# Patient Record
Sex: MALE | Race: WHITE | Employment: FULL TIME | URBAN - METROPOLITAN AREA
[De-identification: names, ages, dates, MRNs, and addresses within clinical notes are randomized per-mention and may not be internally consistent; named-entity substitution may affect disease eponyms.]

---

## 2017-01-26 ENCOUNTER — TELEPHONE (OUTPATIENT)
Dept: FAMILY MEDICINE CLINIC | Age: 47
End: 2017-01-26

## 2017-01-26 NOTE — TELEPHONE ENCOUNTER
Called back and spoke with pt. Pt stated that he had fluid pulled off his knee and it was positive for lyme disease. Came here to get his blood work done to found out if really lyme disease and the code that was used was denied by [facility] and needs it to be changed . Pt received a bill for $500. Would like dx code changed from stating experimental draw so test can be resubmitted and covered. Would like a follow up call when done. Told pt would notify  of need for code change .

## 2017-01-26 NOTE — TELEPHONE ENCOUNTER
----- Message from [place] sent at 1/26/2017 10:48 AM EST -----  Regarding: Dr. Tato Rogers telephone  Contact: [phone]  Pt is requesting a call back regarding diagnosis code for lab work at [facility] for blood test. Pt's best contact number is [phone].

## 2017-02-08 RX ORDER — ALPRAZOLAM 1 MG/1
TABLET ORAL
Qty: 30 TAB | Refills: 2 | Status: SHIPPED | OUTPATIENT
Start: 2017-02-08 | End: 2017-02-09 | Stop reason: SDUPTHER

## 2017-02-09 ENCOUNTER — TELEPHONE (OUTPATIENT)
Dept: FAMILY MEDICINE CLINIC | Age: 47
End: 2017-02-09

## 2017-02-10 ENCOUNTER — TELEPHONE (OUTPATIENT)
Dept: FAMILY MEDICINE CLINIC | Age: 47
End: 2017-02-10

## 2017-02-10 RX ORDER — ALPRAZOLAM 1 MG/1
TABLET ORAL
Qty: 30 TAB | Refills: 2 | Status: SHIPPED | OUTPATIENT
Start: 2017-02-10 | End: 2017-07-10 | Stop reason: SDUPTHER

## 2017-04-14 RX ORDER — LOSARTAN POTASSIUM 100 MG/1
TABLET ORAL
Qty: 90 TAB | Refills: 12 | Status: SHIPPED | OUTPATIENT
Start: 2017-04-14 | End: 2018-07-10 | Stop reason: SDUPTHER

## 2017-04-20 ENCOUNTER — TELEPHONE (OUTPATIENT)
Dept: FAMILY MEDICINE CLINIC | Age: 47
End: 2017-04-20

## 2017-04-20 NOTE — TELEPHONE ENCOUNTER
----- Message from Professor Corine Saucedo sent at 4/20/2017  3:55 PM EDT -----  Regarding: Dr. Keri Hebert  Pt stated that if there is any more information needed for the appeal form that he dropped off to the Dr on 04/19/17, to please call him at [phone].

## 2017-04-21 ENCOUNTER — TELEPHONE (OUTPATIENT)
Dept: FAMILY MEDICINE CLINIC | Age: 47
End: 2017-04-21

## 2017-04-21 NOTE — TELEPHONE ENCOUNTER
Called pt back. No answer.  We received forms and will see about trying to find new dx codes for bill to be approved

## 2017-04-21 NOTE — TELEPHONE ENCOUNTER
Called pt back. Told pt we have forms and if we need anything else we will contact him.  Pt verbalized understanding

## 2017-04-21 NOTE — TELEPHONE ENCOUNTER
----- Message from Tory Party sent at 4/21/2017  9:30 AM EDT -----  Regarding: Dr. Anthony Terry states he brought an appeal from [employer] for Dr. Joanna Mcdermott, he missed a call and thought it might be regarding that. Best contact number is [phone].

## 2017-05-02 ENCOUNTER — DOCUMENTATION ONLY (OUTPATIENT)
Dept: FAMILY MEDICINE CLINIC | Age: 47
End: 2017-05-02

## 2017-05-02 NOTE — PROGRESS NOTES
Informed pt that writer spoke with [employer] to make sure we have all that is needed for the appeal of bill. Representative stated that along with the one authorization form that we have , we also need a appeal form to fill out. Pt notified to obtain form brom insurance and bring to us when he recieves it .  Pt verbalized understanding and will bring form once he gets it

## 2017-07-10 RX ORDER — ALPRAZOLAM 1 MG/1
TABLET ORAL
Qty: 30 TAB | Refills: 2 | Status: SHIPPED | OUTPATIENT
Start: 2017-07-10 | End: 2017-10-24 | Stop reason: SDUPTHER

## 2017-07-11 ENCOUNTER — TELEPHONE (OUTPATIENT)
Dept: FAMILY MEDICINE CLINIC | Age: 47
End: 2017-07-11

## 2017-07-29 RX ORDER — INDOMETHACIN 50 MG/1
CAPSULE ORAL
Qty: 30 CAP | Refills: 0 | Status: SHIPPED | OUTPATIENT
Start: 2017-07-29 | End: 2018-05-28 | Stop reason: SDUPTHER

## 2017-08-14 RX ORDER — FLUOXETINE HYDROCHLORIDE 20 MG/1
CAPSULE ORAL
Qty: 30 CAP | Refills: 5 | Status: SHIPPED | OUTPATIENT
Start: 2017-08-14 | End: 2018-04-15 | Stop reason: SDUPTHER

## 2017-08-23 ENCOUNTER — OFFICE VISIT (OUTPATIENT)
Dept: FAMILY MEDICINE CLINIC | Age: 47
End: 2017-08-23

## 2017-08-23 VITALS
DIASTOLIC BLOOD PRESSURE: 91 MMHG | BODY MASS INDEX: 29.42 KG/M2 | OXYGEN SATURATION: 99 % | RESPIRATION RATE: 14 BRPM | SYSTOLIC BLOOD PRESSURE: 136 MMHG | HEART RATE: 87 BPM | HEIGHT: 73 IN | WEIGHT: 222 LBS | TEMPERATURE: 96.2 F

## 2017-08-23 DIAGNOSIS — M25.561 RECURRENT PAIN OF RIGHT KNEE: Primary | ICD-10-CM

## 2017-08-23 RX ORDER — PREDNISONE 20 MG/1
TABLET ORAL
Qty: 15 TAB | Refills: 0 | Status: SHIPPED | OUTPATIENT
Start: 2017-08-23 | End: 2018-04-18 | Stop reason: ALTCHOICE

## 2017-08-23 NOTE — MR AVS SNAPSHOT
Visit Information Date & Time Provider Department Dept. Phone Encounter #  
 8/23/2017 11:15 AM Melody Bee MD [facility] [phone] [id number] Upcoming Health Maintenance Date Due INFLUENZA AGE 9 TO ADULT 8/1/2017 COLONOSCOPY 7/30/2023 DTaP/Tdap/Td series (2 - Td) 5/19/2026 Allergies as of 8/23/2017  Review Complete On: 8/23/2017 By: Melody Bee MD  
  
 Severity Noted Reaction Type Reactions Dilaudid [Hydromorphone]  11/01/2013   Side Effect Hives Hydrochlorothiazide  10/21/2011   Side Effect Other (comments) Hyperuricemia/gout Lisinopril  02/08/2013   Side Effect Diarrhea Metformin  03/28/2016    Itching Current Immunizations  Reviewed on 11/1/2013 Name Date Influenza Vaccine (Quad) PF 11/30/2016, 2/1/2016 Influenza Vaccine PF 11/1/2013 Tdap 5/19/2016 Not reviewed this visit You Were Diagnosed With   
  
 Codes Comments Recurrent pain of right knee    -  Primary ICD-10-CM: M25.561 ICD-9-CM: 719.46 Vitals BP Pulse Temp Resp Height(growth percentile) Weight(growth percentile) (!) 136/91 87 96.2 °F (35.7 °C) (Oral) 14 6' 1\" (1.854 m) 222 lb (100.7 kg) SpO2 BMI Smoking Status 99% 29.29 kg/m2 Never Smoker BMI and BSA Data Body Mass Index Body Surface Area  
 29.29 kg/m 2 2.28 m 2 Preferred Pharmacy Pharmacy Name Phone [id number] [address] [phone] Your Updated Medication List  
  
   
This list is accurate as of: 8/23/17 11:33 AM.  Always use your most recent med list.  
  
  
  
  
 allopurinol 300 mg tablet Commonly known as:  Booth Wesley Take 1 Tab by mouth daily. To prevent gout ALPRAZolam 1 mg tablet Commonly known as:  XANAX  
take 1 tablet by mouth once daily if needed for anxiety  
  
 cyanocobalamin 1,000 mcg tablet Commonly known as:  VITAMIN B-12 Take 1 Tab by mouth daily. FLUoxetine 20 mg capsule Commonly known as:  PROzac  
take 1 capsule by mouth once daily  
  
 indomethacin 50 mg capsule Commonly known as:  INDOCIN  
take 1 capsule by mouth three times a day with food if needed  
  
 losartan 100 mg tablet Commonly known as:  COZAAR  
take 1 tablet by mouth once daily for blood pressure  
  
 metFORMIN 500 mg tablet Commonly known as:  GLUCOPHAGE Take 1 Tab by mouth two (2) times daily (with meals). For diabetes  
  
 predniSONE 20 mg tablet Commonly known as:  DELTASONE  
1 tab po bid for 5 days, then 1 tab po daily Prescriptions Sent to Pharmacy Refills  
 predniSONE (DELTASONE) 20 mg tablet 0 Si tab po bid for 5 days, then 1 tab po daily Class: Normal  
 Pharmacy: [id number] Matty Velazquez, [address] [place] [address] E  #: [phone] To-Do List   
 2017 Imaging:  XR KNEE RT MAX 2 VWS Introducing [facility]! Babar Salas introduces Care Team Connect patient portal. Now you can access parts of your medical record, email your doctor's office, and request medication refills online. 1. In your internet browser, go to [email or web address] 2. Click on the First Time User? Click Here link in the Sign In box. You will see the New Member Sign Up page. 3. Enter your Care [employer] Connect Access Code exactly as it appears below. You will not need to use this code after youve completed the sign-up process. If you do not sign up before the expiration date, you must request a new code. · [employer] Access Code: [email or web address] Expires: 2017 11:33 AM 
 
4. Enter the last four digits of your Social Security Number (xxxx) and Date of Birth (mm/dd/yyyy) as indicated and click Submit. You will be taken to the next sign-up page. 5. Create a Care Team Connect ID. This will be your Care Team Connect login ID and cannot be changed, so think of one that is secure and easy to remember. 6. Create a "[employer]" password. You can change your password at any time. 7. Enter your Password Reset Question and Answer. This can be used at a later time if you forget your password. 8. Enter your e-mail address. You will receive e-mail notification when new information is available in [address] 9. Click Sign Up. You can now view and download portions of your medical record. 10. Click the Download Summary menu link to download a portable copy of your medical information. If you have questions, please visit the Frequently Asked Questions section of the "[employer]" website. Remember, "[employer]" is NOT to be used for urgent needs. For medical emergencies, dial 911. Now available from your iPhone and Android! Please provide this summary of care documentation to your next provider. Your primary care clinician is listed as Tea Del Rosario. If you have any questions after today's visit, please call [phone].

## 2017-08-23 NOTE — PROGRESS NOTES
HISTORY OF PRESENT ILLNESS  Karyn Glynn is a 40 y.o. male. HPI 2 day hx r knee pain and swelling. Tried to go to work today- had to leave. Took an Indomethacin last night- no relief. NO hx trauma. Has had problems with this R knee and R ankle- intermittently for 2 years. ROS    Physical Exam   Constitutional: He appears well-developed and well-nourished. HENT:   Right Ear: External ear normal.   Left Ear: External ear normal.   Mouth/Throat: Oropharynx is clear and moist.   Neck: No thyromegaly present. Cardiovascular: Normal rate, regular rhythm, normal heart sounds and intact distal pulses. Pulmonary/Chest: Effort normal and breath sounds normal. No respiratory distress. He has no wheezes. Abdominal: Soft. Bowel sounds are normal. He exhibits no distension and no mass. There is no tenderness. There is no guarding. Musculoskeletal: Normal range of motion. He exhibits no edema. R knee- no effusion. Tenderness lateral knee joint. Decreased rom of knee   Lymphadenopathy:     He has no cervical adenopathy. Nursing note and vitals reviewed. ASSESSMENT and PLAN  Orders Placed This Encounter    XR KNEE RT MAX 2 VWS    predniSONE (DELTASONE) 20 mg tablet     Diagnoses and all orders for this visit:    1.  Recurrent pain of right knee  -     XR KNEE RT MAX 2 VWS; Future    Other orders  -     predniSONE (DELTASONE) 20 mg tablet; 1 tab po bid for 5 days, then 1 tab po daily      Follow-up Disposition: Not on File

## 2017-08-23 NOTE — PROGRESS NOTES
Chief Complaint   Patient presents with    Knee Pain     x 2 days right knee pain     1. Have you been to the ER, urgent care clinic since your last visit? Hospitalized since your last visit? No    2. Have you seen or consulted any other health care providers outside of the [address] [place] [address] since your last visit? Include any pap smears or colon screening.  No     Health Maintenance Due   Topic Date Due    INFLUENZA AGE 9 TO ADULT  08/01/2017

## 2017-10-24 RX ORDER — ALPRAZOLAM 1 MG/1
TABLET ORAL
Qty: 30 TAB | Refills: 2 | Status: SHIPPED | OUTPATIENT
Start: 2017-10-24 | End: 2018-01-25 | Stop reason: SDUPTHER

## 2017-10-25 ENCOUNTER — TELEPHONE (OUTPATIENT)
Dept: FAMILY MEDICINE CLINIC | Age: 47
End: 2017-10-25

## 2017-10-25 NOTE — TELEPHONE ENCOUNTER
Per Dr. Carvajal Tint[place] prescription for ALPRAZolam (Xanax) 1 mg tablet #30 with two additional refills called to [id number].

## 2017-11-06 ENCOUNTER — TELEPHONE (OUTPATIENT)
Dept: FAMILY MEDICINE CLINIC | Age: 47
End: 2017-11-06

## 2017-11-06 NOTE — TELEPHONE ENCOUNTER
----- Message from Spenser Morales sent at 11/6/2017 11:27 AM EST -----  Regarding: /Telephone  Pt requesting a call back. Best contact for the pt is [phone].

## 2017-11-07 NOTE — TELEPHONE ENCOUNTER
Called pt back. Pt was calling to state that he received a bill from [employer] that he states should have been covered. Pt stated that the wrong dx code was on the bill which caused it not to be paid by insurance. Would like correct code applied. Pt stated labs were done due to finding out he had lyme disease and the code was sent as stating they were drawn for experimental reasons. Date of service was 11/23/16. Would like us to reach out to [facility] and let him know what we can find out. Told pt would follow back up with him.  Pt verbalized understanding

## 2017-11-15 ENCOUNTER — DOCUMENTATION ONLY (OUTPATIENT)
Dept: FAMILY MEDICINE CLINIC | Age: 47
End: 2017-11-15

## 2017-11-15 NOTE — PROGRESS NOTES
Called pt back to notify him that code was corrected and for insurance to resubmitted bill.  Pt left vm to return call

## 2018-01-26 ENCOUNTER — DOCUMENTATION ONLY (OUTPATIENT)
Dept: FAMILY MEDICINE CLINIC | Age: 48
End: 2018-01-26

## 2018-02-05 RX ORDER — METFORMIN HYDROCHLORIDE 500 MG/1
TABLET ORAL
Qty: 60 TAB | Refills: 12 | Status: SHIPPED | OUTPATIENT
Start: 2018-02-05 | End: 2019-07-11 | Stop reason: ALTCHOICE

## 2018-04-16 RX ORDER — FLUOXETINE HYDROCHLORIDE 20 MG/1
CAPSULE ORAL
Qty: 30 CAP | Refills: 5 | Status: SHIPPED | OUTPATIENT
Start: 2018-04-16 | End: 2019-07-11 | Stop reason: ALTCHOICE

## 2018-04-18 ENCOUNTER — OFFICE VISIT (OUTPATIENT)
Dept: FAMILY MEDICINE CLINIC | Age: 48
End: 2018-04-18

## 2018-04-18 VITALS
OXYGEN SATURATION: 98 % | HEIGHT: 73 IN | SYSTOLIC BLOOD PRESSURE: 165 MMHG | TEMPERATURE: 97.3 F | RESPIRATION RATE: 14 BRPM | BODY MASS INDEX: 30.46 KG/M2 | HEART RATE: 72 BPM | DIASTOLIC BLOOD PRESSURE: 106 MMHG | WEIGHT: 229.8 LBS

## 2018-04-18 DIAGNOSIS — R06.09 DYSPNEA ON EXERTION: ICD-10-CM

## 2018-04-18 DIAGNOSIS — Z00.00 ANNUAL PHYSICAL EXAM: Primary | ICD-10-CM

## 2018-04-18 DIAGNOSIS — N20.0 KIDNEY STONE: ICD-10-CM

## 2018-04-18 DIAGNOSIS — I10 ESSENTIAL HYPERTENSION: ICD-10-CM

## 2018-04-18 DIAGNOSIS — Z23 ENCOUNTER FOR IMMUNIZATION: ICD-10-CM

## 2018-04-18 LAB — HBA1C MFR BLD HPLC: 6.4 %

## 2018-04-18 RX ORDER — AMLODIPINE BESYLATE 5 MG/1
5 TABLET ORAL DAILY
Qty: 90 TAB | Refills: 3 | Status: SHIPPED | OUTPATIENT
Start: 2018-04-18 | End: 2019-07-10 | Stop reason: SDUPTHER

## 2018-04-18 RX ORDER — TAMSULOSIN HYDROCHLORIDE 0.4 MG/1
CAPSULE ORAL
Refills: 0 | COMMUNITY
Start: 2018-04-16 | End: 2019-07-11 | Stop reason: ALTCHOICE

## 2018-04-18 NOTE — PROGRESS NOTES
Chief Complaint   Patient presents with    Complete Physical    ED Follow-up     Kidney stone passed 2 days ago   Emergency in [facility]  rt 301        1. Have you been to the ER, urgent care clinic since your last visit? Hospitalized since your last visit? No    2. Have you seen or consulted any other health care providers outside of the [facility] since your last visit? Include any pap smears or colon screening.  No     Health Maintenance Due   Topic Date Due    Influenza Age 5 to Adult  08/01/2017

## 2018-04-18 NOTE — MR AVS SNAPSHOT
[facility] 
 
 
 [address] [facility] [address] [phone] Patient: Ann Sharma MRN: [id number] JOB:[birth year] Visit Information Date & Time Provider Department Dept. Phone Encounter #  
 4/18/2018  9:15 AM Maria Ricketts MD [facility] [phone] [id number] Upcoming Health Maintenance Date Due Influenza Age 5 to Adult 8/1/2017 COLONOSCOPY 7/30/2023 DTaP/Tdap/Td series (2 - Td) 5/19/2026 Allergies as of 4/18/2018  Review Complete On: 4/18/2018 By: Maria Ricketts MD  
  
 Severity Noted Reaction Type Reactions Dilaudid [Hydromorphone]  11/01/2013   Side Effect Hives Hydrochlorothiazide  10/21/2011   Side Effect Other (comments) Hyperuricemia/gout Lisinopril  02/08/2013   Side Effect Diarrhea Metformin  03/28/2016    Itching Current Immunizations  Reviewed on 11/1/2013 Name Date Influenza Vaccine (Quad) PF 4/18/2018, 11/30/2016, 2/1/2016 Influenza Vaccine PF 11/1/2013 Tdap 5/19/2016 Not reviewed this visit You Were Diagnosed With   
  
 Codes Comments Annual physical exam    -  Primary ICD-10-CM: Z00.00 ICD-9-CM: V70.0 Encounter for immunization     ICD-10-CM: N61 ICD-9-CM: V03.89 Essential hypertension     ICD-10-CM: I10 
ICD-9-CM: 401.9 Dyspnea on exertion     ICD-10-CM: R06.09 
ICD-9-CM: 786.09 Kidney stone     ICD-10-CM: N20.0 ICD-9-CM: 592.0 Vitals BP Pulse Temp Resp Height(growth percentile) Weight(growth percentile) (!) 165/106 72 97.3 °F (36.3 °C) (Oral) 14 6' 1\" (1.854 m) 229 lb 12.8 oz (104.2 kg) SpO2 BMI Smoking Status 98% 30.32 kg/m2 Never Smoker Vitals History BMI and BSA Data Body Mass Index Body Surface Area  
 30.32 kg/m 2 2.32 m 2 Preferred Pharmacy [facility] Name Phone [id number] Diana Gallegos [address] [place] [address] [phone] Your Updated Medication List  
  
   
This list is accurate as of 4/18/18 10:16 AM.  Always use your most recent med list.  
  
  
  
  
 allopurinol 300 mg tablet Commonly known as:  Jarad Snowman Take 1 Tab by mouth daily. To prevent gout ALPRAZolam 1 mg tablet Commonly known as:  XANAX  
take 1 tablet by mouth once daily if needed for anxiety  
  
 amLODIPine 5 mg tablet Commonly known as:  Ghazala Grebe Take 1 Tab by mouth daily. For blood pressure  
  
 cyanocobalamin 1,000 mcg tablet Commonly known as:  VITAMIN B-12 Take 1 Tab by mouth daily. FLUoxetine 20 mg capsule Commonly known as:  PROzac  
take 1 capsule by mouth once daily  
  
 indomethacin 50 mg capsule Commonly known as:  INDOCIN  
take 1 capsule by mouth three times a day with food if needed  
  
 losartan 100 mg tablet Commonly known as:  COZAAR  
take 1 tablet by mouth once daily for blood pressure  
  
 metFORMIN 500 mg tablet Commonly known as:  GLUCOPHAGE  
take 1 tablet by mouth twice a day with meals for diabetes  
  
 tamsulosin 0.4 mg capsule Commonly known as:  FLOMAX  
take 1 capsule by mouth once daily Prescriptions Sent to Pharmacy Refills  
 amLODIPine (NORVASC) 5 mg tablet 3 Sig: Take 1 Tab by mouth daily. For blood pressure Class: Normal  
 Pharmacy: [id number] Erik Meigs, [address] [place] [address] #: [phone] Route: Oral  
  
We Performed the Following AMB POC COMPLETE CBC, AUTOMATED [71670 CPT(R)] AMB POC EKG ROUTINE W/ 12 LEADS, INTER & REP [86746 CPT(R)] AMB POC HEMOGLOBIN A1C [96706 CPT(R)] INFLUENZA VIRUS VAC QUAD,SPLIT,PRESV FREE SYRINGE IM [id number] CPT(R)] LIPID PANEL [56113 CPT(R)] METABOLIC PANEL, COMPREHENSIVE [47575 CPT(R)] MICROALBUMIN, UR, RAND W/ MICROALB/CREAT RATIO [id number] CPT(R)] MA IMMUNIZ ADMIN,1 SINGLE/COMB VAC/TOXOID [id number] CPT(R)] STONE ANALYSIS, URINARY [26018 CPT(R)] To-Do List   
 04/18/2018 Imaging:  XR CHEST PA LAT Introducing [facility]! Leticia Aw introduces [employer] patient portal. Now you can access parts of your medical record, email your doctor's office, and request medication refills online. 1. In your internet browser, go to [email or web address] 2. Click on the First Time User? Click Here link in the Sign In box. You will see the New Member Sign Up page. 3. Enter your [employer] Access Code exactly as it appears below. You will not need to use this code after youve completed the sign-up process. If you do not sign up before the expiration date, you must request a new code. · [employer] Access Code: [email or web address] Expires: 7/17/2018 10:16 AM 
 
4. Enter the last four digits of your Social Security Number (xxxx) and Date of Birth (mm/dd/yyyy) as indicated and click Submit. You will be taken to the next sign-up page. 5. Create a [employer] ID. This will be your [employer] login ID and cannot be changed, so think of one that is secure and easy to remember. 6. Create a [employer] password. You can change your password at any time. 7. Enter your Password Reset Question and Answer. This can be used at a later time if you forget your password. 8. Enter your e-mail address. You will receive e-mail notification when new information is available in [address] 9. Click Sign Up. You can now view and download portions of your medical record. 10. Click the Download Summary menu link to download a portable copy of your medical information. If you have questions, please visit the Frequently Asked Questions section of the [employer] website. Remember, [employer] is NOT to be used for urgent needs. For medical emergencies, dial 911. Now available from your iPhone and Android! Please provide this summary of care documentation to your next provider. Your primary care clinician is listed as Cherry Helms.  If you have any questions after today's visit, please call [phone].

## 2018-04-18 NOTE — PROGRESS NOTES
HISTORY OF PRESENT ILLNESS  Rachel Chinchilla is a 56 y.o. male. HPIin for physical. Passed a kidney stone earlier this week. Was seen at [facility], had a CT scan showing a 5 mm stone. Father had an MI,  from cancer. Several uncles have  from heart attacks. Review of Systems   Constitutional: Negative for malaise/fatigue and weight loss. HENT: Negative for hearing loss and tinnitus. Eyes: Negative for blurred vision and double vision. Respiratory: Negative for cough and wheezing. Nonsmoker   Cardiovascular: Negative for chest pain and orthopnea. Gastrointestinal: Negative for abdominal pain and blood in stool. Genitourinary: Positive for frequency and urgency. Recent kidney stone. Skin: Negative for itching and rash. Neurological: Negative for focal weakness and loss of consciousness. Psychiatric/Behavioral: Negative for depression. The patient has insomnia. Physical Exam   Constitutional: He appears well-developed and well-nourished. HENT:   Right Ear: External ear normal.   Left Ear: External ear normal.   Mouth/Throat: Oropharynx is clear and moist.   Neck: No thyromegaly present. Cardiovascular: Normal rate, regular rhythm, normal heart sounds and intact distal pulses. Pulmonary/Chest: Effort normal and breath sounds normal. No respiratory distress. He has no wheezes. Abdominal: Soft. Bowel sounds are normal. He exhibits no distension and no mass. There is no tenderness. There is no guarding. Musculoskeletal: Normal range of motion. He exhibits no edema. Lymphadenopathy:     He has no cervical adenopathy. Nursing note and vitals reviewed. ASSESSMENT and PLAN  Orders Placed This Encounter    XR CHEST PA LAT     Standing Status:   Future     Standing Expiration Date:   2018     Order Specific Question:   Reason for Exam     Answer:   dyspnea     Order Specific Question:   Is Patient Allergic to Contrast Dye?      Answer:   No    Influenza virus vaccine (QUADRIVALENT PRES FREE SYRINGE) IM ([id number])    METABOLIC PANEL, COMPREHENSIVE    LIPID PANEL    MICROALBUMIN, UR, RAND W/ MICROALB/CREAT RATIO    STONE ANALYSIS, URINARY    AMB POC COMPLETE CBC, AUTOMATED    AMB POC HEMOGLOBIN A1C    AMB POC EKG ROUTINE W/ 12 LEADS, INTER & REP     Order Specific Question:   Reason for Exam:     Answer:   dyspnea    AK IMMUNIZ ADMIN,1 SINGLE/COMB VAC/TOXOID    amLODIPine (NORVASC) 5 mg tablet     Sig: Take 1 Tab by mouth daily. For blood pressure     Dispense:  90 Tab     Refill:  3     Orders Placed This Encounter    XR CHEST PA LAT    Influenza virus vaccine (QUADRIVALENT PRES FREE SYRINGE) IM (27041)    METABOLIC PANEL, COMPREHENSIVE    LIPID PANEL    MICROALBUMIN, UR, RAND W/ MICROALB/CREAT RATIO    STONE ANALYSIS, URINARY    AMB POC COMPLETE CBC, AUTOMATED    AMB POC HEMOGLOBIN A1C    AMB POC EKG ROUTINE W/ 12 LEADS, INTER & REP    AK IMMUNIZ ADMIN,1 SINGLE/COMB VAC/TOXOID    amLODIPine (NORVASC) 5 mg tablet     Diagnoses and all orders for this visit:    1. Annual physical exam  -     AMB POC COMPLETE CBC, AUTOMATED  -     METABOLIC PANEL, COMPREHENSIVE  -     LIPID PANEL  -     AMB POC HEMOGLOBIN A1C  -     MICROALBUMIN, UR, RAND W/ MICROALB/CREAT RATIO    2. Encounter for immunization  -     Influenza virus vaccine (QUADRIVALENT PRES FREE SYRINGE) IM ([id number])  -     AK IMMUNIZ ADMIN,1 SINGLE/COMB VAC/TOXOID    3. Essential hypertension    4. Dyspnea on exertion  -     XR CHEST PA LAT; Future  -     AMB POC EKG ROUTINE W/ 12 LEADS, INTER & REP    5. Kidney stone  -     STONE ANALYSIS, URINARY    Other orders  -     amLODIPine (NORVASC) 5 mg tablet; Take 1 Tab by mouth daily.  For blood pressure

## 2018-04-19 LAB
ALBUMIN SERPL-MCNC: 4.3 G/DL (ref 3.5–5.5)
ALBUMIN/CREAT UR: 24.9 MG/G CREAT (ref 0–30)
ALBUMIN/GLOB SERPL: 1.9 {RATIO} (ref 1.2–2.2)
ALP SERPL-CCNC: 50 IU/L (ref 39–117)
ALT SERPL-CCNC: 9 IU/L (ref 0–44)
AST SERPL-CCNC: 16 IU/L (ref 0–40)
BILIRUB SERPL-MCNC: 0.4 MG/DL (ref 0–1.2)
BUN SERPL-MCNC: 21 MG/DL (ref 6–24)
BUN/CREAT SERPL: 15 (ref 9–20)
CALCIUM SERPL-MCNC: 9.2 MG/DL (ref 8.7–10.2)
CHLORIDE SERPL-SCNC: 105 MMOL/L (ref 96–106)
CHOLEST SERPL-MCNC: 241 MG/DL (ref 100–199)
CO2 SERPL-SCNC: 24 MMOL/L (ref 18–29)
CREAT SERPL-MCNC: 1.42 MG/DL (ref 0.76–1.27)
CREAT UR-MCNC: 88.2 MG/DL
GFR SERPLBLD CREATININE-BSD FMLA CKD-EPI: 59 ML/MIN/1.73
GFR SERPLBLD CREATININE-BSD FMLA CKD-EPI: 68 ML/MIN/1.73
GLOBULIN SER CALC-MCNC: 2.3 G/DL (ref 1.5–4.5)
GLUCOSE SERPL-MCNC: 150 MG/DL (ref 65–99)
HDLC SERPL-MCNC: 62 MG/DL
INTERPRETATION, 910389: NORMAL
INTERPRETATION: NORMAL
LDLC SERPL CALC-MCNC: 122 MG/DL (ref 0–99)
MICROALBUMIN UR-MCNC: 22 UG/ML
PDF IMAGE, 910387: NORMAL
POTASSIUM SERPL-SCNC: 4 MMOL/L (ref 3.5–5.2)
PROT SERPL-MCNC: 6.6 G/DL (ref 6–8.5)
SODIUM SERPL-SCNC: 143 MMOL/L (ref 134–144)
TRIGL SERPL-MCNC: 283 MG/DL (ref 0–149)
VLDLC SERPL CALC-MCNC: 57 MG/DL (ref 5–40)

## 2018-04-24 ENCOUNTER — TELEPHONE (OUTPATIENT)
Dept: FAMILY MEDICINE CLINIC | Age: 48
End: 2018-04-24

## 2018-04-24 RX ORDER — ASPIRIN 81 MG/1
81 TABLET ORAL DAILY
Qty: 30 TAB | Refills: 12
Start: 2018-04-24 | End: 2019-07-11 | Stop reason: ALTCHOICE

## 2018-04-24 RX ORDER — ATORVASTATIN CALCIUM 20 MG/1
20 TABLET, FILM COATED ORAL DAILY
Qty: 30 TAB | Refills: 12 | Status: SHIPPED | OUTPATIENT
Start: 2018-04-24 | End: 2019-05-20 | Stop reason: SDUPTHER

## 2018-04-27 LAB
COLOR STONE: NORMAL
COMMENT, 519994: NORMAL
COMPOSITION, 120440: NORMAL
DISCLAIMER, STO32L: NORMAL
NIDUS STONE QL: NORMAL
PLEASE NOTE:, 130421: NORMAL
SIZE STONE: NORMAL MM
URATE MFR STONE: 100 %
WT STONE: 47.3 MG

## 2018-04-30 NOTE — PROGRESS NOTES
pc with pt. He is reluctant to take further meds such as allopurinol or potassium bicarbonate for uric acid stones. To drinks lots of water.

## 2018-05-28 DIAGNOSIS — F41.9 ANXIETY: ICD-10-CM

## 2018-05-29 RX ORDER — INDOMETHACIN 50 MG/1
CAPSULE ORAL
Qty: 30 CAP | Refills: 0 | Status: SHIPPED | OUTPATIENT
Start: 2018-05-29 | End: 2019-01-09 | Stop reason: SDUPTHER

## 2018-05-29 RX ORDER — ALPRAZOLAM 1 MG/1
TABLET ORAL
Qty: 30 TAB | Refills: 2 | Status: SHIPPED | OUTPATIENT
Start: 2018-05-29 | End: 2018-08-27 | Stop reason: SDUPTHER

## 2018-05-30 ENCOUNTER — TELEPHONE (OUTPATIENT)
Dept: FAMILY MEDICINE CLINIC | Age: 48
End: 2018-05-30

## 2018-07-10 RX ORDER — LOSARTAN POTASSIUM 100 MG/1
TABLET ORAL
Qty: 90 TAB | Refills: 12 | Status: SHIPPED | OUTPATIENT
Start: 2018-07-10 | End: 2019-07-10 | Stop reason: SDUPTHER

## 2018-08-27 DIAGNOSIS — F41.9 ANXIETY: ICD-10-CM

## 2018-08-27 RX ORDER — ALPRAZOLAM 1 MG/1
TABLET ORAL
Qty: 30 TAB | Refills: 2 | Status: SHIPPED | OUTPATIENT
Start: 2018-08-27 | End: 2018-09-25 | Stop reason: SDUPTHER

## 2018-08-28 ENCOUNTER — DOCUMENTATION ONLY (OUTPATIENT)
Dept: FAMILY MEDICINE CLINIC | Age: 48
End: 2018-08-28

## 2018-08-30 ENCOUNTER — TELEPHONE (OUTPATIENT)
Dept: FAMILY MEDICINE CLINIC | Age: 48
End: 2018-08-30

## 2018-09-25 DIAGNOSIS — F41.9 ANXIETY: ICD-10-CM

## 2018-09-25 RX ORDER — ALPRAZOLAM 1 MG/1
1 TABLET ORAL
Qty: 60 TAB | Refills: 2 | Status: SHIPPED | OUTPATIENT
Start: 2018-09-25 | End: 2019-01-06 | Stop reason: SDUPTHER

## 2019-01-06 DIAGNOSIS — F41.9 ANXIETY: ICD-10-CM

## 2019-01-07 RX ORDER — ALPRAZOLAM 1 MG/1
TABLET ORAL
Qty: 60 TAB | Refills: 2 | Status: SHIPPED | OUTPATIENT
Start: 2019-01-07 | End: 2019-04-15 | Stop reason: SDUPTHER

## 2019-01-09 ENCOUNTER — DOCUMENTATION ONLY (OUTPATIENT)
Dept: FAMILY MEDICINE CLINIC | Age: 49
End: 2019-01-09

## 2019-01-09 RX ORDER — INDOMETHACIN 50 MG/1
CAPSULE ORAL
Qty: 30 CAP | Refills: 0 | Status: SHIPPED | OUTPATIENT
Start: 2019-01-09 | End: 2019-11-04 | Stop reason: SDUPTHER

## 2019-01-09 RX ORDER — PREDNISONE 20 MG/1
20 TABLET ORAL
Qty: 15 TAB | Refills: 0 | Status: SHIPPED | OUTPATIENT
Start: 2019-01-09 | End: 2020-01-03 | Stop reason: ALTCHOICE

## 2019-04-15 DIAGNOSIS — F41.9 ANXIETY: ICD-10-CM

## 2019-04-15 RX ORDER — ALPRAZOLAM 1 MG/1
TABLET ORAL
Qty: 60 TAB | Refills: 2 | Status: SHIPPED | OUTPATIENT
Start: 2019-04-15 | End: 2019-07-11 | Stop reason: SDUPTHER

## 2019-04-17 ENCOUNTER — DOCUMENTATION ONLY (OUTPATIENT)
Dept: FAMILY MEDICINE CLINIC | Age: 49
End: 2019-04-17

## 2019-05-20 RX ORDER — ATORVASTATIN CALCIUM 20 MG/1
TABLET, FILM COATED ORAL
Qty: 30 TAB | Refills: 12 | Status: SHIPPED | OUTPATIENT
Start: 2019-05-20 | End: 2020-08-12 | Stop reason: ALTCHOICE

## 2019-07-11 ENCOUNTER — OFFICE VISIT (OUTPATIENT)
Dept: FAMILY MEDICINE CLINIC | Age: 49
End: 2019-07-11

## 2019-07-11 VITALS
SYSTOLIC BLOOD PRESSURE: 137 MMHG | BODY MASS INDEX: 32.39 KG/M2 | DIASTOLIC BLOOD PRESSURE: 86 MMHG | HEART RATE: 88 BPM | RESPIRATION RATE: 14 BRPM | HEIGHT: 73 IN | OXYGEN SATURATION: 97 % | WEIGHT: 244.4 LBS | TEMPERATURE: 96.1 F

## 2019-07-11 DIAGNOSIS — Z00.00 ANNUAL PHYSICAL EXAM: Primary | ICD-10-CM

## 2019-07-11 DIAGNOSIS — F41.9 ANXIETY: ICD-10-CM

## 2019-07-11 DIAGNOSIS — E78.00 HYPERCHOLESTEROLEMIA: ICD-10-CM

## 2019-07-11 DIAGNOSIS — D64.9 ANEMIA, UNSPECIFIED TYPE: ICD-10-CM

## 2019-07-11 DIAGNOSIS — E11.9 CONTROLLED TYPE 2 DIABETES MELLITUS WITHOUT COMPLICATION, WITHOUT LONG-TERM CURRENT USE OF INSULIN (HCC): ICD-10-CM

## 2019-07-11 LAB
GRAN# POC: NORMAL K/UL
GRAN% POC: NORMAL %
HBA1C MFR BLD HPLC: 7 %
HCT VFR BLD CALC: NORMAL %
HGB BLD-MCNC: NORMAL G/DL
LY# POC: NORMAL K/UL
LY% POC: NORMAL %
MCH RBC QN: NORMAL PG
MCHC RBC-ENTMCNC: NORMAL G/DL
MCV RBC: NORMAL FL
MID #, POC: NORMAL 10^3/UL
MID% POC: NORMAL %
PLATELET # BLD: NORMAL K/UL
RBC # BLD: NORMAL M/UL
WBC # BLD: NORMAL K/UL

## 2019-07-11 RX ORDER — AMLODIPINE BESYLATE 5 MG/1
TABLET ORAL
Qty: 90 TAB | Refills: 3 | Status: SHIPPED | OUTPATIENT
Start: 2019-07-11 | End: 2021-06-14 | Stop reason: ALTCHOICE

## 2019-07-11 RX ORDER — METFORMIN HYDROCHLORIDE 500 MG/1
500 TABLET ORAL 2 TIMES DAILY WITH MEALS
Qty: 60 TAB | Refills: 12 | Status: SHIPPED | OUTPATIENT
Start: 2019-07-11 | End: 2020-08-17 | Stop reason: SDUPTHER

## 2019-07-11 RX ORDER — ALPRAZOLAM 1 MG/1
TABLET ORAL
Qty: 60 TAB | Refills: 2 | Status: SHIPPED | OUTPATIENT
Start: 2019-07-11 | End: 2019-10-18 | Stop reason: SDUPTHER

## 2019-07-11 RX ORDER — METFORMIN HYDROCHLORIDE 500 MG/1
TABLET ORAL
Qty: 60 TAB | Refills: 12 | Status: CANCELLED | OUTPATIENT
Start: 2019-07-11

## 2019-07-11 RX ORDER — LOSARTAN POTASSIUM 100 MG/1
TABLET ORAL
Qty: 90 TAB | Refills: 12 | Status: SHIPPED | OUTPATIENT
Start: 2019-07-11 | End: 2020-08-18 | Stop reason: ALTCHOICE

## 2019-07-11 NOTE — PROGRESS NOTES
Chief Complaint   Patient presents with    Complete Physical     1. Have you been to the ER, urgent care clinic since your last visit? Hospitalized since your last visit? no    2. Have you seen or consulted any other health care providers outside of the [address] [place] [address] since your last visit? Include any pap smears or colon screening.  No   [facility]    Health Maintenance Due   Topic Date Due    Pneumococcal 0-64 years (1 of 1 - PPSV23) [birth year]13/1977    FOOT EXAM Q1  09/13/1981    EYE EXAM RETINAL OR DILATED  09/13/1981    HEMOGLOBIN A1C Q6M  10/18/2018    MICROALBUMIN Q1  04/18/2019    LIPID PANEL Q1  04/18/2019

## 2019-07-11 NOTE — PROGRESS NOTES
HISTORY OF PRESENT ILLNESS  Duyen Guillory is a 53 y.o. male. HPI In for physical. Has been to [facility] urology, and apparently hgb has been low. Has been very tired. Blood sugar was a little high. Usually sugar is around 100 at times. Some foot pain, but is on concrete all day. Father had both stomach cancer, and bladder cancer. Review of Systems   Constitutional: Positive for malaise/fatigue. Negative for weight loss. HENT: Negative for hearing loss and tinnitus. Respiratory: Positive for shortness of breath (with activity. ). Nonsmoker   Cardiovascular: Negative for chest pain and orthopnea. Gastrointestinal: Negative for abdominal pain, blood in stool and melena. No dysphagia. Some early satiety when eats, but this has been present for a long time. Genitourinary: Negative for frequency and hematuria. Skin: Positive for itching (some itching on back for a week. ). Negative for rash. Neurological: Negative for loss of consciousness and weakness. Endo/Heme/Allergies: Negative for polydipsia. Does not bruise/bleed easily. Psychiatric/Behavioral: Negative for depression. The patient does not have insomnia. Physical Exam   Constitutional: He appears well-developed and well-nourished. HENT:   Right Ear: External ear normal.   Left Ear: External ear normal.   Mouth/Throat: Oropharynx is clear and moist.   Neck: No thyromegaly present. Cardiovascular: Normal rate, regular rhythm, normal heart sounds and intact distal pulses. Pulmonary/Chest: Effort normal and breath sounds normal. No respiratory distress. He has no wheezes. Abdominal: Soft. Bowel sounds are normal. He exhibits no distension and no mass. There is no tenderness. There is no guarding. Musculoskeletal: Normal range of motion. He exhibits no edema. Lymphadenopathy:     He has no cervical adenopathy. Nursing note and vitals reviewed.       ASSESSMENT and PLAN  Orders Placed This Encounter    METABOLIC PANEL, COMPREHENSIVE    LIPID PANEL    MICROALBUMIN, UR, RAND W/ MICROALB/CREAT RATIO    IRON PROFILE    FERRITIN    AMB POC HEMOGLOBIN A1C    AMB POC COMPLETE CBC,AUTOMATED ENTER     DIABETES FOOT EXAM    ALPRAZolam (XANAX) 1 mg tablet    metFORMIN (GLUCOPHAGE) 500 mg tablet     Diagnoses and all orders for this visit:    1. Annual physical exam  -     METABOLIC PANEL, COMPREHENSIVE  -     LIPID PANEL  -     AMB POC HEMOGLOBIN A1C  -     MICROALBUMIN, UR, RAND W/ MICROALB/CREAT RATIO  -     IRON PROFILE  -     FERRITIN  -     AMB POC COMPLETE CBC,AUTOMATED ENTER    2. Anxiety  -     ALPRAZolam (XANAX) 1 mg tablet; take 1 tablet by mouth twice a day if needed for anxiety    3. Anemia, unspecified type    4. Controlled type 2 diabetes mellitus without complication, without long-term current use of insulin ([facility])  -      DIABETES FOOT EXAM    5. Hypercholesterolemia    Other orders  -     metFORMIN (GLUCOPHAGE) 500 mg tablet; Take 1 Tab by mouth two (2) times daily (with meals). For diabetes      Follow-up and Dispositions    · Return in about 6 months (around 1/11/2020).

## 2019-07-12 LAB
ALBUMIN SERPL-MCNC: 4.7 G/DL (ref 3.5–5.5)
ALBUMIN/CREAT UR: 6.5 MG/G CREAT (ref 0–30)
ALBUMIN/GLOB SERPL: 1.7 {RATIO} (ref 1.2–2.2)
ALP SERPL-CCNC: 70 IU/L (ref 39–117)
ALT SERPL-CCNC: 12 IU/L (ref 0–44)
AST SERPL-CCNC: 14 IU/L (ref 0–40)
BILIRUB SERPL-MCNC: 0.4 MG/DL (ref 0–1.2)
BUN SERPL-MCNC: 20 MG/DL (ref 6–24)
BUN/CREAT SERPL: 15 (ref 9–20)
CALCIUM SERPL-MCNC: 9.3 MG/DL (ref 8.7–10.2)
CHLORIDE SERPL-SCNC: 103 MMOL/L (ref 96–106)
CHOLEST SERPL-MCNC: 186 MG/DL (ref 100–199)
CO2 SERPL-SCNC: 22 MMOL/L (ref 20–29)
CREAT SERPL-MCNC: 1.33 MG/DL (ref 0.76–1.27)
CREAT UR-MCNC: 118.8 MG/DL
FERRITIN SERPL-MCNC: 674 NG/ML (ref 30–400)
GLOBULIN SER CALC-MCNC: 2.7 G/DL (ref 1.5–4.5)
GLUCOSE SERPL-MCNC: 157 MG/DL (ref 65–99)
HDLC SERPL-MCNC: 60 MG/DL
INTERPRETATION, 910389: NORMAL
IRON SATN MFR SERPL: 37 % (ref 15–55)
IRON SERPL-MCNC: 111 UG/DL (ref 38–169)
LDLC SERPL CALC-MCNC: 80 MG/DL (ref 0–99)
MICROALBUMIN UR-MCNC: 7.7 UG/ML
POTASSIUM SERPL-SCNC: 4.1 MMOL/L (ref 3.5–5.2)
PROT SERPL-MCNC: 7.4 G/DL (ref 6–8.5)
SODIUM SERPL-SCNC: 145 MMOL/L (ref 134–144)
TIBC SERPL-MCNC: 297 UG/DL (ref 250–450)
TRIGL SERPL-MCNC: 232 MG/DL (ref 0–149)
UIBC SERPL-MCNC: 186 UG/DL (ref 111–343)
VLDLC SERPL CALC-MCNC: 46 MG/DL (ref 5–40)

## 2019-10-18 DIAGNOSIS — F41.9 ANXIETY: ICD-10-CM

## 2019-10-21 DIAGNOSIS — F41.9 ANXIETY: ICD-10-CM

## 2019-10-21 RX ORDER — ALPRAZOLAM 1 MG/1
TABLET ORAL
Qty: 60 TAB | Refills: 2 | Status: SHIPPED | OUTPATIENT
Start: 2019-10-21 | End: 2020-02-17 | Stop reason: SDUPTHER

## 2019-10-21 RX ORDER — ALPRAZOLAM 1 MG/1
TABLET ORAL
Qty: 60 TAB | Refills: 2 | Status: SHIPPED | OUTPATIENT
Start: 2019-10-21 | End: 2020-01-03 | Stop reason: SDUPTHER

## 2019-11-04 RX ORDER — AMLODIPINE BESYLATE 5 MG/1
TABLET ORAL
Qty: 90 TAB | Refills: 3 | Status: SHIPPED | OUTPATIENT
Start: 2019-11-04 | End: 2020-01-03 | Stop reason: SDUPTHER

## 2019-11-04 RX ORDER — INDOMETHACIN 50 MG/1
CAPSULE ORAL
Qty: 30 CAP | Refills: 0 | Status: SHIPPED | OUTPATIENT
Start: 2019-11-04 | End: 2020-01-03 | Stop reason: ALTCHOICE

## 2019-11-21 RX ORDER — LOSARTAN POTASSIUM 100 MG/1
TABLET ORAL
Qty: 90 TAB | Refills: 12 | Status: SHIPPED | OUTPATIENT
Start: 2019-11-21 | End: 2020-01-03 | Stop reason: SDUPTHER

## 2019-12-10 ENCOUNTER — TELEPHONE (OUTPATIENT)
Dept: FAMILY MEDICINE CLINIC | Age: 49
End: 2019-12-10

## 2019-12-10 NOTE — TELEPHONE ENCOUNTER
Left message for patient that he needs an appointment to make medication changes, appointment available 12/20/19 at 11:30 am

## 2019-12-10 NOTE — TELEPHONE ENCOUNTER
----- Message from Roberto Montesinos sent at 12/10/2019 12:32 PM EST -----  Regarding: Dr. Juliana Ace: [phone]  Caller's first and last name: N/A  Reason for call: Questions about metformin 500mg  Callback required yes/no and why: Yes  Best contact number(s): [phone]  Details to clarify the request: Pt stated that he has some general questions about his medication metformin 500mg. Pt stated that he wants to switch metformin 500mg to something else.

## 2019-12-11 NOTE — TELEPHONE ENCOUNTER
Message left on patient voice mail instructing patient that he has an appointment on 12/20/19 at 11:30 a.m. for medication review.

## 2020-01-03 ENCOUNTER — OFFICE VISIT (OUTPATIENT)
Dept: FAMILY MEDICINE CLINIC | Age: 50
End: 2020-01-03

## 2020-01-03 VITALS
HEIGHT: 73 IN | RESPIRATION RATE: 16 BRPM | TEMPERATURE: 95.2 F | DIASTOLIC BLOOD PRESSURE: 84 MMHG | SYSTOLIC BLOOD PRESSURE: 127 MMHG | BODY MASS INDEX: 32.6 KG/M2 | HEART RATE: 93 BPM | WEIGHT: 246 LBS | OXYGEN SATURATION: 97 %

## 2020-01-03 DIAGNOSIS — Z23 ENCOUNTER FOR IMMUNIZATION: ICD-10-CM

## 2020-01-03 DIAGNOSIS — E11.9 CONTROLLED TYPE 2 DIABETES MELLITUS WITHOUT COMPLICATION, WITHOUT LONG-TERM CURRENT USE OF INSULIN (HCC): Primary | ICD-10-CM

## 2020-01-03 DIAGNOSIS — M79.10 MYALGIA: ICD-10-CM

## 2020-01-03 LAB — HBA1C MFR BLD HPLC: 7.5 %

## 2020-01-03 RX ORDER — NAPROXEN 500 MG/1
500 TABLET ORAL 2 TIMES DAILY WITH MEALS
Qty: 60 TAB | Refills: 4 | Status: SHIPPED | OUTPATIENT
Start: 2020-01-03 | End: 2020-08-18 | Stop reason: ALTCHOICE

## 2020-01-03 RX ORDER — PREDNISONE 10 MG/1
10 TABLET ORAL 2 TIMES DAILY
Qty: 20 TAB | Refills: 1 | Status: SHIPPED | OUTPATIENT
Start: 2020-01-03 | End: 2020-08-12 | Stop reason: ALTCHOICE

## 2020-01-03 RX ORDER — AMITRIPTYLINE HYDROCHLORIDE 25 MG/1
25 TABLET, FILM COATED ORAL
Qty: 30 TAB | Refills: 5 | Status: SHIPPED | OUTPATIENT
Start: 2020-01-03 | End: 2020-08-12 | Stop reason: ALTCHOICE

## 2020-01-03 NOTE — PROGRESS NOTES
HISTORY OF PRESENT ILLNESS  Jonh Langston is a 51 y.o. male. HPI has been having pain in both feet, for one month now. Pain was excruciating at times, worse when first gets out of bed in the am.   Also feels some swelling in R knee. Feet chon like a bag of bones. Has to walk on the sides of his feet. Has been taking indocin- no relief. Pain is worse after standing and working all day. Has been using some inserts- mild relief. Worried about his liver. ROS    Physical Exam  Vitals signs and nursing note reviewed. Constitutional:       Appearance: He is well-developed. HENT:      Right Ear: External ear normal.      Left Ear: External ear normal.   Neck:      Thyroid: No thyromegaly. Cardiovascular:      Rate and Rhythm: Normal rate and regular rhythm. Heart sounds: Normal heart sounds. Pulmonary:      Effort: Pulmonary effort is normal. No respiratory distress. Breath sounds: Normal breath sounds. No wheezing. Abdominal:      General: Bowel sounds are normal. There is no distension. Palpations: Abdomen is soft. There is no mass. Tenderness: There is no tenderness. There is no guarding. Musculoskeletal: Normal range of motion. Lymphadenopathy:      Cervical: No cervical adenopathy. feet- 2+ pulses bilaterally. L foot- mild tenderness over sole of foot    L foot- tenderness more in arch of foot  ASSESSMENT and PLAN  Orders Placed This Encounter    Influenza virus vaccine (QUADRIVALENT PRES FREE SYRINGE) IM (87871)    Pneumococcal polysaccharide vaccine, 23-valent, adult or immunosuppressed pt dose    METABOLIC PANEL, COMPREHENSIVE    CK    AMB POC HEMOGLOBIN A1C    FL IMMUNIZ ADMIN,1 SINGLE/COMB VAC/TOXOID    amitriptyline (ELAVIL) 25 mg tablet     Sig: Take 1 Tab by mouth nightly. For neuropathy     Dispense:  30 Tab     Refill:  5    naproxen (NAPROSYN) 500 mg tablet     Sig: Take 1 Tab by mouth two (2) times daily (with meals).      Dispense:  60 Tab     Refill:  4  predniSONE (DELTASONE) 10 mg tablet     Sig: Take 10 mg by mouth two (2) times a day. Dispense:  20 Tab     Refill:  1     Orders Placed This Encounter    Influenza virus vaccine (QUADRIVALENT PRES FREE SYRINGE) IM (91226)    Pneumococcal polysaccharide vaccine, 23-valent, adult or immunosuppressed pt dose    METABOLIC PANEL, COMPREHENSIVE    CK    AMB POC HEMOGLOBIN A1C    OH IMMUNIZ ADMIN,1 SINGLE/COMB VAC/TOXOID    amitriptyline (ELAVIL) 25 mg tablet    naproxen (NAPROSYN) 500 mg tablet    predniSONE (DELTASONE) 10 mg tablet     Diagnoses and all orders for this visit:    1. Controlled type 2 diabetes mellitus without complication, without long-term current use of insulin ([facility])  -     METABOLIC PANEL, COMPREHENSIVE  -     AMB POC HEMOGLOBIN A1C    2. Myalgia  -     CK    3. Encounter for immunization  -     INFLUENZA VIRUS VAC QUAD,SPLIT,PRESV FREE SYRINGE IM  -     PNEUMOCOCCAL POLYSACCHARIDE VACCINE, 23-VALENT, ADULT OR IMMUNOSUPPRESSED PT DOSE,  -     OH IMMUNIZ ADMIN,1 SINGLE/COMB VAC/TOXOID    Other orders  -     amitriptyline (ELAVIL) 25 mg tablet; Take 1 Tab by mouth nightly. For neuropathy  -     naproxen (NAPROSYN) 500 mg tablet; Take 1 Tab by mouth two (2) times daily (with meals). -     predniSONE (DELTASONE) 10 mg tablet; Take 10 mg by mouth two (2) times a day. to hold statin for 2 weeks to see if this helps. To stopp atorvastatin for 2 weeks first. Refer dr. Guero Casas (ortho) at [facility] if no better.

## 2020-01-03 NOTE — PROGRESS NOTES
Chief Complaint   Patient presents with    Foot Pain     1. Have you been to the ER, urgent care clinic since your last visit? Hospitalized since your last visit? No    2. Have you seen or consulted any other health care providers outside of the [address] [place] [address] since your last visit? Include any pap smears or colon screening. No     Health Maintenance Due   Topic Date Due    Pneumococcal 0-64 years (1 of 1 - PPSV23) 09/13/1977    EYE EXAM RETINAL OR DILATED  09/13/1981    Influenza Age 5 to Adult  08/01/2019    HEMOGLOBIN A1C Q6M  01/11/2020       Keegan Rai is a 51 y.o. male who presents for routine immunizations. He denies any symptoms , reactions or allergies that would exclude them from being immunized today. Risks and adverse reactions were discussed and the VIS was given to them. All questions were addressed. He was observed for 15 min post injection. There were no reactions observed.     Johnny Rich LPN

## 2020-01-04 LAB
ALBUMIN SERPL-MCNC: 4.7 G/DL (ref 3.5–5.5)
ALBUMIN/GLOB SERPL: 1.7 {RATIO} (ref 1.2–2.2)
ALP SERPL-CCNC: 85 IU/L (ref 39–117)
ALT SERPL-CCNC: 14 IU/L (ref 0–44)
AST SERPL-CCNC: 16 IU/L (ref 0–40)
BILIRUB SERPL-MCNC: 0.6 MG/DL (ref 0–1.2)
BUN SERPL-MCNC: 16 MG/DL (ref 6–24)
BUN/CREAT SERPL: 12 (ref 9–20)
CALCIUM SERPL-MCNC: 9.5 MG/DL (ref 8.7–10.2)
CHLORIDE SERPL-SCNC: 100 MMOL/L (ref 96–106)
CK SERPL-CCNC: 67 U/L (ref 24–204)
CO2 SERPL-SCNC: 20 MMOL/L (ref 20–29)
CREAT SERPL-MCNC: 1.35 MG/DL (ref 0.76–1.27)
GLOBULIN SER CALC-MCNC: 2.7 G/DL (ref 1.5–4.5)
GLUCOSE SERPL-MCNC: 170 MG/DL (ref 65–99)
POTASSIUM SERPL-SCNC: 4.7 MMOL/L (ref 3.5–5.2)
PROT SERPL-MCNC: 7.4 G/DL (ref 6–8.5)
SODIUM SERPL-SCNC: 140 MMOL/L (ref 134–144)

## 2020-01-07 ENCOUNTER — TELEPHONE (OUTPATIENT)
Dept: FAMILY MEDICINE CLINIC | Age: 50
End: 2020-01-07

## 2020-01-07 NOTE — TELEPHONE ENCOUNTER
----- Message from Traci Kent sent at 1/7/2020 10:41 AM EST -----  Regarding: Dr. Erna Crowder: [phone]  Caller's first and last name: self  Reason for call: informing Dr. Moose Jimenez required yes/no and why: yes  Best contact number(s): [phone]  Details to clarify the request: Pt is informing Dr. Massiel Hebert that he diagnosed positive for lime disease 3 yrs. ago from tick bite on his R knee. Pt stated it was looked over Friday during appt. he just remembered on last night.

## 2020-01-07 NOTE — TELEPHONE ENCOUNTER
Spoke with patient who wanted to inform Dr. Teressa Montesinos that treat for Lyme disease from 3 years ago may be needed again for right knee pain where tick was found.  Explain to patient Dr. Teressa Montesinos out of office but would follow up with when he returned for medical options

## 2020-02-17 DIAGNOSIS — F41.9 ANXIETY: ICD-10-CM

## 2020-02-17 RX ORDER — ALPRAZOLAM 1 MG/1
1 TABLET ORAL
Qty: 60 TAB | Refills: 2 | Status: SHIPPED | OUTPATIENT
Start: 2020-02-17 | End: 2020-06-01 | Stop reason: SDUPTHER

## 2020-02-18 ENCOUNTER — DOCUMENTATION ONLY (OUTPATIENT)
Dept: FAMILY MEDICINE CLINIC | Age: 50
End: 2020-02-18

## 2020-05-19 ENCOUNTER — HOSPITAL ENCOUNTER (EMERGENCY)
Age: 50
Discharge: LEFT BEFORE DISCHARGE | End: 2020-05-19
Attending: EMERGENCY MEDICINE
Payer: COMMERCIAL

## 2020-05-19 VITALS
TEMPERATURE: 97.9 F | SYSTOLIC BLOOD PRESSURE: 157 MMHG | RESPIRATION RATE: 16 BRPM | OXYGEN SATURATION: 100 % | HEART RATE: 117 BPM | DIASTOLIC BLOOD PRESSURE: 93 MMHG

## 2020-05-19 PROCEDURE — 75810000275 HC EMERGENCY DEPT VISIT NO LEVEL OF CARE

## 2020-05-19 NOTE — ED TRIAGE NOTES
Pt c/o left flank pain and left inguinal pain since yesterday, sent from urology office for probable kidney stone , denies fever, +n/v

## 2020-06-01 DIAGNOSIS — F41.9 ANXIETY: ICD-10-CM

## 2020-06-01 RX ORDER — ALPRAZOLAM 1 MG/1
1 TABLET ORAL
Qty: 60 TAB | Refills: 2 | Status: SHIPPED | OUTPATIENT
Start: 2020-06-01 | End: 2020-08-27 | Stop reason: SDUPTHER

## 2020-06-01 NOTE — TELEPHONE ENCOUNTER
Patient wants to get the medication for ALPRAZolam Unicdianelys Wilkerson) 1 mg tablet .   If any questions please give her him a call @ [phone]

## 2020-08-12 ENCOUNTER — OFFICE VISIT (OUTPATIENT)
Dept: FAMILY MEDICINE CLINIC | Age: 50
End: 2020-08-12
Payer: COMMERCIAL

## 2020-08-12 VITALS
BODY MASS INDEX: 29.45 KG/M2 | HEIGHT: 73 IN | TEMPERATURE: 97.1 F | RESPIRATION RATE: 16 BRPM | OXYGEN SATURATION: 99 % | SYSTOLIC BLOOD PRESSURE: 109 MMHG | DIASTOLIC BLOOD PRESSURE: 74 MMHG | WEIGHT: 222.2 LBS | HEART RATE: 85 BPM

## 2020-08-12 DIAGNOSIS — E78.00 HYPERCHOLESTEROLEMIA: ICD-10-CM

## 2020-08-12 DIAGNOSIS — G62.9 PERIPHERAL POLYNEUROPATHY: Primary | ICD-10-CM

## 2020-08-12 DIAGNOSIS — E11.9 CONTROLLED TYPE 2 DIABETES MELLITUS WITHOUT COMPLICATION, WITHOUT LONG-TERM CURRENT USE OF INSULIN (HCC): ICD-10-CM

## 2020-08-12 PROBLEM — E11.40 TYPE 2 DIABETES MELLITUS WITH DIABETIC NEUROPATHY (HCC): Status: ACTIVE | Noted: 2020-08-12

## 2020-08-12 LAB — HBA1C MFR BLD HPLC: 6.9 %

## 2020-08-12 PROCEDURE — 99000 SPECIMEN HANDLING OFFICE-LAB: CPT | Performed by: FAMILY MEDICINE

## 2020-08-12 PROCEDURE — 83036 HEMOGLOBIN GLYCOSYLATED A1C: CPT | Performed by: FAMILY MEDICINE

## 2020-08-12 PROCEDURE — 99213 OFFICE O/P EST LOW 20 MIN: CPT | Performed by: FAMILY MEDICINE

## 2020-08-12 RX ORDER — GABAPENTIN 300 MG/1
CAPSULE ORAL
Qty: 90 CAP | Refills: 3 | Status: SHIPPED | OUTPATIENT
Start: 2020-08-12 | End: 2021-06-14 | Stop reason: ALTCHOICE

## 2020-08-12 NOTE — PROGRESS NOTES
HISTORY OF PRESENT ILLNESS  Nesha Alarcon is a 51 y.o. male. HPI got covid 3 weeks ago, getting over it now. Had problems with anorexia and myalgias. Feels like is over itnow. Main reason for todays visit is pain in both feet. Pain is especially bad in L foot. Has been having this pain for a few years. Described as a burning and a stinging pain. Taking naproxen- mild relief. Pain makes him drowsy. Has a knot on L foot, and has difficulty walking on this foot due to pain. ROS    Physical Exam  Vitals signs and nursing note reviewed. Constitutional:       Appearance: He is well-developed. HENT:      Right Ear: External ear normal.      Left Ear: External ear normal.   Neck:      Thyroid: No thyromegaly. Cardiovascular:      Rate and Rhythm: Normal rate and regular rhythm. Heart sounds: Normal heart sounds. Pulmonary:      Effort: Pulmonary effort is normal. No respiratory distress. Breath sounds: Normal breath sounds. No wheezing. Abdominal:      General: Bowel sounds are normal. There is no distension. Palpations: Abdomen is soft. There is no mass. Tenderness: There is no abdominal tenderness. There is no guarding. Musculoskeletal: Normal range of motion. Comments: L foot- tenderness distal 3rd MT . Moderate swelling. Lymphadenopathy:      Cervical: No cervical adenopathy. ASSESSMENT and PLAN  Orders Placed This Encounter    CBC WITH AUTOMATED DIFF    METABOLIC PANEL, COMPREHENSIVE    LIPID PANEL    AMB POC HEMOGLOBIN A1C    AR HANDLG&/OR CONVEY OF SPEC FOR TR OFFICE TO LAB    gabapentin (NEURONTIN) 300 mg capsule     Diagnoses and all orders for this visit:    1. Peripheral polyneuropathy  -     gabapentin (NEURONTIN) 300 mg capsule; One cap po qhs- for one week, then one cap po bid- 2nd week. Then go to one po tid. For neuropathy  -     AR HANDLG&/OR CONVEY OF SPEC FOR TR OFFICE TO LAB    2.  Controlled type 2 diabetes mellitus without complication, without long-term current use of insulin (HCC)  -     CBC WITH AUTOMATED DIFF  -     METABOLIC PANEL, COMPREHENSIVE  -     AMB POC HEMOGLOBIN A1C  -     DC HANDLG&/OR CONVEY OF SPEC FOR TR OFFICE TO LAB    3. Hypercholesterolemia  -     LIPID PANEL  -     DC HANDLG&/OR CONVEY OF SPEC FOR TR OFFICE TO LAB          Recommended pt go to hospital for xray, he declines. He wants to come back here in one week for an xray. Worry about charcot foot.

## 2020-08-13 LAB
ALBUMIN SERPL-MCNC: 4.2 G/DL (ref 4–5)
ALBUMIN/GLOB SERPL: 1.4 {RATIO} (ref 1.2–2.2)
ALP SERPL-CCNC: 73 IU/L (ref 39–117)
ALT SERPL-CCNC: 45 IU/L (ref 0–44)
AST SERPL-CCNC: 52 IU/L (ref 0–40)
BASOPHILS # BLD AUTO: 0 X10E3/UL (ref 0–0.2)
BASOPHILS NFR BLD AUTO: 1 %
BILIRUB SERPL-MCNC: 0.7 MG/DL (ref 0–1.2)
BUN SERPL-MCNC: 23 MG/DL (ref 6–24)
BUN/CREAT SERPL: 8 (ref 9–20)
CALCIUM SERPL-MCNC: 9.7 MG/DL (ref 8.7–10.2)
CHLORIDE SERPL-SCNC: 99 MMOL/L (ref 96–106)
CHOLEST SERPL-MCNC: 215 MG/DL (ref 100–199)
CO2 SERPL-SCNC: 21 MMOL/L (ref 20–29)
CREAT SERPL-MCNC: 2.83 MG/DL (ref 0.76–1.27)
EOSINOPHIL # BLD AUTO: 0.1 X10E3/UL (ref 0–0.4)
EOSINOPHIL NFR BLD AUTO: 2 %
ERYTHROCYTE [DISTWIDTH] IN BLOOD BY AUTOMATED COUNT: 13.9 % (ref 11.6–15.4)
GLOBULIN SER CALC-MCNC: 2.9 G/DL (ref 1.5–4.5)
GLUCOSE SERPL-MCNC: 129 MG/DL (ref 65–99)
HCT VFR BLD AUTO: 38.5 % (ref 37.5–51)
HDLC SERPL-MCNC: 43 MG/DL
HGB BLD-MCNC: 13.5 G/DL (ref 13–17.7)
IMM GRANULOCYTES # BLD AUTO: 0 X10E3/UL (ref 0–0.1)
IMM GRANULOCYTES NFR BLD AUTO: 0 %
INTERPRETATION, 910389: NORMAL
INTERPRETATION: NORMAL
LDLC SERPL CALC-MCNC: 96 MG/DL (ref 0–99)
LYMPHOCYTES # BLD AUTO: 1.9 X10E3/UL (ref 0.7–3.1)
LYMPHOCYTES NFR BLD AUTO: 35 %
Lab: NORMAL
MCH RBC QN AUTO: 32.2 PG (ref 26.6–33)
MCHC RBC AUTO-ENTMCNC: 35.1 G/DL (ref 31.5–35.7)
MCV RBC AUTO: 92 FL (ref 79–97)
MONOCYTES # BLD AUTO: 0.6 X10E3/UL (ref 0.1–0.9)
MONOCYTES NFR BLD AUTO: 12 %
NEUTROPHILS # BLD AUTO: 2.8 X10E3/UL (ref 1.4–7)
NEUTROPHILS NFR BLD AUTO: 50 %
PDF IMAGE, 910387: NORMAL
PLATELET # BLD AUTO: 160 X10E3/UL (ref 150–450)
POTASSIUM SERPL-SCNC: 4.8 MMOL/L (ref 3.5–5.2)
PROT SERPL-MCNC: 7.1 G/DL (ref 6–8.5)
RBC # BLD AUTO: 4.19 X10E6/UL (ref 4.14–5.8)
SODIUM SERPL-SCNC: 139 MMOL/L (ref 134–144)
TRIGL SERPL-MCNC: 378 MG/DL (ref 0–149)
VLDLC SERPL CALC-MCNC: 76 MG/DL (ref 5–40)
WBC # BLD AUTO: 5.5 X10E3/UL (ref 3.4–10.8)

## 2020-08-17 ENCOUNTER — OFFICE VISIT (OUTPATIENT)
Dept: FAMILY MEDICINE CLINIC | Age: 50
End: 2020-08-17

## 2020-08-17 DIAGNOSIS — M79.673 PAIN OF FOOT, UNSPECIFIED LATERALITY: Primary | ICD-10-CM

## 2020-08-17 RX ORDER — METFORMIN HYDROCHLORIDE 500 MG/1
500 TABLET ORAL 2 TIMES DAILY WITH MEALS
Qty: 60 TAB | Refills: 12 | Status: SHIPPED | OUTPATIENT
Start: 2020-08-17 | End: 2020-08-18 | Stop reason: ALTCHOICE

## 2020-08-18 NOTE — PROGRESS NOTES
pc with pt. Will dc metformin and losartan and naproxen.  Make an appt in 3 weeks and we'll recheck lab and blood pressure

## 2020-08-27 DIAGNOSIS — F41.9 ANXIETY: ICD-10-CM

## 2020-08-27 RX ORDER — ALPRAZOLAM 1 MG/1
1 TABLET ORAL
Qty: 60 TAB | Refills: 2 | Status: SHIPPED | OUTPATIENT
Start: 2020-08-27 | End: 2020-11-23 | Stop reason: SDUPTHER

## 2020-08-27 NOTE — TELEPHONE ENCOUNTER
Last visit:8/17/20  Next visit:9/22/20  Previous refill 6/01/20(60+2R)    Please review  before prescribing new Rx. Thanks,  Cecy    Requested Prescriptions     Pending Prescriptions Disp Refills    ALPRAZolam (XANAX) 1 mg tablet 60 Tab 2     Sig: Take 1 Tab by mouth two (2) times daily as needed for Anxiety. Max Daily Amount: 2 mg.

## 2020-08-31 ENCOUNTER — TELEPHONE (OUTPATIENT)
Dept: FAMILY MEDICINE CLINIC | Age: 50
End: 2020-08-31

## 2020-08-31 NOTE — TELEPHONE ENCOUNTER
Pls call patient about BP medication - he states he should be coming in this week as his medications were stopped 3 wks ago       Best number to reach him is [phone]

## 2020-09-04 ENCOUNTER — OFFICE VISIT (OUTPATIENT)
Dept: FAMILY MEDICINE CLINIC | Age: 50
End: 2020-09-04
Payer: COMMERCIAL

## 2020-09-04 VITALS
OXYGEN SATURATION: 98 % | SYSTOLIC BLOOD PRESSURE: 139 MMHG | HEART RATE: 67 BPM | DIASTOLIC BLOOD PRESSURE: 93 MMHG | WEIGHT: 224 LBS | TEMPERATURE: 96.8 F | BODY MASS INDEX: 29.55 KG/M2 | RESPIRATION RATE: 16 BRPM

## 2020-09-04 DIAGNOSIS — N18.4 CRF (CHRONIC RENAL FAILURE), STAGE 4 (SEVERE) (HCC): ICD-10-CM

## 2020-09-04 DIAGNOSIS — Z23 ENCOUNTER FOR IMMUNIZATION: Primary | ICD-10-CM

## 2020-09-04 DIAGNOSIS — M79.672 LEFT FOOT PAIN: ICD-10-CM

## 2020-09-04 LAB
BILIRUB UR QL STRIP: NEGATIVE
GLUCOSE UR-MCNC: NEGATIVE MG/DL
KETONES P FAST UR STRIP-MCNC: NEGATIVE MG/DL
PH UR STRIP: 5.5 [PH] (ref 4.6–8)
PROT UR QL STRIP: NEGATIVE
SP GR UR STRIP: 1.02 (ref 1–1.03)
UA UROBILINOGEN AMB POC: NORMAL (ref 0.2–1)
URINALYSIS CLARITY POC: CLEAR
URINALYSIS COLOR POC: YELLOW
URINE BLOOD POC: NEGATIVE
URINE LEUKOCYTES POC: NEGATIVE
URINE NITRITES POC: NEGATIVE

## 2020-09-04 PROCEDURE — 99213 OFFICE O/P EST LOW 20 MIN: CPT

## 2020-09-04 PROCEDURE — 81001 URINALYSIS AUTO W/SCOPE: CPT

## 2020-09-04 PROCEDURE — 90471 IMMUNIZATION ADMIN: CPT

## 2020-09-04 PROCEDURE — 90686 IIV4 VACC NO PRSV 0.5 ML IM: CPT

## 2020-09-04 NOTE — PROGRESS NOTES
Chief Complaint   Patient presents with    Medication Refill     1. Have you been to the ER, urgent care clinic since your last visit? Hospitalized since your last visit? No    2. Have you seen or consulted any other health care providers outside of the [address] [place] [address] since your last visit? Include any pap smears or colon screening.  No     Verbal Order received from Dr. Isaac Patel for influenza given IM in left arm

## 2020-09-04 NOTE — PROGRESS NOTES
HISTORY OF PRESENT ILLNESS  Romel Wood is a 51 y.o. male. HPI continues with pain over L 2nd MT head. Has some numbness between 2nd and 3rd toes. Pain is somewhat better than when lasrt seen. Also needs blood pressure checked. Needs renal function checked. Has been taking Tylenol- mild relief. Had been taking lots of aleve and naproxen for foot pain in last 3 months. But not taking it now. ROS    Physical Exam  Vitals signs and nursing note reviewed. Constitutional:       Appearance: He is well-developed. HENT:      Right Ear: External ear normal.      Left Ear: External ear normal.   Neck:      Thyroid: No thyromegaly. Cardiovascular:      Rate and Rhythm: Normal rate and regular rhythm. Heart sounds: Normal heart sounds. Pulmonary:      Effort: Pulmonary effort is normal. No respiratory distress. Breath sounds: Normal breath sounds. No wheezing. Abdominal:      General: Bowel sounds are normal. There is no distension. Palpations: Abdomen is soft. There is no mass. Tenderness: There is no abdominal tenderness. There is no guarding. Musculoskeletal: Normal range of motion. Comments: L 2nd toe- tenderness distla 4th MT. sesnation lateral 2nd toe and medial 3rd toe somewhat decreased   Lymphadenopathy:      Cervical: No cervical adenopathy. ASSESSMENT and PLAN  Orders Placed This Encounter    Influenza virus vaccine (QUADRIVALENT PRES FREE SYRINGE) IM (40180)    METABOLIC PANEL, BASIC    URIC ACID    REFERRAL TO ORTHOPEDICS    AMB POC URINALYSIS DIP STICK AUTO W/ MICRO    TN IMMUNIZ ADMIN,1 SINGLE/COMB VAC/TOXOID     Diagnoses and all orders for this visit:    1. Encounter for immunization  -     INFLUENZA VIRUS VAC QUAD,SPLIT,PRESV FREE SYRINGE IM  -     TN IMMUNIZ ADMIN,1 SINGLE/COMB VAC/TOXOID    2.  Left foot pain  -     REFERRAL TO ORTHOPEDICS    3. CRF (chronic renal failure), stage 4 (severe) ([facility])  -     METABOLIC PANEL, BASIC  -     URIC ACID  - AMB POC URINALYSIS DIP STICK AUTO W/ MICRO          Foot pain- ? Mortons Neuroma?

## 2020-09-05 LAB
BUN SERPL-MCNC: 21 MG/DL (ref 6–24)
BUN/CREAT SERPL: 15 (ref 9–20)
CALCIUM SERPL-MCNC: 9.5 MG/DL (ref 8.7–10.2)
CHLORIDE SERPL-SCNC: 103 MMOL/L (ref 96–106)
CO2 SERPL-SCNC: 23 MMOL/L (ref 20–29)
CREAT SERPL-MCNC: 1.43 MG/DL (ref 0.76–1.27)
GLUCOSE SERPL-MCNC: 141 MG/DL (ref 65–99)
INTERPRETATION: NORMAL
POTASSIUM SERPL-SCNC: 4.2 MMOL/L (ref 3.5–5.2)
SODIUM SERPL-SCNC: 139 MMOL/L (ref 134–144)
URATE SERPL-MCNC: 10.7 MG/DL (ref 3.7–8.6)

## 2020-11-23 DIAGNOSIS — F41.9 ANXIETY: ICD-10-CM

## 2020-11-23 RX ORDER — ALPRAZOLAM 1 MG/1
1 TABLET ORAL
Qty: 60 TAB | Refills: 2 | Status: SHIPPED | OUTPATIENT
Start: 2020-11-23 | End: 2021-02-20

## 2020-11-23 NOTE — TELEPHONE ENCOUNTER
No access to      Last visit 09/04/2020 MD Jlaeesa Crespo   Next appointment None   Previous refill encounter(s)   08/27/2020 Xanax #360 with 2 refills. Requested Prescriptions     Pending Prescriptions Disp Refills    ALPRAZolam (XANAX) 1 mg tablet 60 Tab 2     Sig: Take 1 Tab by mouth two (2) times daily as needed for Anxiety. Max Daily Amount: 2 mg.

## 2021-01-20 RX ORDER — PREDNISONE 10 MG/1
TABLET ORAL
Qty: 20 TAB | Refills: 0 | Status: SHIPPED | OUTPATIENT
Start: 2021-01-20 | End: 2021-06-14 | Stop reason: ALTCHOICE

## 2021-02-20 DIAGNOSIS — F41.9 ANXIETY: ICD-10-CM

## 2021-02-20 RX ORDER — ALPRAZOLAM 1 MG/1
TABLET ORAL
Qty: 60 TAB | Refills: 2 | Status: SHIPPED | OUTPATIENT
Start: 2021-02-20 | End: 2021-06-14 | Stop reason: SDUPTHER

## 2021-03-17 RX ORDER — AMITRIPTYLINE HYDROCHLORIDE 25 MG/1
TABLET, FILM COATED ORAL
Qty: 30 TAB | Refills: 5 | Status: SHIPPED | OUTPATIENT
Start: 2021-03-17 | End: 2021-06-14

## 2021-04-01 RX ORDER — TAMSULOSIN HYDROCHLORIDE 0.4 MG/1
0.4 CAPSULE ORAL DAILY
Qty: 90 CAP | Refills: 3 | Status: SHIPPED | OUTPATIENT
Start: 2021-04-01 | End: 2021-04-02 | Stop reason: SDUPTHER

## 2021-04-01 NOTE — TELEPHONE ENCOUNTER
Patient called and left message asking if a new script for his Flomax could be called in. Patient stated he tried to fill a [facility] medication at a [facility], but it was not allowed. Was not sure on quantity or days supply for patient, therefore I did not attach a script.          Thanks,  Fernando Alva

## 2021-04-03 RX ORDER — TAMSULOSIN HYDROCHLORIDE 0.4 MG/1
0.4 CAPSULE ORAL DAILY
Qty: 90 CAP | Refills: 1 | Status: SHIPPED | OUTPATIENT
Start: 2021-04-03 | End: 2022-03-24 | Stop reason: ALTCHOICE

## 2021-05-22 DIAGNOSIS — F41.9 ANXIETY: ICD-10-CM

## 2021-05-24 RX ORDER — ALPRAZOLAM 1 MG/1
TABLET ORAL
Qty: 60 TABLET | OUTPATIENT
Start: 2021-05-24

## 2021-05-24 RX ORDER — LOSARTAN POTASSIUM 100 MG/1
TABLET ORAL
Qty: 90 TABLET | Refills: 12 | Status: SHIPPED | OUTPATIENT
Start: 2021-05-24 | End: 2022-07-25 | Stop reason: SDUPTHER

## 2021-06-01 ENCOUNTER — TELEPHONE (OUTPATIENT)
Dept: FAMILY MEDICINE CLINIC | Age: 51
End: 2021-06-01

## 2021-06-01 DIAGNOSIS — F41.9 ANXIETY: ICD-10-CM

## 2021-06-01 RX ORDER — ALPRAZOLAM 1 MG/1
TABLET ORAL
Qty: 60 TABLET | OUTPATIENT
Start: 2021-06-01

## 2021-06-01 NOTE — TELEPHONE ENCOUNTER
----- Message from [facility] sent at 6/1/2021  8:50 AM EDT -----  Regarding: Dr. Micki Avila first and last name:  N/A      Reason for call:  Status on prescription      Callback required yes/no and why:    Y      Best contact number(s):  [phone]      Details to clarify the request:  Patient has been waiting since last week for his Xanax refill. The losartan got refilled but not the Xanax (generic). He has not gotten a return phone call after multiple attempts with an update on this. The next available appointment isn't until June 25th, but he cannot wait that long.   He is requesting a call back as soon as possible to get this taken care of.      [facility]

## 2021-06-07 NOTE — TELEPHONE ENCOUNTER
----- Message from [facility] sent at 6/7/2021  1:56 PM EDT -----  Regarding: MD Myers/telephone  Level 1/Escalated Issue      Caller's first and last name and relationship (if not the patient):N/A      Best contact number(s):[phone]      What are the symptoms: N/A      Transfer successful - yes/no (include outcome):N, no one has picked up. Transfer declined - yes/no (include reason):N/A      Was caller advised to seek appropriate level of care - yes/no:N/A      Details to clarify the request:Patient stated that he has been waiting on his medication for two weeks and he has been calling and leaving messages. Patient stated that he is very frustrated and is not happy. Please leave if not reached.         Evelin Hunter

## 2021-06-14 ENCOUNTER — OFFICE VISIT (OUTPATIENT)
Dept: FAMILY MEDICINE CLINIC | Age: 51
End: 2021-06-14
Payer: COMMERCIAL

## 2021-06-14 VITALS
BODY MASS INDEX: 29.16 KG/M2 | RESPIRATION RATE: 16 BRPM | OXYGEN SATURATION: 98 % | SYSTOLIC BLOOD PRESSURE: 125 MMHG | HEIGHT: 73 IN | DIASTOLIC BLOOD PRESSURE: 77 MMHG | HEART RATE: 101 BPM | TEMPERATURE: 97.1 F | WEIGHT: 220 LBS

## 2021-06-14 DIAGNOSIS — F41.9 ANXIETY: ICD-10-CM

## 2021-06-14 DIAGNOSIS — R53.82 CHRONIC FATIGUE: ICD-10-CM

## 2021-06-14 DIAGNOSIS — E11.42 DIABETIC POLYNEUROPATHY ASSOCIATED WITH TYPE 2 DIABETES MELLITUS (HCC): ICD-10-CM

## 2021-06-14 DIAGNOSIS — E11.9 CONTROLLED TYPE 2 DIABETES MELLITUS WITHOUT COMPLICATION, WITHOUT LONG-TERM CURRENT USE OF INSULIN (HCC): ICD-10-CM

## 2021-06-14 DIAGNOSIS — R53.1 WEAKNESS: ICD-10-CM

## 2021-06-14 DIAGNOSIS — I10 ESSENTIAL HYPERTENSION: Primary | ICD-10-CM

## 2021-06-14 PROCEDURE — 99214 OFFICE O/P EST MOD 30 MIN: CPT | Performed by: FAMILY MEDICINE

## 2021-06-14 RX ORDER — ALPRAZOLAM 1 MG/1
TABLET ORAL
Qty: 60 TABLET | Refills: 5 | Status: SHIPPED | OUTPATIENT
Start: 2021-06-14 | End: 2021-12-11

## 2021-06-14 RX ORDER — METFORMIN HYDROCHLORIDE 500 MG/1
TABLET ORAL
Qty: 180 TABLET | Refills: 3 | Status: SHIPPED | OUTPATIENT
Start: 2021-06-14 | End: 2022-06-20

## 2021-06-14 RX ORDER — AMITRIPTYLINE HYDROCHLORIDE 50 MG/1
50 TABLET, FILM COATED ORAL
Qty: 30 TABLET | Refills: 5 | Status: SHIPPED | OUTPATIENT
Start: 2021-06-14 | End: 2021-06-14 | Stop reason: CLARIF

## 2021-06-14 RX ORDER — METFORMIN HYDROCHLORIDE 500 MG/1
TABLET ORAL
COMMUNITY
Start: 2021-05-05 | End: 2021-06-14 | Stop reason: SDUPTHER

## 2021-06-14 RX ORDER — DULOXETIN HYDROCHLORIDE 30 MG/1
30 CAPSULE, DELAYED RELEASE ORAL DAILY
Qty: 60 CAPSULE | Refills: 5 | Status: SHIPPED | OUTPATIENT
Start: 2021-06-14 | End: 2022-03-24 | Stop reason: ALTCHOICE

## 2021-06-14 RX ORDER — AMITRIPTYLINE HYDROCHLORIDE 25 MG/1
25 TABLET, FILM COATED ORAL
Qty: 30 TABLET | Refills: 12 | Status: SHIPPED | OUTPATIENT
Start: 2021-06-14 | End: 2022-03-24 | Stop reason: ALTCHOICE

## 2021-06-14 NOTE — PROGRESS NOTES
HISTORY OF PRESENT ILLNESS  Anali Urena is a 53 y.o. male. HPI co fatigue and also has burning and stinging in both feet. Numbness in distal feet and toes. Needs blood pressure and diabetes and  Cholesterol  Checked  Sleeping fairly well at night denies any  Increased stress. No complaints of chest pain, shortness of breath, TIAs, claudication or edema. Takes amitriptyline for pain and numbness in feet,  Thinks  This  Helps a little. Past Medical History:   Diagnosis Date    Abnormal liver enzymes     neg hep b , neg hepc, iron, mari    Anxiety     Diabetes mellitus (HCC)     GERD (gastroesophageal reflux disease)     Hypertension     Kidney stone     Psychiatric disorder     anxiety    Vitamin B12 deficiency 2/8/2016       Social History     Socioeconomic History    Marital status: SINGLE     Spouse name: Not on file    Number of children: Not on file    Years of education: Not on file    Highest education level: Not on file   Tobacco Use    Smoking status: Never Smoker    Smokeless tobacco: Current User     Types: Snuff    Tobacco comment: chews tobacco, dips   Substance and Sexual Activity    Alcohol use: Yes     Alcohol/week: 2.5 standard drinks     Types: 3 Cans of beer per week     Comment: 3-4 beers per night    Drug use: No    Sexual activity: Yes     Partners: Female     Birth control/protection: Pill   Social History Narrative    Works at [employer], does [employer]. 1 child from an earlier relationship, 6year old daughter. Social Determinants of Health     Financial Resource Strain:     Difficulty of Paying Living Expenses:    Food Insecurity:     Worried About Running Out of Food in the Last Year:     [address] in the Last Year:    Transportation Needs:     Lack of Transportation (Medical):      Lack of Transportation (Non-Medical):    Physical Activity:     Days of Exercise per Week:     Minutes of Exercise per Session:    Stress:     Feeling of Stress :    Social Connections:     Frequency of Communication with Friends and Family:     Frequency of Social Gatherings with Friends and Family:     Attends Christian Services:     Active Member of Clubs or Organizations:     Attends Club or Organization Meetings:     Marital Status:        Current Outpatient Medications   Medication Sig Dispense Refill    ALPRAZolam (XANAX) 1 mg tablet TAKE 1 TABLET BY MOUTH TWICE DAILY AS NEEDED FOR ANXIETY. MAX DAILY AMOUNT: 2 MG 60 Tablet 5    DULoxetine (CYMBALTA) 30 mg capsule Take 1 Capsule by mouth daily. 60 Capsule 5    amitriptyline (ELAVIL) 25 mg tablet Take 1 Tablet by mouth nightly. For burning in feet. 30 Tablet 12    metFORMIN (GLUCOPHAGE) 500 mg tablet TAKE 1 TABLET BY MOUTH TWICE DAILY WITH MEALS FOR DIABETES 180 Tablet 3    losartan (COZAAR) 100 mg tablet TAKE 1 TABLET BY MOUTH ONCE DAILY FOR BLOOD PRESSURE 90 Tablet 12    tamsulosin (FLOMAX) 0.4 mg capsule Take 1 Cap by mouth daily. 90 Cap 1           ROS    Physical Exam  Vitals and nursing note reviewed. Constitutional:       Appearance: He is well-developed. HENT:      Right Ear: External ear normal.      Left Ear: External ear normal.   Neck:      Thyroid: No thyromegaly. Cardiovascular:      Rate and Rhythm: Normal rate and regular rhythm. Pulses: Normal pulses. Heart sounds: Normal heart sounds. Pulmonary:      Effort: Pulmonary effort is normal. No respiratory distress. Breath sounds: Normal breath sounds. No wheezing. Abdominal:      General: Bowel sounds are normal. There is no distension. Palpations: Abdomen is soft. There is no mass. Tenderness: There is no abdominal tenderness. There is no guarding. Musculoskeletal:         General: Normal range of motion. Lymphadenopathy:      Cervical: No cervical adenopathy.    Neurological:      Comments: Decreased sensation in feet bilaterally         ASSESSMENT and PLAN  Orders Placed This Encounter    CBC WITH AUTOMATED DIFF  TSH 3RD GENERATION    T4, FREE    HEMOGLOBIN A1C WITH EAG    LIPID PANEL    METABOLIC PANEL, COMPREHENSIVE    DISCONTD: amitriptyline (ELAVIL) 50 mg tablet    ALPRAZolam (XANAX) 1 mg tablet    DULoxetine (CYMBALTA) 30 mg capsule    amitriptyline (ELAVIL) 25 mg tablet    metFORMIN (GLUCOPHAGE) 500 mg tablet     Diagnoses and all orders for this visit:    1. Essential hypertension  -     METABOLIC PANEL, COMPREHENSIVE; Future    2. Weakness  -     TSH 3RD GENERATION; Future  -     T4, FREE; Future    3. Chronic fatigue  -     CBC WITH AUTOMATED DIFF; Future    4. Controlled type 2 diabetes mellitus without complication, without long-term current use of insulin ([facility])  -     HEMOGLOBIN A1C WITH EAG; Future  -     LIPID PANEL; Future    5. Anxiety  -     ALPRAZolam (XANAX) 1 mg tablet; TAKE 1 TABLET BY MOUTH TWICE DAILY AS NEEDED FOR ANXIETY. MAX DAILY AMOUNT: 2 MG    6. Diabetic polyneuropathy associated with type 2 diabetes mellitus ([facility] [address]    Other orders  -     DULoxetine (CYMBALTA) 30 mg capsule; Take 1 Capsule by mouth daily. -     amitriptyline (ELAVIL) 25 mg tablet; Take 1 Tablet by mouth nightly. For burning in feet. -     metFORMIN (GLUCOPHAGE) 500 mg tablet; TAKE 1 TABLET BY MOUTH TWICE DAILY WITH MEALS FOR DIABETES      Follow-up and Dispositions    · Return in about 6 months (around 12/14/2021).

## 2021-06-14 NOTE — PROGRESS NOTES
Chief Complaint   Patient presents with    Diabetes    Hypertension    Follow-up     1. Have you been to the ER, urgent care clinic since your last visit? Hospitalized since your last visit? No    2. Have you seen or consulted any other health care providers outside of the [address] [place] [address] since your last visit? Include any pap smears or colon screening.  No\

## 2021-06-15 LAB
ALBUMIN SERPL-MCNC: 4.4 G/DL (ref 4–5)
ALBUMIN/GLOB SERPL: 1.6 {RATIO} (ref 1.2–2.2)
ALP SERPL-CCNC: 67 IU/L (ref 48–121)
ALT SERPL-CCNC: 28 IU/L (ref 0–44)
AST SERPL-CCNC: 41 IU/L (ref 0–40)
BASOPHILS # BLD AUTO: 0 X10E3/UL (ref 0–0.2)
BASOPHILS NFR BLD AUTO: 1 %
BILIRUB SERPL-MCNC: 0.6 MG/DL (ref 0–1.2)
BUN SERPL-MCNC: 19 MG/DL (ref 6–24)
BUN/CREAT SERPL: 11 (ref 9–20)
CALCIUM SERPL-MCNC: 10.4 MG/DL (ref 8.7–10.2)
CHLORIDE SERPL-SCNC: 100 MMOL/L (ref 96–106)
CHOLEST SERPL-MCNC: 227 MG/DL (ref 100–199)
CO2 SERPL-SCNC: 17 MMOL/L (ref 20–29)
CREAT SERPL-MCNC: 1.71 MG/DL (ref 0.76–1.27)
EOSINOPHIL # BLD AUTO: 0.1 X10E3/UL (ref 0–0.4)
EOSINOPHIL NFR BLD AUTO: 2 %
ERYTHROCYTE [DISTWIDTH] IN BLOOD BY AUTOMATED COUNT: 13.1 % (ref 11.6–15.4)
EST. AVERAGE GLUCOSE BLD GHB EST-MCNC: 160 MG/DL
GLOBULIN SER CALC-MCNC: 2.8 G/DL (ref 1.5–4.5)
GLUCOSE SERPL-MCNC: 134 MG/DL (ref 65–99)
HBA1C MFR BLD: 7.2 % (ref 4.8–5.6)
HCT VFR BLD AUTO: 40.7 % (ref 37.5–51)
HDLC SERPL-MCNC: 67 MG/DL
HGB BLD-MCNC: 14.4 G/DL (ref 13–17.7)
IMM GRANULOCYTES # BLD AUTO: 0 X10E3/UL (ref 0–0.1)
IMM GRANULOCYTES NFR BLD AUTO: 0 %
IMP & REVIEW OF LAB RESULTS: NORMAL
INTERPRETATION: NORMAL
LDLC SERPL CALC-MCNC: 116 MG/DL (ref 0–99)
LYMPHOCYTES # BLD AUTO: 2 X10E3/UL (ref 0.7–3.1)
LYMPHOCYTES NFR BLD AUTO: 32 %
MCH RBC QN AUTO: 32.8 PG (ref 26.6–33)
MCHC RBC AUTO-ENTMCNC: 35.4 G/DL (ref 31.5–35.7)
MCV RBC AUTO: 93 FL (ref 79–97)
MONOCYTES # BLD AUTO: 0.5 X10E3/UL (ref 0.1–0.9)
MONOCYTES NFR BLD AUTO: 8 %
NEUTROPHILS # BLD AUTO: 3.6 X10E3/UL (ref 1.4–7)
NEUTROPHILS NFR BLD AUTO: 57 %
PLATELET # BLD AUTO: 195 X10E3/UL (ref 150–450)
POTASSIUM SERPL-SCNC: 4.4 MMOL/L (ref 3.5–5.2)
PROT SERPL-MCNC: 7.2 G/DL (ref 6–8.5)
RBC # BLD AUTO: 4.39 X10E6/UL (ref 4.14–5.8)
SODIUM SERPL-SCNC: 138 MMOL/L (ref 134–144)
T4 FREE SERPL-MCNC: 0.89 NG/DL (ref 0.82–1.77)
TRIGL SERPL-MCNC: 256 MG/DL (ref 0–149)
TSH SERPL DL<=0.005 MIU/L-ACNC: 3.44 UIU/ML (ref 0.45–4.5)
VLDLC SERPL CALC-MCNC: 44 MG/DL (ref 5–40)
WBC # BLD AUTO: 6.2 X10E3/UL (ref 3.4–10.8)

## 2021-06-21 RX ORDER — ATORVASTATIN CALCIUM 20 MG/1
20 TABLET, FILM COATED ORAL DAILY
Qty: 30 TABLET | Refills: 1
Start: 2021-06-21 | End: 2021-08-16

## 2021-08-16 RX ORDER — ATORVASTATIN CALCIUM 20 MG/1
TABLET, FILM COATED ORAL
Qty: 90 TABLET | Refills: 3 | Status: SHIPPED | OUTPATIENT
Start: 2021-08-16 | End: 2022-06-21

## 2021-08-16 RX ORDER — PREDNISONE 10 MG/1
TABLET ORAL
Qty: 20 TABLET | Refills: 0 | Status: SHIPPED | OUTPATIENT
Start: 2021-08-16 | End: 2021-12-20

## 2021-11-17 RX ORDER — NAPROXEN 500 MG/1
TABLET ORAL
Qty: 60 TABLET | Refills: 2 | Status: SHIPPED | OUTPATIENT
Start: 2021-11-17 | End: 2022-03-24 | Stop reason: ALTCHOICE

## 2021-12-11 DIAGNOSIS — F41.9 ANXIETY: ICD-10-CM

## 2021-12-11 RX ORDER — ALPRAZOLAM 1 MG/1
TABLET ORAL
Qty: 60 TABLET | Refills: 2 | Status: SHIPPED | OUTPATIENT
Start: 2021-12-11 | End: 2022-03-24 | Stop reason: SDUPTHER

## 2021-12-20 RX ORDER — PREDNISONE 10 MG/1
TABLET ORAL
Qty: 20 TABLET | Refills: 0 | Status: SHIPPED | OUTPATIENT
Start: 2021-12-20 | End: 2022-03-24 | Stop reason: SDUPTHER

## 2022-01-17 RX ORDER — INDOMETHACIN 50 MG/1
50 CAPSULE ORAL
Qty: 90 CAPSULE | Refills: 2 | Status: SHIPPED | OUTPATIENT
Start: 2022-01-17 | End: 2022-03-24 | Stop reason: ALTCHOICE

## 2022-01-17 NOTE — TELEPHONE ENCOUNTER
----- Message from Edd Jewell sent at 1/3/2022  2:53 PM EST -----  Subject: Message to Provider    QUESTIONS  Information for Provider? patient is needing a refill of the indomethacin,   because he is having a flare up. He is needing this called into [facility], [address] [place] [address] AT [address]  ---------------------------------------------------------------------------  --------------  CALL BACK INFO  What is the best way for the office to contact you? OK to leave message on   voicemail  Preferred Call Back Phone Number? [phone]  ---------------------------------------------------------------------------  --------------  SCRIPT ANSWERS  Relationship to Patient?  Self

## 2022-03-01 ENCOUNTER — OFFICE VISIT (OUTPATIENT)
Dept: ORTHOPEDIC SURGERY | Age: 52
End: 2022-03-01
Payer: COMMERCIAL

## 2022-03-01 VITALS — BODY MASS INDEX: 30.48 KG/M2 | WEIGHT: 230 LBS | HEIGHT: 73 IN

## 2022-03-01 DIAGNOSIS — M77.41 METATARSALGIA OF BOTH FEET: Primary | ICD-10-CM

## 2022-03-01 DIAGNOSIS — M79.671 PAIN IN BOTH FEET: ICD-10-CM

## 2022-03-01 DIAGNOSIS — M21.42 PES PLANUS OF BOTH FEET: ICD-10-CM

## 2022-03-01 DIAGNOSIS — M77.42 METATARSALGIA OF BOTH FEET: Primary | ICD-10-CM

## 2022-03-01 DIAGNOSIS — M21.41 PES PLANUS OF BOTH FEET: ICD-10-CM

## 2022-03-01 DIAGNOSIS — M79.672 PAIN IN BOTH FEET: ICD-10-CM

## 2022-03-01 DIAGNOSIS — E11.40 TYPE 2 DIABETES MELLITUS WITH DIABETIC NEUROPATHY, UNSPECIFIED WHETHER LONG TERM INSULIN USE (HCC): ICD-10-CM

## 2022-03-01 PROCEDURE — 99203 OFFICE O/P NEW LOW 30 MIN: CPT | Performed by: ORTHOPAEDIC SURGERY

## 2022-03-01 NOTE — LETTER
3/1/2022    Patient: Natanael Pruett   YOB: 1970   Date of Visit: 3/1/2022     Nik Abbott MD  [address] [place] [address]  [facility] [address]    Dear Nik Abbott MD,      Thank you for referring Mr. Alexandria Calzada to [facility] for evaluation. My notes for this consultation are attached. If you have questions, please do not hesitate to call me. I look forward to following your patient along with you.       Sincerely,    Lowanda Schilder, MD

## 2022-03-01 NOTE — PROGRESS NOTES
Konstantin Taylor (: 1970) is a 49 y.o. male, patient,here for evaluation of the following   Chief Complaint   Patient presents with    Foot Pain     right foot pain, works on concrete, flat footed has a lot of foot pain that goes up to his knee and his hip         ASSESSMENT/PLAN:  Below is the assessment and plan developed based on review of pertinent history, physical exam, labs, studies, and medications. 1. Metatarsalgia of both feet  2. Pain in both feet  -     XR STANDING FOOT RT MIN 3 V; Future  3. Pes planus of both feet  4. Type 2 diabetes mellitus with diabetic neuropathy, unspecified whether long term insulin use ([facility] [address]      Patient has metatarsalgia most of the pain focused to the forefoot without any other findings, focused to the second and third metatarsal heads and plantar forefoot. At length discussion had about this condition, patient educated about metatarsalgia and why it happens, and some of the risk include having flat feet which he does have and he is very tight at the posterior muscles and tendons of lower extremities. So I want him to start stretching program as demonstrated today in clinic. Provided handout of information of exercises to do and also provided different types of shoes and insoles to try. If his symptoms do not improve after trying this for at least 2 months, we can reevaluate. At this point no surgical indications. He is also concerned about being \"prediabetic\" and there is some numbness into his toes, I suspect some early signs of peripheral neuropathy, confirmation could be made by other studies but based on exam it appears could be early signs of peripheral neuropathy and he has been diagnosed with that in recent past.  Further discussion about diabetic foot care will be had next time he returns. No x-rays are needed next time. Return in about 6 weeks (around 2022), or if symptoms worsen or fail to improve.       Allergies   Allergen Reactions    Dilaudid [Hydromorphone] Hives    Hydrochlorothiazide Other (comments)     Hyperuricemia/gout    Lisinopril Diarrhea     Patient is taking this medication     Metformin Itching     Patient is taking this medication        Current Outpatient Medications   Medication Sig    indomethacin (INDOCIN) 50 mg capsule Take 1 Capsule by mouth three (3) times daily as needed for Gout or Pain for up to 90 days.  predniSONE (DELTASONE) 10 mg tablet TAKE 1 TABLET BY MOUTH TWICE DAILY    ALPRAZolam (XANAX) 1 mg tablet TAKE 1 TABLET BY MOUTH TWICE DAILY AS NEEDED FOR ANXIETY. MAX DAILY AMOUNT: 2 MG    naproxen (NAPROSYN) 500 mg tablet TAKE 1 TABLET BY MOUTH TWICE DAILY FOR FOOT PAIN    atorvastatin (LIPITOR) 20 mg tablet TAKE 1 TABLET BY MOUTH EVERY DAY    DULoxetine (CYMBALTA) 30 mg capsule Take 1 Capsule by mouth daily.  amitriptyline (ELAVIL) 25 mg tablet Take 1 Tablet by mouth nightly. For burning in feet.  metFORMIN (GLUCOPHAGE) 500 mg tablet TAKE 1 TABLET BY MOUTH TWICE DAILY WITH MEALS FOR DIABETES    losartan (COZAAR) 100 mg tablet TAKE 1 TABLET BY MOUTH ONCE DAILY FOR BLOOD PRESSURE    tamsulosin (FLOMAX) 0.4 mg capsule Take 1 Cap by mouth daily. No current facility-administered medications for this visit.        Past Medical History:   Diagnosis Date    Abnormal liver enzymes     neg hep b , neg hepc, iron, mari    Anxiety     Diabetes mellitus (HCC)     GERD (gastroesophageal reflux disease)     Hypertension     Kidney stone     Psychiatric disorder     anxiety    Vitamin B12 deficiency 2/8/2016       Past Surgical History:   Procedure Laterality Date   Juliano Irons CHOLECYSTECTOMY  10-19-13    [facility]-Dr. Arevalo Coop    HX HEENT      right eye surgery       Family History   Problem Relation Age of Onset    Cancer Father     Heart Disease Father        Social History     Socioeconomic History    Marital status: SINGLE     Spouse name: Not on file    Number of children: Not on file    Years of education: Not on file    Highest education level: Not on file   Occupational History    Not on file   Tobacco Use    Smoking status: Never Smoker    Smokeless tobacco: Current User     Types: Snuff    Tobacco comment: chews tobacco, dips   Substance and Sexual Activity    Alcohol use: Yes     Alcohol/week: 2.5 standard drinks     Types: 3 Cans of beer per week     Comment: 3-4 beers per night    Drug use: No    Sexual activity: Yes     Partners: Female     Birth control/protection: Pill   Other Topics Concern    Not on file   Social History Narrative    Works at [employer], does [employer]. 1 child from an earlier relationship, 6year old daughter. Social Determinants of Health     Financial Resource Strain:     Difficulty of Paying Living Expenses: Not on file   Food Insecurity:     Worried About Running Out of Food in the Last Year: Not on file    Jalyn of Food in the Last Year: Not on file   Transportation Needs:     Lack of Transportation (Medical): Not on file    Lack of Transportation (Non-Medical):  Not on file   Physical Activity:     Days of Exercise per Week: Not on file    Minutes of Exercise per Session: Not on file   Stress:     Feeling of Stress : Not on file   Social Connections:     Frequency of Communication with Friends and Family: Not on file    Frequency of Social Gatherings with Friends and Family: Not on file    Attends Islam Services: Not on file    Active Member of [address] [place] [address] or Organizations: Not on file    Attends Club or Organization Meetings: Not on file    Marital Status: Not on file   Intimate Partner Violence:     Fear of Current or Ex-Partner: Not on file    Emotionally Abused: Not on file    Physically Abused: Not on file    Sexually Abused: Not on file   Housing Stability:     Unable to Pay for Housing in the Last Year: Not on file    Number of [place] in the Last Year: Not on file    Unstable Housing in the Last Year: Not on file           Vitals:  Ht 6' 1\" (1.854 m)   Wt 230 lb (104.3 kg)   BMI 30.34 kg/m²    Body mass index is 30.34 kg/m². ROS     Positive for: Musculoskeletal (right foot)    Negative for: Constitutional, Gastrointestinal, Neurological, Skin, Genitourinary, HENT, Endocrine, Cardiovascular, Eyes, Respiratory, Psychiatric, Allergic/Imm, Heme/Lymph    Last edited by Bre Donis on 3/1/2022  8:37 AM. (History)              SUBJECTIVE/OBJECTIVE:  Ajit Velazquez (: 1970)   New patient presents today with complaint of bilateral foot pain at the forefoot occasional numbness and tingling but pain is under the second and third metatarsal area of forefoot, this right foot is worse than the left foot. This is been ongoing for years and he has seen many podiatrist for this problem, he has been prescribed different types of insoles and shoes and is just not improved. He describes his pain as severe sharp stabbing throbbing pain that comes and goes and there is occasional swelling and numbness, tingling weakness sensations. Mostly stepping on the forefoot causes pain. He has no change in symptoms despite rest, elevation, prednisone, Aleve, Tylenol arthritis. He is diabetic, hypertension and is a smoker of cigarettes. Physical Exam  Pleasant well-nourished male , alert and oriented to person, time and place, no acute distress. Mostly normal gait, normal weightbearing stance. Bilateral ankle: There is tightness to dorsiflexion plantar foot, positive Silfverskiold test, Achilles tendon is minimally tender but tight, intact, medial and lateral malleolus nontender, no swelling, ligaments grossly stable. Negative Broderick test, negative ankle squeeze test.    Bilateral foot: There is pes planus position to foot, able to do a single-leg heel raise stance, diffuse tenderness under the foot focused to the forefoot under the second, third and fourth metatarsal heads right more than left, there is mild calluses present.   The webspace is not severely tender, negative Giovanni's test, no open wounds or lesions, sensation is grossly intact but somewhat decreased with 5.07 filament testing at lesser toes mostly, but satisfactory for sensation. Dorsalis pedis pulse not easily palpable but capillary refill present. Neurovascular: Capillary refill intact, sensation mostly intact with the exception of lesser toes, strength for flexion/extension toes and ankle 5/5. Skin intact without open wounds, lesions or ulcers, no skin discolorations, normal warmth to skin. Imaging:    XR Results (most recent):  Results from Appointment encounter on 03/01/22    XR STANDING FOOT RT MIN 3 V    Narrative  Right foot AP, lateral and oblique standing x-rays show no acute abnormalities, fractures or dislocations. There is satisfactory bone density. There is some midfoot arthritis and on lateral view with notable pes planus foot mild to moderate. No soft tissue swelling. An electronic signature was used to authenticate this note.   -- Anay Blanco MD

## 2022-03-19 PROBLEM — E11.40 TYPE 2 DIABETES MELLITUS WITH DIABETIC NEUROPATHY (HCC): Status: ACTIVE | Noted: 2020-08-12

## 2022-03-24 ENCOUNTER — OFFICE VISIT (OUTPATIENT)
Dept: FAMILY MEDICINE CLINIC | Age: 52
End: 2022-03-24
Payer: COMMERCIAL

## 2022-03-24 VITALS
OXYGEN SATURATION: 98 % | DIASTOLIC BLOOD PRESSURE: 81 MMHG | SYSTOLIC BLOOD PRESSURE: 108 MMHG | WEIGHT: 216.6 LBS | HEIGHT: 73 IN | BODY MASS INDEX: 28.71 KG/M2 | HEART RATE: 82 BPM | TEMPERATURE: 97.8 F | RESPIRATION RATE: 16 BRPM

## 2022-03-24 DIAGNOSIS — F41.9 ANXIETY: ICD-10-CM

## 2022-03-24 DIAGNOSIS — M77.41 METATARSALGIA OF BOTH FEET: ICD-10-CM

## 2022-03-24 DIAGNOSIS — I10 ESSENTIAL HYPERTENSION: Primary | ICD-10-CM

## 2022-03-24 DIAGNOSIS — E78.00 HYPERCHOLESTEROLEMIA: ICD-10-CM

## 2022-03-24 DIAGNOSIS — M77.42 METATARSALGIA OF BOTH FEET: ICD-10-CM

## 2022-03-24 DIAGNOSIS — E11.9 CONTROLLED TYPE 2 DIABETES MELLITUS WITHOUT COMPLICATION, WITHOUT LONG-TERM CURRENT USE OF INSULIN (HCC): ICD-10-CM

## 2022-03-24 PROCEDURE — 99214 OFFICE O/P EST MOD 30 MIN: CPT | Performed by: FAMILY MEDICINE

## 2022-03-24 RX ORDER — PREDNISONE 10 MG/1
10 TABLET ORAL 2 TIMES DAILY
Qty: 20 TABLET | Refills: 3 | Status: SHIPPED | OUTPATIENT
Start: 2022-03-24 | End: 2022-08-01

## 2022-03-24 RX ORDER — ALPRAZOLAM 1 MG/1
TABLET ORAL
Qty: 60 TABLET | Refills: 5 | Status: SHIPPED | OUTPATIENT
Start: 2022-03-24 | End: 2022-09-21

## 2022-03-24 NOTE — PROGRESS NOTES
Chief Complaint   Patient presents with    Medication Refill    Diabetes    Hypertension    Vitamin B12 Deficiency       1. \"Have you been to the ER, urgent care clinic since your last visit? Hospitalized since your last visit? \" No    2. \"Have you seen or consulted any other health care providers outside of the [address] [place] [address] since your last visit? \" Yes Dr. Katja Rincon      3. For patients aged 39-70: Has the patient had a colonoscopy / FIT/ Cologuard? Yes - no Care Gap present      If the patient is female:    4. For patients aged 41-77: Has the patient had a mammogram within the past 2 years? NA - based on age or sex      11. For patients aged 21-65: Has the patient had a pap smear?  NA - based on age or sex    Health Maintenance Due   Topic Date Due    COVID-19 Vaccine (1) Never done    Eye Exam Retinal or Dilated  Never done    Foot Exam Q1  07/11/2020    MICROALBUMIN Q1  07/11/2020    Flu Vaccine (1) 09/01/2021    Shingrix Vaccine Age 50> (1 of 2) Never done

## 2022-03-24 NOTE — PROGRESS NOTES
HISTORY OF PRESENT Hollie Perez is a 49 y.o. male. HPI Still having pain and numbness in both feet. Pain is worse when is at work, when walks on concrete. Some nighttime pain, pain is better when walks, is worst when sits down. Amitriptyline- didn't help that much. Has been to see Dr. Kathleen Hightower, who has dxed him with metatarsalgia. .   Also gets some flushing in face at times. Needs blood pressure, cholesterol and diabetes checked also. No complaints of chest pain, shortness of breath, TIAs, claudication or edema. Needs refill of xanax. ROS    Physical Exam  Vitals and nursing note reviewed. Constitutional:       Appearance: He is well-developed. HENT:      Right Ear: External ear normal.      Left Ear: External ear normal.   Neck:      Thyroid: No thyromegaly. Cardiovascular:      Rate and Rhythm: Normal rate and regular rhythm. Heart sounds: Normal heart sounds. Pulmonary:      Effort: Pulmonary effort is normal. No respiratory distress. Breath sounds: Normal breath sounds. No wheezing. Abdominal:      General: Bowel sounds are normal. There is no distension. Palpations: Abdomen is soft. There is no mass. Tenderness: There is no abdominal tenderness. There is no guarding. Musculoskeletal:         General: Normal range of motion. Comments: Tenderness metatarsal heads bilaterally   Lymphadenopathy:      Cervical: No cervical adenopathy. ASSESSMENT and PLAN  Orders Placed This Encounter    MICROALBUMIN, UR, RAND W/ MICROALB/CREAT RATIO    METABOLIC PANEL, COMPREHENSIVE    LIPID PANEL    CBC WITH AUTOMATED DIFF    HEMOGLOBIN A1C WITH EAG    URINALYSIS W/MICROSCOPIC    ALPRAZolam (XANAX) 1 mg tablet    predniSONE (DELTASONE) 10 mg tablet     Diagnoses and all orders for this visit:    1. Essential hypertension  -     METABOLIC PANEL, COMPREHENSIVE; Future  -     CBC WITH AUTOMATED DIFF; Future  -     URINALYSIS W/MICROSCOPIC; Future    2.  Controlled type 2 diabetes mellitus without complication, without long-term current use of insulin (HCC)  -     MICROALBUMIN, UR, RAND W/ MICROALB/CREAT RATIO; Future  -     LIPID PANEL; Future  -     HEMOGLOBIN A1C WITH EAG; Future    3. Anxiety  -     ALPRAZolam (XANAX) 1 mg tablet; One po bid prn stress    4. Hypercholesterolemia    5. Metatarsalgia of both feet    Other orders  -     predniSONE (DELTASONE) 10 mg tablet; Take 10 mg by mouth two (2) times a day. Follow-up and Dispositions    · Return in about 6 months (around 9/24/2022).

## 2022-03-25 LAB
ALBUMIN SERPL-MCNC: 4.5 G/DL (ref 3.5–5)
ALBUMIN/GLOB SERPL: 1.3 {RATIO} (ref 1.1–2.2)
ALP SERPL-CCNC: 71 U/L (ref 45–117)
ALT SERPL-CCNC: 42 U/L (ref 12–78)
ANION GAP SERPL CALC-SCNC: 7 MMOL/L (ref 5–15)
APPEARANCE UR: CLEAR
AST SERPL-CCNC: 24 U/L (ref 15–37)
BACTERIA URNS QL MICRO: NEGATIVE /HPF
BASOPHILS # BLD: 0 K/UL (ref 0–0.1)
BASOPHILS NFR BLD: 1 % (ref 0–1)
BILIRUB SERPL-MCNC: 0.7 MG/DL (ref 0.2–1)
BILIRUB UR QL: NEGATIVE
BUN SERPL-MCNC: 15 MG/DL (ref 6–20)
BUN/CREAT SERPL: 12 (ref 12–20)
CALCIUM SERPL-MCNC: 9.6 MG/DL (ref 8.5–10.1)
CHLORIDE SERPL-SCNC: 105 MMOL/L (ref 97–108)
CHOLEST SERPL-MCNC: 144 MG/DL
CO2 SERPL-SCNC: 25 MMOL/L (ref 21–32)
COLOR UR: NORMAL
CREAT SERPL-MCNC: 1.3 MG/DL (ref 0.7–1.3)
CREAT UR-MCNC: 67.6 MG/DL
DIFFERENTIAL METHOD BLD: ABNORMAL
EOSINOPHIL # BLD: 0 K/UL (ref 0–0.4)
EOSINOPHIL NFR BLD: 0 % (ref 0–7)
EPITH CASTS URNS QL MICRO: NORMAL /LPF
ERYTHROCYTE [DISTWIDTH] IN BLOOD BY AUTOMATED COUNT: 12.7 % (ref 11.5–14.5)
EST. AVERAGE GLUCOSE BLD GHB EST-MCNC: 131 MG/DL
GLOBULIN SER CALC-MCNC: 3.5 G/DL (ref 2–4)
GLUCOSE SERPL-MCNC: 117 MG/DL (ref 65–100)
GLUCOSE UR STRIP.AUTO-MCNC: NEGATIVE MG/DL
HBA1C MFR BLD: 6.2 % (ref 4–5.6)
HCT VFR BLD AUTO: 46 % (ref 36.6–50.3)
HDLC SERPL-MCNC: 56 MG/DL
HDLC SERPL: 2.6 {RATIO} (ref 0–5)
HGB BLD-MCNC: 15.4 G/DL (ref 12.1–17)
HGB UR QL STRIP: NEGATIVE
HYALINE CASTS URNS QL MICRO: NORMAL /LPF (ref 0–5)
IMM GRANULOCYTES # BLD AUTO: 0 K/UL (ref 0–0.04)
IMM GRANULOCYTES NFR BLD AUTO: 1 % (ref 0–0.5)
KETONES UR QL STRIP.AUTO: NEGATIVE MG/DL
LDLC SERPL CALC-MCNC: 53.4 MG/DL (ref 0–100)
LEUKOCYTE ESTERASE UR QL STRIP.AUTO: NEGATIVE
LYMPHOCYTES # BLD: 1.8 K/UL (ref 0.8–3.5)
LYMPHOCYTES NFR BLD: 29 % (ref 12–49)
MCH RBC QN AUTO: 32.2 PG (ref 26–34)
MCHC RBC AUTO-ENTMCNC: 33.5 G/DL (ref 30–36.5)
MCV RBC AUTO: 96.2 FL (ref 80–99)
MICROALBUMIN UR-MCNC: 0.68 MG/DL
MICROALBUMIN/CREAT UR-RTO: 10 MG/G (ref 0–30)
MONOCYTES # BLD: 0.5 K/UL (ref 0–1)
MONOCYTES NFR BLD: 7 % (ref 5–13)
NEUTS SEG # BLD: 4.1 K/UL (ref 1.8–8)
NEUTS SEG NFR BLD: 62 % (ref 32–75)
NITRITE UR QL STRIP.AUTO: NEGATIVE
NRBC # BLD: 0 K/UL (ref 0–0.01)
NRBC BLD-RTO: 0 PER 100 WBC
PH UR STRIP: 5.5 [PH] (ref 5–8)
PLATELET # BLD AUTO: 203 K/UL (ref 150–400)
PMV BLD AUTO: 11.2 FL (ref 8.9–12.9)
POTASSIUM SERPL-SCNC: 4.4 MMOL/L (ref 3.5–5.1)
PROT SERPL-MCNC: 8 G/DL (ref 6.4–8.2)
PROT UR STRIP-MCNC: NEGATIVE MG/DL
RBC # BLD AUTO: 4.78 M/UL (ref 4.1–5.7)
RBC #/AREA URNS HPF: NORMAL /HPF (ref 0–5)
SODIUM SERPL-SCNC: 137 MMOL/L (ref 136–145)
SP GR UR REFRACTOMETRY: 1.01 (ref 1–1.03)
TRIGL SERPL-MCNC: 173 MG/DL (ref ?–150)
UROBILINOGEN UR QL STRIP.AUTO: 0.2 EU/DL (ref 0.2–1)
VLDLC SERPL CALC-MCNC: 34.6 MG/DL
WBC # BLD AUTO: 6.4 K/UL (ref 4.1–11.1)
WBC URNS QL MICRO: NORMAL /HPF (ref 0–4)

## 2022-06-20 RX ORDER — TAMSULOSIN HYDROCHLORIDE 0.4 MG/1
CAPSULE ORAL
Qty: 90 CAPSULE | Refills: 3 | Status: SHIPPED | OUTPATIENT
Start: 2022-06-20

## 2022-06-20 RX ORDER — METFORMIN HYDROCHLORIDE 500 MG/1
TABLET ORAL
Qty: 180 TABLET | Refills: 3 | Status: SHIPPED | OUTPATIENT
Start: 2022-06-20 | End: 2022-10-20

## 2022-06-21 RX ORDER — ATORVASTATIN CALCIUM 20 MG/1
TABLET, FILM COATED ORAL
Qty: 90 TABLET | Refills: 3 | Status: SHIPPED | OUTPATIENT
Start: 2022-06-21

## 2022-07-25 RX ORDER — LOSARTAN POTASSIUM 100 MG/1
TABLET ORAL
Qty: 90 TABLET | Refills: 2 | Status: SHIPPED | OUTPATIENT
Start: 2022-07-25

## 2022-07-25 NOTE — TELEPHONE ENCOUNTER
----- Message from Saundra Apodaca sent at 7/25/2022  9:47 AM EDT -----  Subject: Refill Request    QUESTIONS  Name of Medication? losartan (COZAAR) 100 mg tablet  Patient-reported dosage and instructions? TAKE 1 TABLET BY MOUTH ONCE   DAILY FOR BLOOD PRESSURE  How many days do you have left? 0  Preferred Pharmacy? [id number]  Pharmacy phone number (if available)? [phone]  ---------------------------------------------------------------------------  --------------  Serafin Hinders INFO  What is the best way for the office to contact you? OK to leave message on   voicemail  Preferred Call Back Phone Number? [phone]  ---------------------------------------------------------------------------  --------------  SCRIPT ANSWERS  Relationship to Patient?  Self

## 2022-08-01 ENCOUNTER — OFFICE VISIT (OUTPATIENT)
Dept: URGENT CARE | Age: 52
End: 2022-08-01

## 2022-08-01 VITALS
TEMPERATURE: 98.3 F | HEART RATE: 85 BPM | HEIGHT: 73 IN | OXYGEN SATURATION: 97 % | SYSTOLIC BLOOD PRESSURE: 125 MMHG | BODY MASS INDEX: 29.16 KG/M2 | WEIGHT: 220 LBS | RESPIRATION RATE: 16 BRPM | DIASTOLIC BLOOD PRESSURE: 83 MMHG

## 2022-08-01 DIAGNOSIS — S61.219A LACERATION OF MULTIPLE SITES OF RIGHT HAND AND FINGERS, INITIAL ENCOUNTER: Primary | ICD-10-CM

## 2022-08-01 DIAGNOSIS — S69.91XA FINGER INJURY, RIGHT, INITIAL ENCOUNTER: ICD-10-CM

## 2022-08-01 DIAGNOSIS — S61.411A LACERATION OF MULTIPLE SITES OF RIGHT HAND AND FINGERS, INITIAL ENCOUNTER: Primary | ICD-10-CM

## 2022-08-01 PROCEDURE — 12002 RPR S/N/AX/GEN/TRNK2.6-7.5CM: CPT | Performed by: FAMILY MEDICINE

## 2022-08-01 PROCEDURE — 99203 OFFICE O/P NEW LOW 30 MIN: CPT | Performed by: FAMILY MEDICINE

## 2022-08-01 RX ORDER — NAPROXEN 500 MG/1
500 TABLET ORAL
Qty: 30 TABLET | Refills: 0 | Status: SHIPPED | OUTPATIENT
Start: 2022-08-01 | End: 2022-09-30 | Stop reason: ALTCHOICE

## 2022-08-01 RX ORDER — CEPHALEXIN 500 MG/1
500 CAPSULE ORAL 3 TIMES DAILY
Qty: 21 CAPSULE | Refills: 0 | Status: SHIPPED | OUTPATIENT
Start: 2022-08-01 | End: 2022-08-08

## 2022-08-01 NOTE — PATIENT INSTRUCTIONS
Avoid getting wet for 24 hours, then may wash and pat dry  Allow to air dry at home  Keep in splint for 4 days  Start Keflex to avoid infection  Return to clinic for suture removal in 10 days  Return to clinic sooner if redness, swelling, drainage for concern of infection

## 2022-08-01 NOTE — PROGRESS NOTES
Broderick Hughes is a 49 y.o. male who presents with multiple lacerations to right 5th finger after getting finger caught in 2 gears while at work. Pt reports gears new and clean. Able to move finger. Last tetanus within the past 5 years per patient. The history is provided by the patient. Past Medical History:   Diagnosis Date    Abnormal liver enzymes     neg hep b , neg hepc, iron, mari    Anxiety     Diabetes mellitus (HCC)     GERD (gastroesophageal reflux disease)     Hypertension     Kidney stone     Psychiatric disorder     anxiety    Vitamin B12 deficiency 2/8/2016        Past Surgical History:   Procedure Laterality Date    [facility]    HX [facility]      right eye surgery         Family History   Problem Relation Age of Onset    Cancer Father     Heart Disease Father         Social History     Socioeconomic History    Marital status: SINGLE     Spouse name: Not on file    Number of children: Not on file    Years of education: Not on file    Highest education level: Not on file   Occupational History    Not on file   Tobacco Use    Smoking status: Never    Smokeless tobacco: Current     Types: Snuff    Tobacco comments:     chews tobacco, dips   Substance and Sexual Activity    Alcohol use: Yes     Alcohol/week: 2.5 standard drinks     Types: 3 Cans of beer per week     Comment: 3-4 beers per night    Drug use: No    Sexual activity: Yes     Partners: Female     Birth control/protection: Pill   Other Topics Concern    Not on file   Social History Narrative    Works at [employer], does [employer]. 1 child from an earlier relationship, 6year old daughter.       Social Determinants of Health     Financial Resource Strain: Not on file   Food Insecurity: Not on file   Transportation Needs: Not on file   Physical Activity: Not on file   Stress: Not on file   Social Connections: Not on file   Intimate Partner Violence: Not on file   Housing Stability: Not on file ALLERGIES: Dilaudid [hydromorphone], Hydrochlorothiazide, Lisinopril, and Metformin    Review of Systems   Skin:  Positive for wound. Neurological:  Negative for weakness and numbness. Vitals:    08/01/22 0852   BP: 125/83   Pulse: 85   Resp: 16   Temp: 98.3 °F (36.8 °C)   SpO2: 97%   Weight: 220 lb (99.8 kg)   Height: 6' 1\" (1.854 m)       Physical Exam  Vitals and nursing note reviewed. Constitutional:       General: He is not in acute distress. Appearance: He is well-developed. He is not diaphoretic. Pulmonary:      Effort: Pulmonary effort is normal.   Musculoskeletal:      Right hand: Laceration (5th finger: 3 jagged laceration: 2cm, 2mm deep; 1cm, 2mm deep; 1cm curved 1 mm deep) present. Normal range of motion. Normal strength. Normal sensation. There is no disruption of two-point discrimination. Normal capillary refill. Neurological:      Mental Status: He is alert. Psychiatric:         Behavior: Behavior normal.         Thought Content: Thought content normal.         Judgment: Judgment normal.       MDM    ICD-10-CM ICD-9-CM   1. Laceration of multiple sites of right hand and fingers, initial encounter  S61.411A 884.0    S61.219A    2. Finger injury, right, initial encounter  S69.91XA 959.5       Orders Placed This Encounter    Wound Repair     This order was created via procedure documentation    Wound Repair     This order was created via procedure documentation    Wound Repair     This order was created via procedure documentation    FINGER SPLINT    XR 5TH FINGER RT MIN 2 V     Standing Status:   Future     Number of Occurrences:   1     Standing Expiration Date:   8/2/2023     Order Specific Question:   Reason for Exam     Answer:   caught finger between 2 gears. cephALEXin (Keflex) 500 mg capsule     Sig: Take 1 Capsule by mouth three (3) times daily for 7 days.      Dispense:  21 Capsule     Refill:  0        Avoid getting wet for 24 hours, then may wash and pat dry  Allow to air dry at home  Keep in splint for 4 days  Start Keflex to avoid infection  Return to clinic for suture removal in 10 days  Return to clinic sooner if redness, swelling, drainage for concern of infection     If signs and symptoms become worse the pt is to go to the ER. XR Results (most recent):  Results from Appointment encounter on 08/01/22    XR 5TH FINGER RT MIN 2 V    Narrative  EXAM: XR 5TH FINGER RT MIN 2 V    INDICATION: caught finger between 2 gears. .    COMPARISON: None. FINDINGS: Three views of the right fifth finger demonstrate no fracture or other  acute osseous or articular abnormality. The soft tissues are within normal  limits. Impression  No acute abnormality. Wound Repair    Date/Time: 8/1/2022 2:19 PM  Performed by: attendingPreparation: skin prepped with Shur-Clens  Pre-procedure re-eval: Immediately prior to the procedure, the patient was reevaluated and found suitable for the planned procedure and any planned medications. Location details: right small finger  Wound length:2.5 cm or less  Anesthesia: local infiltration    Anesthesia:  Local Anesthetic: lidocaine 1% without epinephrine  Anesthetic total: 4 mL  Foreign bodies: no foreign bodies  Irrigation solution: saline  Irrigation method: syringe  Debridement: minimal  Skin closure: 5-0 nylon  Number of sutures: 7  Technique: simple and interrupted  Approximation: close  Dressing: pressure dressing and antibiotic ointment  Patient tolerance: patient tolerated the procedure well with no immediate complications  My total time at bedside, performing this procedure was 16-30 minutes. Wound Repair    Date/Time: 8/1/2022 2:21 PM  Performed by: attendingPreparation: skin prepped with Shur-Clens  Pre-procedure re-eval: Immediately prior to the procedure, the patient was reevaluated and found suitable for the planned procedure and any planned medications.   Location details: right small finger  Wound length:2.5 cm or less  Anesthesia: local infiltration    Anesthesia:  Local Anesthetic: lidocaine 1% without epinephrine  Anesthetic total: 2 mL  Foreign bodies: no foreign bodies  Irrigation solution: saline  Irrigation method: jet lavage and syringe  Debridement: none  Skin closure: 5-0 nylon  Number of sutures: 4  Technique: simple and interrupted  Dressing: antibiotic ointment and pressure dressing  Patient tolerance: patient tolerated the procedure well with no immediate complications  My total time at bedside, performing this procedure was 1-15 minutes. Wound Repair    Date/Time: 8/1/2022 2:22 PM  Performed by: attendingPreparation: skin prepped with Shgrady-Clens  Pre-procedure re-eval: Immediately prior to the procedure, the patient was reevaluated and found suitable for the planned procedure and any planned medications. Location details: right small finger  Wound length:2.5 cm or less  Anesthesia: local infiltration    Anesthesia:  Local Anesthetic: lidocaine 1% without epinephrine  Anesthetic total: 1 mL  Foreign bodies: no foreign bodies  Irrigation solution: saline  Irrigation method: syringe and jet lavage  Debridement: none  Skin closure: 5-0 nylon  Number of sutures: 3  Technique: simple and interrupted  Dressing: antibiotic ointment and pressure dressing  Patient tolerance: patient tolerated the procedure well with no immediate complications  My total time at bedside, performing this procedure was 1-15 minutes.

## 2022-08-13 ENCOUNTER — OFFICE VISIT (OUTPATIENT)
Dept: URGENT CARE | Age: 52
End: 2022-08-13
Payer: OTHER MISCELLANEOUS

## 2022-08-13 VITALS
SYSTOLIC BLOOD PRESSURE: 159 MMHG | RESPIRATION RATE: 18 BRPM | DIASTOLIC BLOOD PRESSURE: 97 MMHG | HEART RATE: 69 BPM | OXYGEN SATURATION: 100 % | TEMPERATURE: 98.3 F

## 2022-08-13 DIAGNOSIS — Z48.02 VISIT FOR SUTURE REMOVAL: Primary | ICD-10-CM

## 2022-08-13 PROCEDURE — 99212 OFFICE O/P EST SF 10 MIN: CPT | Performed by: NURSE PRACTITIONER

## 2022-08-13 NOTE — PROGRESS NOTES
HISTORY OF PRESENT Fab Pena is a 49 y.o. male. The patient presents with request for suture removal. He has had good outcome with the healing. He denies pain or concern for infection. He does note one suture is tight and buried. Review of Systems   Constitutional:  Negative for chills, fever and malaise/fatigue. Skin:         Well healed right 5th finger lacerations     Physical Exam  Vitals reviewed. Constitutional:       General: He is not in acute distress. Appearance: He is well-developed. He is not ill-appearing or diaphoretic. Skin:     General: Skin is warm. Findings: Laceration present. Comments: Well healed lacerations x3    Neurological:      Mental Status: He is alert. Psychiatric:         Behavior: Behavior is cooperative. ASSESSMENT and PLAN    ICD-10-CM ICD-9-CM    1. Visit for suture removal  Z48.02 V58.32         No orders of the defined types were placed in this encounter. No results found for any visits on 08/13/22. The patients condition was discussed with the patient and they understand. The patient is to follow up with primary care doctor. If signs and symptoms become worse the pt is to go to the ER. The patient is to take medications as prescribed.

## 2022-09-07 ENCOUNTER — TELEPHONE (OUTPATIENT)
Dept: FAMILY MEDICINE CLINIC | Age: 52
End: 2022-09-07

## 2022-09-07 NOTE — TELEPHONE ENCOUNTER
Patient concerned because he was told that he should be taking vitamin B12 with metformin by someone he knows. Patient would like to know what dosage Dr. Judith Isaac would recommend. Patient also c/o burning and stinging sensation in his feet when he is not walking. He noticed this about 1 year ago, but it has gotten worse in the last 2 months. Patient also c/o blurry vision that comes and goes for the past 6-7 months. Patient has not had an eye exam in the last 2 years. Pt has appt on 09/30/22. Will give message to dr. Judith Isaac for advice.

## 2022-09-07 NOTE — TELEPHONE ENCOUNTER
----- Message from [facility] sent at 9/7/2022  9:16 AM EDT -----  Subject: Message to Provider    QUESTIONS  Information for Provider? Please have Dr. Darrius Loja to call regarding B-12   regiment for his blurry vision and numbness in both feet  ---------------------------------------------------------------------------  --------------  Becca Green INFO  [phone]; OK to leave message on voicemail  ---------------------------------------------------------------------------  --------------  SCRIPT ANSWERS  Relationship to Patient?  Self

## 2022-09-21 DIAGNOSIS — F41.9 ANXIETY: ICD-10-CM

## 2022-09-21 RX ORDER — ALPRAZOLAM 1 MG/1
TABLET ORAL
Qty: 60 TABLET | Refills: 1 | Status: SHIPPED | OUTPATIENT
Start: 2022-09-21

## 2022-09-30 ENCOUNTER — OFFICE VISIT (OUTPATIENT)
Dept: FAMILY MEDICINE CLINIC | Age: 52
End: 2022-09-30
Payer: COMMERCIAL

## 2022-09-30 VITALS
HEIGHT: 73 IN | OXYGEN SATURATION: 98 % | SYSTOLIC BLOOD PRESSURE: 108 MMHG | TEMPERATURE: 97.8 F | WEIGHT: 217.2 LBS | RESPIRATION RATE: 16 BRPM | DIASTOLIC BLOOD PRESSURE: 72 MMHG | HEART RATE: 79 BPM | BODY MASS INDEX: 28.79 KG/M2

## 2022-09-30 DIAGNOSIS — Z23 NEEDS FLU SHOT: Primary | ICD-10-CM

## 2022-09-30 DIAGNOSIS — G62.9 NEUROPATHY: ICD-10-CM

## 2022-09-30 DIAGNOSIS — Z00.00 ANNUAL PHYSICAL EXAM: ICD-10-CM

## 2022-09-30 DIAGNOSIS — N18.4 CRF (CHRONIC RENAL FAILURE), STAGE 4 (SEVERE) (HCC): ICD-10-CM

## 2022-09-30 PROBLEM — N18.30 CHRONIC RENAL DISEASE, STAGE III (HCC): Status: ACTIVE | Noted: 2022-09-30

## 2022-09-30 PROCEDURE — 99396 PREV VISIT EST AGE 40-64: CPT | Performed by: FAMILY MEDICINE

## 2022-09-30 PROCEDURE — 90686 IIV4 VACC NO PRSV 0.5 ML IM: CPT | Performed by: FAMILY MEDICINE

## 2022-09-30 PROCEDURE — 90471 IMMUNIZATION ADMIN: CPT | Performed by: FAMILY MEDICINE

## 2022-09-30 RX ORDER — AMITRIPTYLINE HYDROCHLORIDE 50 MG/1
50 TABLET, FILM COATED ORAL
Qty: 30 TABLET | Refills: 5 | Status: SHIPPED | OUTPATIENT
Start: 2022-09-30

## 2022-09-30 NOTE — PROGRESS NOTES
Chief Complaint   Patient presents with    Complete Physical       1. \"Have you been to the ER, urgent care clinic since your last visit? Hospitalized since your last visit? \" Yes right hand laceration     2. \"Have you seen or consulted any other health care providers outside of the [address] [place] [address] since your last visit? \" No     3. For patients aged 39-70: Has the patient had a colonoscopy / FIT/ Cologuard? Yes - no Care Gap present      If the patient is female:    4. For patients aged 41-77: Has the patient had a mammogram within the past 2 years? NA - based on age or sex      11. For patients aged 21-65: Has the patient had a pap smear? NA - based on age or sex    Health Maintenance Due   Topic Date Due    COVID-19 Vaccine (1) Never done    Eye Exam Retinal or Dilated  Never done    Foot Exam Q1  07/11/2020    Shingrix Vaccine Age 50> (1 of 2) Never done    Flu Vaccine (1) 08/01/2022     Verbal Order received from Dr. Claudetta Grant  for flu vaccine given IM in left arm.

## 2022-09-30 NOTE — PATIENT INSTRUCTIONS
Vaccine Information Statement    Influenza (Flu) Vaccine (Inactivated or Recombinant): What You Need to Know    Many vaccine information statements are available in Latvian and other languages. See www.immunize.org/vis. Hojas de información sobre vacunas están disponibles en español y en muchos otros idiomas. Visite www.immunize.org/vis. 1. Why get vaccinated? Influenza vaccine can prevent influenza (flu). Flu is a contagious disease that spreads around the [facility] every year, usually between October and May. Anyone can get the flu, but it is more dangerous for some people. Infants and young children, people 72 years and older, pregnant people, and people with certain health conditions or a weakened immune system are at greatest risk of flu complications. Pneumonia, bronchitis, sinus infections, and ear infections are examples of flu-related complications. If you have a medical condition, such as heart disease, cancer, or diabetes, flu can make it worse. Flu can cause fever and chills, sore throat, muscle aches, fatigue, cough, headache, and runny or stuffy nose. Some people may have vomiting and diarrhea, though this is more common in children than adults. In an average year, thousands of people in the [facility] die from flu, and many more are hospitalized. Flu vaccine prevents millions of illnesses and flu-related visits to the doctor each year. 2. Influenza vaccines     CDC recommends everyone 6 months and older get vaccinated every flu season. Children 6 months through 6years of age may need 2 doses during a single flu season. Everyone else needs only 1 dose each flu season. It takes about 2 weeks for protection to develop after vaccination. There are many flu viruses, and they are always changing. Each year a new flu vaccine is made to protect against the influenza viruses believed to be likely to cause disease in the upcoming flu season.  Even when the vaccine doesnt exactly match these viruses, it may still provide some protection. Influenza vaccine does not cause flu. Influenza vaccine may be given at the same time as other vaccines. 3. Talk with your health care provider    Tell your vaccination provider if the person getting the vaccine:  Has had an allergic reaction after a previous dose of influenza vaccine, or has any severe, life-threatening allergies   Has ever had Guillain-Barré Syndrome (also called GBS)    In some cases, your health care provider may decide to postpone influenza vaccination until a future visit. Influenza vaccine can be administered at any time during pregnancy. People who are or will be pregnant during influenza season should receive inactivated influenza vaccine. People with minor illnesses, such as a cold, may be vaccinated. People who are moderately or severely ill should usually wait until they recover before getting influenza vaccine. Your health care provider can give you more information. 4. Risks of a vaccine reaction    Soreness, redness, and swelling where the shot is given, fever, muscle aches, and headache can happen after influenza vaccination. There may be a very small increased risk of Guillain-Barré Syndrome (GBS) after inactivated influenza vaccine (the flu shot). Carmen Lambert children who get the flu shot along with pneumococcal vaccine (PCV13) and/or DTaP vaccine at the same time might be slightly more likely to have a seizure caused by fever. Tell your health care provider if a child who is getting flu vaccine has ever had a seizure. People sometimes faint after medical procedures, including vaccination. Tell your provider if you feel dizzy or have vision changes or ringing in the ears. As with any medicine, there is a very remote chance of a vaccine causing a severe allergic reaction, other serious injury, or death. 5. What if there is a serious problem?     An allergic reaction could occur after the vaccinated person leaves the clinic. If you see signs of a severe allergic reaction (hives, swelling of the face and throat, difficulty breathing, a fast heartbeat, dizziness, or weakness), call 9-1-1 and get the person to the nearest hospital.    For other signs that concern you, call your health care provider. Adverse reactions should be reported to the Vaccine Adverse Event Reporting System (VAERS). Your health care provider will usually file this report, or you can do it yourself. Visit the VAERS website at www.vaers. [facility].gov or call 1-182.162.1355. VAERS is only for reporting reactions, and VAERS staff members do not give medical advice. 6. The National Vaccine Injury Compensation Program    The [facility] Vaccine Injury Compensation Program (VICP) is a federal program that was created to compensate people who may have been injured by certain vaccines. Claims regarding alleged injury or death due to vaccination have a time limit for filing, which may be as short as two years. Visit the VICP website at www.[facility].gov/vaccinecompensation or call 7-350.707.6115 to learn about the program and about filing a claim. 7. How can I learn more? Ask your health care provider. Call your local or state health department. Visit the website of the Food and Drug Administration (FDA) for vaccine package inserts and additional information at www.fda.gov/vaccines-blood-biologics/vaccines. Contact the Centers for Disease Control and Prevention (CDC): Call 2-495.394.5279 (1-800-CDC-INFO) or  Visit CDCs influenza website at www.cdc.gov/flu. Vaccine Information Statement   Inactivated Influenza Vaccine   8/6/2021  42 DAVID Morris [id number]   Department of Health and Human Services  Centers for Disease Control and Prevention    Office Use Only

## 2022-09-30 NOTE — PROGRESS NOTES
HISTORY OF PRESENT ILLNESS  Nelson Goodman is a 47 y.o. male. HPI In for physical. Needs diabetes, blood pressure and cholesterol checked. Doesn't like metformin. Has bad numbness in feet. Stands on concrete all day. Legs also swell a lot at times. Declines covid vaccinations. Willing to take flu shots. Has about 3 alcoholic drinks/day. Review of Systems   Constitutional:  Positive for malaise/fatigue. Negative for weight loss. HENT:  Negative for hearing loss and tinnitus. Eyes:  Positive for blurred vision. Negative for double vision. Respiratory:  Negative for cough and shortness of breath. Nonsmoker   Cardiovascular:  Negative for chest pain and palpitations. Gastrointestinal:  Positive for nausea. Negative for blood in stool. Genitourinary:  Negative for frequency and hematuria. Musculoskeletal:         Has pains from waist down- hips, knees, feet. Skin:  Negative for itching and rash. Neurological:  Negative for loss of consciousness and weakness. Psychiatric/Behavioral:  The patient is nervous/anxious and has insomnia. Physical Exam  Vitals and nursing note reviewed. Constitutional:       Appearance: He is well-developed. HENT:      Right Ear: External ear normal.      Left Ear: External ear normal.   Neck:      Thyroid: No thyromegaly. Cardiovascular:      Rate and Rhythm: Normal rate and regular rhythm. Pulses: Normal pulses. Heart sounds: Normal heart sounds. Pulmonary:      Effort: Pulmonary effort is normal. No respiratory distress. Breath sounds: Normal breath sounds. No wheezing. Abdominal:      General: Bowel sounds are normal. There is no distension. Palpations: Abdomen is soft. There is no mass. Tenderness: There is no abdominal tenderness. There is no guarding. Musculoskeletal:         General: Normal range of motion. Lymphadenopathy:      Cervical: No cervical adenopathy.    Neurological:      Comments: Sensation in bottom of feet fair. ASSESSMENT and PLAN  Orders Placed This Encounter    Influenza Vaccine. QUAD, PF, SYR (Afluria 21156)    CBC WITH AUTOMATED DIFF    METABOLIC PANEL, COMPREHENSIVE    LIPID PANEL    HEMOGLOBIN A1C WITH EAG    VITAMIN B12    CELIAC ANTIBODY PROFILE    TSH 3RD GENERATION    T4, FREE    ta General Neurology [facility] EM[facility]    amitriptyline (ELAVIL) 50 mg tablet     Diagnoses and all orders for this visit:    1. Needs flu shot  -     INFLUENZA, FLUARIX, FLULAVAL, FLUZONE (AGE 6 MO+), AFLURIA(AGE 3Y+) IM, PF, 0.5 ML    2. Annual physical exam  -     METABOLIC PANEL, COMPREHENSIVE; Future  -     LIPID PANEL; Future    3. Neuropathy  -     CBC WITH AUTOMATED DIFF; Future  -     HEMOGLOBIN A1C WITH EAG; Future  -     VITAMIN B12; Future  -     CELIAC ANTIBODY PROFILE; Future  -     TSH 3RD GENERATION; Future  -     T4, FREE; Future  -     REFERRAL TO NEUROLOGY    4. CRF (chronic renal failure), stage 4 (severe) (HCC)    Other orders  -     amitriptyline (ELAVIL) 50 mg tablet; Take 1 Tablet by mouth nightly. For neuropathy      Follow-up and Dispositions    Return in about 6 months (around 3/30/2023).

## 2022-10-01 LAB
ALBUMIN SERPL-MCNC: 3.8 G/DL (ref 3.5–5)
ALBUMIN/GLOB SERPL: 1.1 {RATIO} (ref 1.1–2.2)
ALP SERPL-CCNC: 74 U/L (ref 45–117)
ALT SERPL-CCNC: 18 U/L (ref 12–78)
ANION GAP SERPL CALC-SCNC: 4 MMOL/L (ref 5–15)
AST SERPL-CCNC: 16 U/L (ref 15–37)
BASOPHILS # BLD: 0 K/UL (ref 0–0.1)
BASOPHILS NFR BLD: 1 % (ref 0–1)
BILIRUB SERPL-MCNC: 0.8 MG/DL (ref 0.2–1)
BUN SERPL-MCNC: 16 MG/DL (ref 6–20)
BUN/CREAT SERPL: 10 (ref 12–20)
CALCIUM SERPL-MCNC: 8.6 MG/DL (ref 8.5–10.1)
CHLORIDE SERPL-SCNC: 108 MMOL/L (ref 97–108)
CHOLEST SERPL-MCNC: 142 MG/DL
CO2 SERPL-SCNC: 29 MMOL/L (ref 21–32)
CREAT SERPL-MCNC: 1.56 MG/DL (ref 0.7–1.3)
DIFFERENTIAL METHOD BLD: ABNORMAL
EOSINOPHIL # BLD: 0.1 K/UL (ref 0–0.4)
EOSINOPHIL NFR BLD: 3 % (ref 0–7)
ERYTHROCYTE [DISTWIDTH] IN BLOOD BY AUTOMATED COUNT: 13.6 % (ref 11.5–14.5)
EST. AVERAGE GLUCOSE BLD GHB EST-MCNC: 137 MG/DL
GLOBULIN SER CALC-MCNC: 3.4 G/DL (ref 2–4)
GLUCOSE SERPL-MCNC: 175 MG/DL (ref 65–100)
HBA1C MFR BLD: 6.4 % (ref 4–5.6)
HCT VFR BLD AUTO: 40.8 % (ref 36.6–50.3)
HDLC SERPL-MCNC: 72 MG/DL
HDLC SERPL: 2 {RATIO} (ref 0–5)
HGB BLD-MCNC: 14.1 G/DL (ref 12.1–17)
IMM GRANULOCYTES # BLD AUTO: 0 K/UL (ref 0–0.04)
IMM GRANULOCYTES NFR BLD AUTO: 0 % (ref 0–0.5)
LDLC SERPL CALC-MCNC: ABNORMAL MG/DL (ref 0–100)
LDLC SERPL DIRECT ASSAY-MCNC: 53 MG/DL (ref 0–100)
LYMPHOCYTES # BLD: 1.7 K/UL (ref 0.8–3.5)
LYMPHOCYTES NFR BLD: 54 % (ref 12–49)
MCH RBC QN AUTO: 34.1 PG (ref 26–34)
MCHC RBC AUTO-ENTMCNC: 34.6 G/DL (ref 30–36.5)
MCV RBC AUTO: 98.6 FL (ref 80–99)
MONOCYTES # BLD: 0.3 K/UL (ref 0–1)
MONOCYTES NFR BLD: 8 % (ref 5–13)
NEUTS SEG # BLD: 1.1 K/UL (ref 1.8–8)
NEUTS SEG NFR BLD: 34 % (ref 32–75)
NRBC # BLD: 0 K/UL (ref 0–0.01)
NRBC BLD-RTO: 0 PER 100 WBC
PLATELET # BLD AUTO: 162 K/UL (ref 150–400)
PMV BLD AUTO: 10.9 FL (ref 8.9–12.9)
POTASSIUM SERPL-SCNC: 4.2 MMOL/L (ref 3.5–5.1)
PROT SERPL-MCNC: 7.2 G/DL (ref 6.4–8.2)
RBC # BLD AUTO: 4.14 M/UL (ref 4.1–5.7)
SODIUM SERPL-SCNC: 141 MMOL/L (ref 136–145)
T4 FREE SERPL-MCNC: 0.8 NG/DL (ref 0.8–1.5)
TRIGL SERPL-MCNC: 526 MG/DL (ref ?–150)
TSH SERPL DL<=0.05 MIU/L-ACNC: 1.22 UIU/ML (ref 0.36–3.74)
VIT B12 SERPL-MCNC: 540 PG/ML (ref 193–986)
VLDLC SERPL CALC-MCNC: ABNORMAL MG/DL
WBC # BLD AUTO: 3.2 K/UL (ref 4.1–11.1)

## 2022-10-05 LAB
GLIADIN PEPTIDE IGA SER-ACNC: 5 UNITS (ref 0–19)
GLIADIN PEPTIDE IGG SER-ACNC: 2 UNITS (ref 0–19)
IGA SERPL-MCNC: 298 MG/DL (ref 90–386)
TTG IGA SER-ACNC: <2 U/ML (ref 0–3)
TTG IGG SER-ACNC: 8 U/ML (ref 0–5)

## 2022-10-10 ENCOUNTER — TELEPHONE (OUTPATIENT)
Dept: FAMILY MEDICINE CLINIC | Age: 52
End: 2022-10-10

## 2022-10-10 DIAGNOSIS — R76.8 ELEVATED ANTI-TISSUE TRANSGLUTAMINASE (TTG) IGA LEVEL: Primary | ICD-10-CM

## 2022-10-10 NOTE — TELEPHONE ENCOUNTER
Pc with ptYesenia Benedict have come by one am for a fasting lipid profile.  Will also refer GI for borderline elevated TTG

## 2022-10-12 ENCOUNTER — OFFICE VISIT (OUTPATIENT)
Dept: FAMILY MEDICINE CLINIC | Age: 52
End: 2022-10-12

## 2022-10-12 DIAGNOSIS — E78.2 ELEVATED TRIGLYCERIDES WITH HIGH CHOLESTEROL: Primary | ICD-10-CM

## 2022-10-12 LAB
CHOLEST SERPL-MCNC: 161 MG/DL
HDLC SERPL-MCNC: 65 MG/DL
HDLC SERPL: 2.5 {RATIO} (ref 0–5)
LDLC SERPL CALC-MCNC: 53.4 MG/DL (ref 0–100)
TRIGL SERPL-MCNC: 213 MG/DL (ref ?–150)
VLDLC SERPL CALC-MCNC: 42.6 MG/DL

## 2022-10-14 ENCOUNTER — TELEPHONE (OUTPATIENT)
Dept: FAMILY MEDICINE CLINIC | Age: 52
End: 2022-10-14

## 2022-10-14 DIAGNOSIS — G62.9 NEUROPATHY: Primary | ICD-10-CM

## 2022-10-14 NOTE — TELEPHONE ENCOUNTER
Patient notes Dr. Rosalia Jorgensen is a vascular surgeon and was recommended by a friend who per patient had exact same symptoms history as patient.   Will put in for referral as requsted by patient for appt on  10/25/22

## 2022-10-14 NOTE — TELEPHONE ENCOUNTER
----- Message from Santos Paige sent at 10/14/2022 10:58 AM EDT -----  Subject: Message to Provider    QUESTIONS  Information for Provider? Patient requesting call back as soon as   possible, patient called a neurologist and they said they needed his   information before they will see him. Dr. Allyssa Huston Neurologist fax number   [phone]. Appointment has been scheduled for October 25, 2022 at 10:30   am with Dr. Allyssa Huston.   ---------------------------------------------------------------------------  --------------  [id number]  [phone]; OK to leave message on voicemail  ---------------------------------------------------------------------------  --------------  SCRIPT ANSWERS  Relationship to Patient?  Self

## 2022-10-16 ENCOUNTER — APPOINTMENT (OUTPATIENT)
Dept: GENERAL RADIOLOGY | Age: 52
End: 2022-10-16
Attending: EMERGENCY MEDICINE
Payer: COMMERCIAL

## 2022-10-16 ENCOUNTER — APPOINTMENT (OUTPATIENT)
Dept: ULTRASOUND IMAGING | Age: 52
End: 2022-10-16
Attending: EMERGENCY MEDICINE
Payer: COMMERCIAL

## 2022-10-16 ENCOUNTER — HOSPITAL ENCOUNTER (EMERGENCY)
Age: 52
Discharge: HOME | End: 2022-10-16
Attending: EMERGENCY MEDICINE
Payer: COMMERCIAL

## 2022-10-16 VITALS
OXYGEN SATURATION: 98 % | RESPIRATION RATE: 16 BRPM | DIASTOLIC BLOOD PRESSURE: 103 MMHG | HEART RATE: 102 BPM | SYSTOLIC BLOOD PRESSURE: 146 MMHG | TEMPERATURE: 97.7 F | HEIGHT: 73 IN | WEIGHT: 210 LBS | BODY MASS INDEX: 27.83 KG/M2

## 2022-10-16 DIAGNOSIS — M79.604 RIGHT LEG PAIN: ICD-10-CM

## 2022-10-16 DIAGNOSIS — M25.461 EFFUSION OF RIGHT KNEE: Primary | ICD-10-CM

## 2022-10-16 LAB
ALBUMIN SERPL-MCNC: 3.6 G/DL (ref 3.5–5)
ALBUMIN/GLOB SERPL: 0.9 {RATIO} (ref 1.1–2.2)
ALP SERPL-CCNC: 66 U/L (ref 45–117)
ALT SERPL-CCNC: 15 U/L (ref 12–78)
ANION GAP SERPL CALC-SCNC: 10 MMOL/L (ref 5–15)
APPEARANCE SNV: ABNORMAL
AST SERPL-CCNC: 11 U/L (ref 15–37)
BASOPHILS # BLD: 0 K/UL (ref 0–0.1)
BASOPHILS NFR BLD: 1 % (ref 0–1)
BILIRUB SERPL-MCNC: 0.9 MG/DL (ref 0.2–1)
BODY FLD TYPE: NORMAL
BUN SERPL-MCNC: 18 MG/DL (ref 6–20)
BUN/CREAT SERPL: 13 (ref 12–20)
CALCIUM SERPL-MCNC: 9.1 MG/DL (ref 8.5–10.1)
CHLORIDE SERPL-SCNC: 102 MMOL/L (ref 97–108)
CO2 SERPL-SCNC: 25 MMOL/L (ref 21–32)
COLOR SNV: ABNORMAL
CREAT SERPL-MCNC: 1.41 MG/DL (ref 0.7–1.3)
CRYSTALS FLD MICRO: NORMAL
DIFFERENTIAL METHOD BLD: ABNORMAL
EOSINOPHIL # BLD: 0 K/UL (ref 0–0.4)
EOSINOPHIL NFR BLD: 0 % (ref 0–7)
ERYTHROCYTE [DISTWIDTH] IN BLOOD BY AUTOMATED COUNT: 13.2 % (ref 11.5–14.5)
GLOBULIN SER CALC-MCNC: 4.1 G/DL (ref 2–4)
GLUCOSE SERPL-MCNC: 159 MG/DL (ref 65–100)
HCT VFR BLD AUTO: 35.6 % (ref 36.6–50.3)
HGB BLD-MCNC: 12.7 G/DL (ref 12.1–17)
IMM GRANULOCYTES # BLD AUTO: 0 K/UL (ref 0–0.04)
IMM GRANULOCYTES NFR BLD AUTO: 0 % (ref 0–0.5)
LYMPHOCYTES # BLD: 1 K/UL (ref 0.8–3.5)
LYMPHOCYTES NFR BLD: 14 % (ref 12–49)
LYMPHOCYTES NFR SNV MANUAL: 4 % (ref 0–74)
MCH RBC QN AUTO: 33.2 PG (ref 26–34)
MCHC RBC AUTO-ENTMCNC: 35.7 G/DL (ref 30–36.5)
MCV RBC AUTO: 93 FL (ref 80–99)
MONOCYTES # BLD: 0.7 K/UL (ref 0–1)
MONOCYTES NFR BLD: 10 % (ref 5–13)
MONOCYTES NFR SNV MANUAL: 7 % (ref 0–69)
NEUTROPHILS NFR SNV MANUAL: 89 % (ref 0–24)
NEUTS SEG # BLD: 5.3 K/UL (ref 1.8–8)
NEUTS SEG NFR BLD: 75 % (ref 32–75)
NRBC # BLD: 0 K/UL (ref 0–0.01)
NRBC BLD-RTO: 0 PER 100 WBC
PLATELET # BLD AUTO: 157 K/UL (ref 150–400)
PMV BLD AUTO: 10.4 FL (ref 8.9–12.9)
POTASSIUM SERPL-SCNC: 3.5 MMOL/L (ref 3.5–5.1)
PROT SERPL-MCNC: 7.7 G/DL (ref 6.4–8.2)
RBC # BLD AUTO: 3.83 M/UL (ref 4.1–5.7)
RBC # SNV: >100 /CU MM
SODIUM SERPL-SCNC: 137 MMOL/L (ref 136–145)
SPECIMEN SOURCE FLD: ABNORMAL
WBC # BLD AUTO: 7 K/UL (ref 4.1–11.1)
WBC # SNV: ABNORMAL /CU MM (ref 0–150)

## 2022-10-16 PROCEDURE — 73560 X-RAY EXAM OF KNEE 1 OR 2: CPT

## 2022-10-16 PROCEDURE — 74011000250 HC RX REV CODE- 250: Performed by: EMERGENCY MEDICINE

## 2022-10-16 PROCEDURE — 99284 EMERGENCY DEPT VISIT MOD MDM: CPT

## 2022-10-16 PROCEDURE — 74011250636 HC RX REV CODE- 250/636: Performed by: EMERGENCY MEDICINE

## 2022-10-16 PROCEDURE — 87205 SMEAR GRAM STAIN: CPT

## 2022-10-16 PROCEDURE — 80053 COMPREHEN METABOLIC PANEL: CPT

## 2022-10-16 PROCEDURE — 96372 THER/PROPH/DIAG INJ SC/IM: CPT

## 2022-10-16 PROCEDURE — 89050 BODY FLUID CELL COUNT: CPT

## 2022-10-16 PROCEDURE — 93971 EXTREMITY STUDY: CPT

## 2022-10-16 PROCEDURE — 74011250637 HC RX REV CODE- 250/637: Performed by: EMERGENCY MEDICINE

## 2022-10-16 PROCEDURE — 85025 COMPLETE CBC W/AUTO DIFF WBC: CPT

## 2022-10-16 PROCEDURE — 36415 COLL VENOUS BLD VENIPUNCTURE: CPT

## 2022-10-16 PROCEDURE — 89060 EXAM SYNOVIAL FLUID CRYSTALS: CPT

## 2022-10-16 RX ORDER — OXYCODONE AND ACETAMINOPHEN 5; 325 MG/1; MG/1
1 TABLET ORAL
Status: COMPLETED | OUTPATIENT
Start: 2022-10-16 | End: 2022-10-16

## 2022-10-16 RX ORDER — LIDOCAINE HYDROCHLORIDE 20 MG/ML
10 INJECTION, SOLUTION EPIDURAL; INFILTRATION; INTRACAUDAL; PERINEURAL ONCE
Status: COMPLETED | OUTPATIENT
Start: 2022-10-16 | End: 2022-10-16

## 2022-10-16 RX ORDER — OXYCODONE AND ACETAMINOPHEN 5; 325 MG/1; MG/1
1 TABLET ORAL
Qty: 10 TABLET | Refills: 0 | Status: SHIPPED | OUTPATIENT
Start: 2022-10-16 | End: 2022-10-19

## 2022-10-16 RX ORDER — ONDANSETRON 4 MG/1
4 TABLET, ORALLY DISINTEGRATING ORAL
Status: COMPLETED | OUTPATIENT
Start: 2022-10-16 | End: 2022-10-16

## 2022-10-16 RX ADMIN — ONDANSETRON 4 MG: 4 TABLET, ORALLY DISINTEGRATING ORAL at 08:48

## 2022-10-16 RX ADMIN — OXYCODONE AND ACETAMINOPHEN 1 TABLET: 5; 325 TABLET ORAL at 11:50

## 2022-10-16 RX ADMIN — OXYCODONE AND ACETAMINOPHEN 1 TABLET: 5; 325 TABLET ORAL at 08:47

## 2022-10-16 RX ADMIN — LIDOCAINE HYDROCHLORIDE 200 MG: 20 INJECTION, SOLUTION INTRAVENOUS at 11:10

## 2022-10-16 NOTE — ED PROVIDER NOTES
EMERGENCY DEPARTMENT HISTORY AND PHYSICAL EXAM      Date: 10/16/2022  Patient Name: Aditi Vogt    History of Presenting Illness     Chief Complaint   Patient presents with    Knee Pain     Pt via wheelchair into triage with cc of atraumatic right knee pain x2-3days, radiates into hip/thigh. Pt notes swelling. Pt also notes nausea beginning today. Pt has had on going atraumatic pain of this knee intermittently for several years       History Provided By: Patient    HPI: Aditi Vogt, 47 y.o. male presents to the ED with cc of right knee and thigh pain. The patient's symptoms started 2 to 3 days ago. Denies any known trauma, but states he was on a boat last week, maybe he hit his knee on something. Pain initially was a 4 out of 10 in severity, but is now 10 out of 10 in severity. He had some extra Toradol from a prior kidney stone, and took that last night. He says initially it did help, but when he took the second dose 6 hours later, he had no relief of symptoms. Denies fever, chills, cough, chest pain. He says he feels some shortness of breath but he believes it may be due to the intensity of the pain. Denies any new numbness or tingling. He says he has chronic numbness of his feet and has had numbness periorally in his hands for 6 months. He is a diabetic, but his hemoglobin A1c was recently 6.2, so he is not on metformin at this time. His doctor sending him to vascular surgeon for further evaluation of his numbness. He has chronic low back pain. He denies lightheadedness or dysuria. Denies cough. There are no other complaints, changes, or physical findings at this time. PCP: Scott Dugan MD    No current facility-administered medications on file prior to encounter. Current Outpatient Medications on File Prior to Encounter   Medication Sig Dispense Refill    amitriptyline (ELAVIL) 50 mg tablet Take 1 Tablet by mouth nightly.  For neuropathy 30 Tablet 5    ALPRAZolam (XANAX) 1 mg tablet TAKE 1 TABLET BY MOUTH TWICE DAILY AS NEEDED FOR STRESS 60 Tablet 1    losartan (COZAAR) 100 mg tablet TAKE 1 TABLET BY MOUTH ONCE DAILY FOR BLOOD PRESSURE 90 Tablet 2    atorvastatin (LIPITOR) 20 mg tablet TAKE 1 TABLET BY MOUTH EVERY DAY 90 Tablet 3    tamsulosin (FLOMAX) 0.4 mg capsule TAKE 1 CAPSULE BY MOUTH DAILY 90 Capsule 3    metFORMIN (GLUCOPHAGE) 500 mg tablet TAKE 1 TABLET BY MOUTH TWICE DAILY WITH MEALS FOR DIABETES (Patient not taking: Reported on 9/30/2022) 180 Tablet 3       Past History     Past Medical History:  Past Medical History:   Diagnosis Date    Abnormal liver enzymes     neg hep b , neg hepc, iron, mari    Anxiety     Diabetes mellitus (HCC)     GERD (gastroesophageal reflux disease)     Hypertension     Kidney stone     Psychiatric disorder     anxiety    Vitamin B12 deficiency 2/8/2016       Past Surgical History:  Past Surgical History:   Procedure Laterality Date    HX CHOLECYSTECTOMY  10/19/2013    [facility]-Dr. Isabel Rose    HX COLONOSCOPY  2022    Dr. Evelyn CAMPA      right eye surgery       Family History:  Family History   Problem Relation Age of Onset    Cancer Father     Heart Disease Father        Social History:  Social History     Tobacco Use    Smoking status: Never    Smokeless tobacco: Current     Types: Snuff    Tobacco comments:     chews tobacco, dips   Substance Use Topics    Alcohol use: Yes     Alcohol/week: 2.5 standard drinks     Types: 3 Cans of beer per week     Comment: 3-4 beers per night    Drug use: No       Allergies: Allergies   Allergen Reactions    Dilaudid [Hydromorphone] Hives    Hydrochlorothiazide Other (comments)     Hyperuricemia/gout    Lisinopril Diarrhea     Patient is taking this medication     Metformin Itching     Patient is taking this medication          Review of Systems   Review of Systems   Constitutional:  Negative for fever. HENT:  Negative for congestion. Eyes: Negative. Respiratory:  Positive for shortness of breath. Cardiovascular:  Negative for chest pain. Gastrointestinal:  Negative for abdominal pain. Endocrine: Negative for heat intolerance. Genitourinary: Negative. Musculoskeletal:  Positive for back pain. Skin:  Negative for rash. Allergic/Immunologic: Negative for immunocompromised state. Neurological:  Positive for numbness. Negative for dizziness. Hematological:  Does not bruise/bleed easily. Psychiatric/Behavioral: Negative. All other systems reviewed and are negative. Physical Exam   Physical Exam  Vitals and nursing note reviewed. Constitutional:       General: He is not in acute distress. Appearance: He is well-developed. HENT:      Head: Normocephalic and atraumatic. Cardiovascular:      Rate and Rhythm: Normal rate and regular rhythm. Pulses: Normal pulses. Heart sounds: Normal heart sounds. Comments: 2+ distal pulses  Pulmonary:      Effort: Pulmonary effort is normal.      Breath sounds: Normal breath sounds. Abdominal:      General: Bowel sounds are normal.      Palpations: Abdomen is soft. Musculoskeletal:         General: Swelling and tenderness present. Cervical back: Normal range of motion. Right lower leg: Edema present. Comments: Decreased range of motion right knee joint, tenderness right knee greater than right thigh, edema right knee, trace edema right leg   Skin:     General: Skin is warm and dry. Neurological:      General: No focal deficit present. Mental Status: He is alert and oriented to person, place, and time.       Coordination: Coordination normal.   Psychiatric:         Mood and Affect: Mood normal.         Behavior: Behavior normal.       Diagnostic Study Results     Labs -     Recent Results (from the past 12 hour(s))   CBC WITH AUTOMATED DIFF    Collection Time: 10/16/22 10:07 AM   Result Value Ref Range    WBC 7.0 4.1 - 11.1 K/uL    RBC 3.83 (L) 4.10 - 5.70 M/uL    HGB 12.7 12.1 - 17.0 g/dL    HCT 35.6 (L) 36.6 - 50.3 %    MCV 93.0 80.0 - 99.0 FL    MCH 33.2 26.0 - 34.0 PG    MCHC 35.7 30.0 - 36.5 g/dL    RDW 13.2 11.5 - 14.5 %    PLATELET 099 232 - 998 K/uL    MPV 10.4 8.9 - 12.9 FL    NRBC 0.0 0  WBC    ABSOLUTE NRBC 0.00 0.00 - 0.01 K/uL    NEUTROPHILS 75 32 - 75 %    LYMPHOCYTES 14 12 - 49 %    MONOCYTES 10 5 - 13 %    EOSINOPHILS 0 0 - 7 %    BASOPHILS 1 0 - 1 %    IMMATURE GRANULOCYTES 0 0.0 - 0.5 %    ABS. NEUTROPHILS 5.3 1.8 - 8.0 K/UL    ABS. LYMPHOCYTES 1.0 0.8 - 3.5 K/UL    ABS. MONOCYTES 0.7 0.0 - 1.0 K/UL    ABS. EOSINOPHILS 0.0 0.0 - 0.4 K/UL    ABS. BASOPHILS 0.0 0.0 - 0.1 K/UL    ABS. IMM. GRANS. 0.0 0.00 - 0.04 K/UL    DF AUTOMATED     METABOLIC PANEL, COMPREHENSIVE    Collection Time: 10/16/22 10:07 AM   Result Value Ref Range    Sodium 137 136 - 145 mmol/L    Potassium 3.5 3.5 - 5.1 mmol/L    Chloride 102 97 - 108 mmol/L    CO2 25 21 - 32 mmol/L    Anion gap 10 5 - 15 mmol/L    Glucose 159 (H) 65 - 100 mg/dL    BUN 18 6 - 20 MG/DL    Creatinine 1.41 (H) 0.70 - 1.30 MG/DL    BUN/Creatinine ratio 13 12 - 20      eGFR >60 >60 ml/min/1.73m2    Calcium 9.1 8.5 - 10.1 MG/DL    Bilirubin, total 0.9 0.2 - 1.0 MG/DL    ALT (SGPT) 15 12 - 78 U/L    AST (SGOT) 11 (L) 15 - 37 U/L    Alk. phosphatase 66 45 - 117 U/L    Protein, total 7.7 6.4 - 8.2 g/dL    Albumin 3.6 3.5 - 5.0 g/dL    Globulin 4.1 (H) 2.0 - 4.0 g/dL    A-G Ratio 0.9 (L) 1.1 - 2.2     CELL COUNT, SYNOVIAL    Collection Time: 10/16/22 11:09 AM   Result Value Ref Range    SYNOVIAL FLD SITE RIGHT KNEE      SYN FLUID COLOR DARK YELLOW      SYN FLUID APPEARANCE TURBID      SYNOVIAL FLD RBC CT >100 (H) 0 /cu mm    SYNOVIAL FLD WBC CT 13,690 (H) 0 - 150 /cu mm       Radiologic Studies -   DUPLEX LOWER EXT VENOUS RIGHT   Final Result      XR KNEE RT MAX 2 VWS   Final Result        CT Results  (Last 48 hours)      None          CXR Results  (Last 48 hours)      None            Medical Decision Making   I am the first provider for this patient.     I reviewed the vital signs, available nursing notes, past medical history, past surgical history, family history and social history. Vital Signs-Reviewed the patient's vital signs. Patient Vitals for the past 12 hrs:   Temp Pulse Resp BP SpO2   10/16/22 0730 97.7 °F (36.5 °C) (!) 102 16 (!) 146/103 98 %       Records Reviewed: Nursing Notes and Old Medical Records    Provider Notes (Medical Decision Making):   Sprain, contusion, effusion, DVT, Baker's cyst, sciatica    ED Course:   Initial assessment performed. The patients presenting problems have been discussed, and they are in agreement with the care plan formulated and outlined with them. I have encouraged them to ask questions as they arise throughout their visit. PROCEDURES  Arthrocentesis: The right knee was cleansed with Betadine/alcohol. Using sterile technique a 22-gauge needle was introduced into the joint, to inject 10 cc of lidocaine 2%. I obtained 73 mL of yellow to bloody knee fluid. Patient tolerated the procedure well. The procedure lasted from 0 to 15 minutes. The fluid has been sent for analysis. Critical Care Time:   0      Disposition:  home    DISCHARGE PLAN:  1. Discharge Medication List as of 10/16/2022 12:31 PM        START taking these medications    Details   oxyCODONE-acetaminophen (Percocet) 5-325 mg per tablet Take 1 Tablet by mouth every eight (8) hours as needed for Pain for up to 3 days. Max Daily Amount: 3 Tablets. Indications: pain, Normal, Disp-10 Tablet, R-0           CONTINUE these medications which have NOT CHANGED    Details   amitriptyline (ELAVIL) 50 mg tablet Take 1 Tablet by mouth nightly.  For neuropathy, Normal, Disp-30 Tablet, R-5      ALPRAZolam (XANAX) 1 mg tablet TAKE 1 TABLET BY MOUTH TWICE DAILY AS NEEDED FOR STRESS, Normal, Disp-60 Tablet, R-1      losartan (COZAAR) 100 mg tablet TAKE 1 TABLET BY MOUTH ONCE DAILY FOR BLOOD PRESSURE, Normal, Disp-90 Tablet, R-2      atorvastatin (LIPITOR) 20 mg tablet TAKE 1 TABLET BY MOUTH EVERY DAY, Normal, Disp-90 Tablet, R-3ZERO refills remain on this prescription. Your patient is requesting advance approval of refills for this medication to PREVENT ANY MISSED DOSES      tamsulosin (FLOMAX) 0.4 mg capsule TAKE 1 CAPSULE BY MOUTH DAILY, Normal, Disp-90 Capsule, R-3      metFORMIN (GLUCOPHAGE) 500 mg tablet TAKE 1 TABLET BY MOUTH TWICE DAILY WITH MEALS FOR DIABETES, Normal, Disp-180 Tablet, R-3           2. Follow-up Information       Follow up With Specialties Details Why Contact Info    Alvarez Mcgrath MD Family Medicine  As needed [address]  [phone]      [facility] EMERGENCY DEPT Emergency Medicine  If symptoms worsen [address]  [address] [facility]  [phone]          3. Return to ED if worse     Diagnosis     Clinical Impression:   1. Effusion of right knee    2. Right leg pain        Attestations:    Reggie Green MD        Please note that this dictation was completed with Touch[employer], the computer voice recognition software. Quite often unanticipated grammatical, syntax, homophones, and other interpretive errors are inadvertently transcribed by the computer software. Please disregard these errors. Please excuse any errors that have escaped final proofreading. Thank you.

## 2022-10-16 NOTE — Clinical Note
[address]  [facility] EMERGENCY DEPT  [address] [id number] [phone]    Work/School Note    Date: 10/16/2022    To Whom It May concern:      Luis Enrique Caraballo was seen and treated today in the emergency room by the following provider(s):  Attending Provider: Robin Hall MD.      Luis Enrique Caraballo is excused from work/school on 10/16/22. He is clear to return to work/school on 10/17/22.         Sincerely,          Holly Bassett MD

## 2022-10-16 NOTE — Clinical Note
[address]  [facility] EMERGENCY DEPT  [address] [address]  [phone]    Work/School Note    Date: 10/16/2022    To Whom It May concern:    Natanael Pruett was seen and treated today in the emergency room by the following provider(s):  Attending Provider: Quinton Hardy MD.      Natanael Pruett is excused from work/school on 10/16/22 and 10/17/22. He is medically clear to return to work/school on 10/18/2022.        Sincerely,          Carlyle Sharpe MD

## 2022-10-16 NOTE — ED NOTES
Went over the patients dc papers, patients and his mom verbalized understanding of the dc papers.   Patient was transported out of the ed via wheelchair

## 2022-10-16 NOTE — ED NOTES
Pt has multiple complaints starting with left knee pain, blurred vision, tingling in his hands and around his mouth, back pain and nausea. Patient states the numbness has been going on for about 6 months.

## 2022-10-17 ENCOUNTER — TELEPHONE (OUTPATIENT)
Dept: FAMILY MEDICINE CLINIC | Age: 52
End: 2022-10-17

## 2022-10-17 NOTE — TELEPHONE ENCOUNTER
----- Message from Anisa Beavers sent at 10/17/2022  9:25 AM EDT -----  Subject: Message to Provider    QUESTIONS  Information for Provider? Kash Mathews would like a return call. He was   seen in the emergency room yesterday morning. They found particles in his   blood work for gout. He would like a prescription as well. Please give him   a call back as soon as possible.  ---------------------------------------------------------------------------  --------------  Amor WiOffer INFO  [phone]; OK to leave message on voicemail  ---------------------------------------------------------------------------  --------------  SCRIPT ANSWERS  Relationship to Patient?  Self

## 2022-10-18 ENCOUNTER — TELEPHONE (OUTPATIENT)
Dept: FAMILY MEDICINE CLINIC | Age: 52
End: 2022-10-18

## 2022-10-18 RX ORDER — PREDNISONE 20 MG/1
20 TABLET ORAL 2 TIMES DAILY
Qty: 10 TABLET | Refills: 0 | Status: SHIPPED | OUTPATIENT
Start: 2022-10-18

## 2022-10-18 NOTE — TELEPHONE ENCOUNTER
----- Message from Abhishek Park sent at 10/18/2022  1:49 PM EDT -----  Subject: Message to Provider    QUESTIONS  Information for Provider? Pt. would like a call back from Dr. Jacqueline Gordon or   the nurse and states he had an ER visit on 10/17/0222 and that his right   knee was drained and particles were found, swelling has reoccurred-but   hasn't worsened and he is in excruciating pain, is on crutches. Pt. didn't   want to schedule a follow up appt. , just want a call back from Dr. Jacqueline Gordon   or the nurse. Pt. also thinks he needs an MRI.   ---------------------------------------------------------------------------  --------------  Mikaela STEWART  [phone]; OK to leave message on voicemail  ---------------------------------------------------------------------------  --------------  SCRIPT ANSWERS  Relationship to Patient?  Self

## 2022-10-20 ENCOUNTER — OFFICE VISIT (OUTPATIENT)
Dept: ORTHOPEDIC SURGERY | Age: 52
End: 2022-10-20
Payer: COMMERCIAL

## 2022-10-20 VITALS — HEIGHT: 73 IN | BODY MASS INDEX: 27.7 KG/M2 | WEIGHT: 209 LBS

## 2022-10-20 DIAGNOSIS — S83.241A ACUTE MEDIAL MENISCUS TEAR OF RIGHT KNEE, INITIAL ENCOUNTER: Primary | ICD-10-CM

## 2022-10-20 DIAGNOSIS — M25.461 EFFUSION OF RIGHT KNEE: ICD-10-CM

## 2022-10-20 LAB
BACTERIA SPEC CULT: NORMAL
GRAM STN SPEC: NORMAL
GRAM STN SPEC: NORMAL
SERVICE CMNT-IMP: NORMAL

## 2022-10-20 PROCEDURE — 20610 DRAIN/INJ JOINT/BURSA W/O US: CPT | Performed by: ORTHOPAEDIC SURGERY

## 2022-10-20 PROCEDURE — 99214 OFFICE O/P EST MOD 30 MIN: CPT | Performed by: ORTHOPAEDIC SURGERY

## 2022-10-20 RX ORDER — TRIAMCINOLONE ACETONIDE 40 MG/ML
40 INJECTION, SUSPENSION INTRA-ARTICULAR; INTRAMUSCULAR ONCE
Status: COMPLETED | OUTPATIENT
Start: 2022-10-20 | End: 2022-10-20

## 2022-10-20 RX ORDER — BUPIVACAINE HYDROCHLORIDE 7.5 MG/ML
3 INJECTION, SOLUTION EPIDURAL; RETROBULBAR ONCE
Status: COMPLETED | OUTPATIENT
Start: 2022-10-20 | End: 2022-10-20

## 2022-10-20 RX ADMIN — BUPIVACAINE HYDROCHLORIDE 22.5 MG: 7.5 INJECTION, SOLUTION EPIDURAL; RETROBULBAR at 09:57

## 2022-10-20 RX ADMIN — TRIAMCINOLONE ACETONIDE 40 MG: 40 INJECTION, SUSPENSION INTRA-ARTICULAR; INTRAMUSCULAR at 09:57

## 2022-10-20 NOTE — TELEPHONE ENCOUNTER
Patient being seen at this time with orthopedic  - Dr. Marleny Harrell. Will defer to ortho direction.

## 2022-10-20 NOTE — LETTER
10/20/2022    Patient: Hina Crisostomo   YOB: 1970   Date of Visit: 10/20/2022     Ed Waggoner MD  [address] [place] [address]  [place]    Dear Ed Waggoner MD,      Thank you for referring Mr. Marcela Ravi to [facility] for evaluation. My notes for this consultation are attached. If you have questions, please do not hesitate to call me. I look forward to following your patient along with you.       Sincerely,    Cirilo Hernandez, DO

## 2022-10-20 NOTE — PROGRESS NOTES
Den Hanson (: 1970) is a 47 y.o. male, established patient, here for evaluation of the following chief complaint(s):  Knee Pain (Right knee swelling and pain)       ASSESSMENT/PLAN:  Below is the assessment and plan developed based on review of pertinent history, physical exam, labs, studies, and medications. Findings were discussed with the patient today. We will obtain an MRI which will help us with further treatment planning including the possibility of surgical treatment. Patient will follow up after this MRI is performed. He elected to try aspiration and injection of the knee as well. He also would like to try brace for as needed. We discussed the risks and benefits of injection and informed consent was obtained. After a sterile preparation, 40 cc of serous fluid was aspirated from the right knee and then 4 cc of bupivicaine and 40 mg of Kenalog were injected into the right knee. The patient tolerated the procedure well and there were no complications. Post injection pain, blood sugar elevation, skin discoloration, fatty atrophy and the signs of infection were discussed in detail. The patient was instructed to contact us if there were any questions or concerns prior to their follow up appointment. 1. Acute medial meniscus tear of right knee, initial encounter  -     triamcinolone acetonide (KENALOG-40) 40 mg/mL injection 40 mg; 40 mg, Intra artICUlar, ONCE, 1 dose, On Thu 10/20/22 at 1000  -     bupivacaine (PF) (MARCAINE) 0.75 % (7.5 mg/mL) injection 22.5 mg; 22.5 mg (3 mL), Intra artICUlar, ONCE, 1 dose, On Thu 10/20/22 at 1000  2.  Effusion of right knee  -     triamcinolone acetonide (KENALOG-40) 40 mg/mL injection 40 mg; 40 mg, Intra artICUlar, ONCE, 1 dose, On Thu 10/20/22 at 1000  -     bupivacaine (PF) (MARCAINE) 0.75 % (7.5 mg/mL) injection 22.5 mg; 22.5 mg (3 mL), Intra artICUlar, ONCE, 1 dose, On Thu 10/20/22 at 1000      Return if symptoms worsen or fail to improve, for imaging results after study performed. SUBJECTIVE/OBJECTIVE:  Lissett Mcdonald (: 1970) is a 47 y.o. male. He notes sharp aching pain and swelling that has been ongoing in the knee for weeks to months. He denies any specific injury but has been battling the symptoms with anti-inflammatories. He has had minimal relief. He notes sharp pains with pivoting type movements and occasional buckling symptoms. He was told in the past that he had a small meniscus tear. He feels that this meniscus tear is likely gotten bigger. He had a recent aspiration performed in the ER which provided minimal relief. No signs of infection or gout per the patient. Allergies   Allergen Reactions    Dilaudid [Hydromorphone] Hives    Hydrochlorothiazide Other (comments)     Hyperuricemia/gout    Lisinopril Diarrhea     Patient is taking this medication     Metformin Itching     Patient is taking this medication        Current Outpatient Medications   Medication Sig    predniSONE (DELTASONE) 20 mg tablet Take 1 Tablet by mouth two (2) times a day. amitriptyline (ELAVIL) 50 mg tablet Take 1 Tablet by mouth nightly.  For neuropathy    ALPRAZolam (XANAX) 1 mg tablet TAKE 1 TABLET BY MOUTH TWICE DAILY AS NEEDED FOR STRESS    losartan (COZAAR) 100 mg tablet TAKE 1 TABLET BY MOUTH ONCE DAILY FOR BLOOD PRESSURE    atorvastatin (LIPITOR) 20 mg tablet TAKE 1 TABLET BY MOUTH EVERY DAY    tamsulosin (FLOMAX) 0.4 mg capsule TAKE 1 CAPSULE BY MOUTH DAILY     Current Facility-Administered Medications   Medication    triamcinolone acetonide (KENALOG-40) 40 mg/mL injection 40 mg    bupivacaine (PF) (MARCAINE) 0.75 % (7.5 mg/mL) injection 22.5 mg       Social History     Socioeconomic History    Marital status: SINGLE     Spouse name: Not on file    Number of children: Not on file    Years of education: Not on file    Highest education level: Not on file   Occupational History    Not on file   Tobacco Use    Smoking status: Never Smokeless tobacco: Current     Types: Snuff    Tobacco comments:     chews tobacco, dips   Substance and Sexual Activity    Alcohol use: Yes     Alcohol/week: 2.5 standard drinks     Types: 3 Cans of beer per week     Comment: 3-4 beers per night    Drug use: No    Sexual activity: Yes     Partners: Female     Birth control/protection: Pill   Other Topics Concern    Not on file   Social History Narrative    Works at [employer], does [employer]. 1 child from an earlier relationship, 6year old daughter. Social Determinants of Health     Financial Resource Strain: Unknown    Difficulty of Paying Living Expenses: Patient refused   Food Insecurity: Unknown    Worried About Running Out of Food in the Last Year: Patient refused    Ran Out of Food in the Last Year: Patient refused   Transportation Needs: Not on file   Physical Activity: Not on file   Stress: Not on file   Social Connections: Not on file   Intimate Partner Violence: Not on file   Housing Stability: Not on file       Past Surgical History:   Procedure Laterality Date    HX CHOLECYSTECTOMY  10/19/2013    [facility]-Dr. Babcock Beardipesh    HX COLONOSCOPY  2022    Dr. Adrianna Chapman HEVASQUEZ      right eye surgery       Family History   Problem Relation Age of Onset    Cancer Father     Heart Disease Father                REVIEW OF SYSTEMS:    Patient denies any recent fever, chills, nausea, vomiting, chest pain, or shortness of breath. Vitals:  Ht 6' 1\" (1.854 m)   Wt 209 lb (94.8 kg)   BMI 27.57 kg/m²    Body mass index is 27.57 kg/m². PHYSICAL EXAM:  General exam: Patient is awake, alert, and oriented x3. Well-appearing. No acute distress. Ambulates with an antalgic gait    Right knee: Neurovascular and sensory intact. There is tenderness to palpation along the medial joint line. Mild effusion is present. There is pain with Alfredo's maneuver. No obvious instability on ligamentous testing including Lachman's exam.  Stable anterior and posterior drawer.   No erythema or ecchymosis. IMAGING:    XR Results (most recent):  Results from Hospital Encounter encounter on 10/16/22    XR KNEE RT MAX 2 VWS    Narrative  INDICATION: Pain    FINDINGS: 3 views of the right knee demonstrate no evidence of acute fracture or  dislocation. There is a large knee joint effusion. No other soft tissue  abnormalities are seen. Impression  No evidence of fracture or dislocation. Large knee joint effusion. Results from Appointment encounter on 08/01/22    XR 5TH FINGER RT MIN 2 V    Narrative  EXAM: XR 5TH FINGER RT MIN 2 V    INDICATION: caught finger between 2 gears. .    COMPARISON: None. FINDINGS: Three views of the right fifth finger demonstrate no fracture or other  acute osseous or articular abnormality. The soft tissues are within normal  limits. Impression  No acute abnormality. Results from Appointment encounter on 03/01/22    XR STANDING FOOT RT MIN 3 V    Narrative  Right foot AP, lateral and oblique standing x-rays show no acute abnormalities, fractures or dislocations. There is satisfactory bone density. There is some midfoot arthritis and on lateral view with notable pes planus foot mild to moderate. No soft tissue swelling. Orders Placed This Encounter    triamcinolone acetonide (KENALOG-40) 40 mg/mL injection 40 mg    bupivacaine (PF) (MARCAINE) 0.75 % (7.5 mg/mL) injection 22.5 mg              An electronic signature was used to authenticate this note.   -- Jose Maria Joel DO

## 2022-10-28 ENCOUNTER — OFFICE VISIT (OUTPATIENT)
Dept: ORTHOPEDIC SURGERY | Age: 52
End: 2022-10-28
Payer: COMMERCIAL

## 2022-10-28 VITALS — BODY MASS INDEX: 27.7 KG/M2 | WEIGHT: 209 LBS | HEIGHT: 73 IN

## 2022-10-28 DIAGNOSIS — M25.571 RIGHT ANKLE PAIN, UNSPECIFIED CHRONICITY: Primary | ICD-10-CM

## 2022-10-28 DIAGNOSIS — S83.281A ACUTE LATERAL MENISCAL TEAR, RIGHT, INITIAL ENCOUNTER: ICD-10-CM

## 2022-10-28 DIAGNOSIS — M25.571 ARTHRALGIA OF RIGHT ANKLE: ICD-10-CM

## 2022-10-28 PROCEDURE — 20610 DRAIN/INJ JOINT/BURSA W/O US: CPT | Performed by: ORTHOPAEDIC SURGERY

## 2022-10-28 PROCEDURE — 99214 OFFICE O/P EST MOD 30 MIN: CPT | Performed by: ORTHOPAEDIC SURGERY

## 2022-10-28 RX ORDER — TRIAMCINOLONE ACETONIDE 40 MG/ML
40 INJECTION, SUSPENSION INTRA-ARTICULAR; INTRAMUSCULAR ONCE
Status: COMPLETED | OUTPATIENT
Start: 2022-10-28 | End: 2022-10-28

## 2022-10-28 RX ORDER — BUPIVACAINE HYDROCHLORIDE 7.5 MG/ML
1 INJECTION, SOLUTION EPIDURAL; RETROBULBAR ONCE
Status: COMPLETED | OUTPATIENT
Start: 2022-10-28 | End: 2022-10-28

## 2022-10-28 RX ADMIN — BUPIVACAINE HYDROCHLORIDE 7.5 MG: 7.5 INJECTION, SOLUTION EPIDURAL; RETROBULBAR at 10:44

## 2022-10-28 RX ADMIN — TRIAMCINOLONE ACETONIDE 40 MG: 40 INJECTION, SUSPENSION INTRA-ARTICULAR; INTRAMUSCULAR at 10:44

## 2022-10-28 NOTE — LETTER
10/28/2022    Patient: Allison Chavarria   YOB: 1970   Date of Visit: 10/28/2022     Ishmael Dominguez MD  [address] [place] [id number]  [address] [place]    Dear Ishmael Dominguez MD,      Thank you for referring Mr. Leah Delgado to [facility] for evaluation. My notes for this consultation are attached. If you have questions, please do not hesitate to call me. I look forward to following your patient along with you.       Sincerely,    Alicia Mi, DO

## 2022-10-28 NOTE — PROGRESS NOTES
Allison Chavarria (: 1970) is a 47 y.o. male, established patient, here for evaluation of the following chief complaint(s):  Knee Pain       ASSESSMENT/PLAN:  Below is the assessment and plan developed based on review of pertinent history, physical exam, labs, studies, and medications. We had a long discussion today regarding his right knee. He also notes some right ankle pain and swelling. He notes a history of Lyme's disease which was treated by his primary care physician years ago. He notes that it seemed to have resolved uneventfully in the acute setting. However, he wonders if this could be the cause of his recurrent joint pains and effusions. We will obtain some baseline Lyme's disease labs. I recommended that he follow-up with his primary care regarding further work-up and treatment. However, for today his right knee seems to be doing well. We reviewed his MRI and I recommended against acute surgical intervention until we figure out overall because of general inflammatory symptoms. We did discuss possibility of right knee arthroscopy with partial lateral meniscectomy and chondroplasty if needed in the future. His MCL tear seems to be stable and related to an injury years ago. He elected to try corticosteroid injection for the right ankle today. We discussed the risks and benefits of injection and informed consent was obtained. After a sterile preparation, 1 cc of bupivicaine and 40 mg of Kenalog were injected into the right ankle. The patient tolerated the procedure well and there were no complications. Post injection pain, blood sugar elevation, skin discoloration, fatty atrophy and the signs of infection were discussed in detail. The patient was instructed to contact us if there were any questions or concerns prior to their follow up appointment.     1. Right ankle pain, unspecified chronicity  -     triamcinolone acetonide (KENALOG-40) 40 mg/mL injection 40 mg; 40 mg, Intra artICUlar, ONCE, 1 dose, On Fri 10/28/22 at 1100  -     bupivacaine (PF) (MARCAINE) 0.75 % (7.5 mg/mL) injection 7.5 mg; 7.5 mg (1 mL), Intra artICUlar, ONCE, 1 dose, On Fri 10/28/22 at 1100  -     LYME DISEASE(B.BURGDORFERI)PCR; Future  2. Acute lateral meniscal tear, right, initial encounter  3. Arthralgia of right ankle  -     LYME DISEASE(B.BURGDORFERI)PCR; Future      Return if symptoms worsen or fail to improve. SUBJECTIVE/OBJECTIVE:  Chris Rosales (: 1970) is a 47 y.o. male. He notes that his right knee is feeling much better since previous aspiration and injection. He notes some residual swelling but overall he has returned to ambulation without significant difficulties. He does note some right ankle swelling which has caused some limitations. He notes a history of a tick bite and Lyme's disease years ago. He was treated acutely with antibiotics and notes that his symptoms resolved. However, he does note recurrent joint aches and fatigue over the last number of months. Allergies   Allergen Reactions    Dilaudid [Hydromorphone] Hives    Hydrochlorothiazide Other (comments)     Hyperuricemia/gout    Lisinopril Diarrhea     Patient is taking this medication     Metformin Itching     Patient is taking this medication        Current Outpatient Medications   Medication Sig    predniSONE (DELTASONE) 20 mg tablet Take 1 Tablet by mouth two (2) times a day. amitriptyline (ELAVIL) 50 mg tablet Take 1 Tablet by mouth nightly.  For neuropathy    ALPRAZolam (XANAX) 1 mg tablet TAKE 1 TABLET BY MOUTH TWICE DAILY AS NEEDED FOR STRESS    losartan (COZAAR) 100 mg tablet TAKE 1 TABLET BY MOUTH ONCE DAILY FOR BLOOD PRESSURE    atorvastatin (LIPITOR) 20 mg tablet TAKE 1 TABLET BY MOUTH EVERY DAY    tamsulosin (FLOMAX) 0.4 mg capsule TAKE 1 CAPSULE BY MOUTH DAILY     Current Facility-Administered Medications   Medication    triamcinolone acetonide (KENALOG-40) 40 mg/mL injection 40 mg    bupivacaine (PF) (MARCAINE) 0.75 % (7.5 mg/mL) injection 7.5 mg       Social History     Socioeconomic History    Marital status: SINGLE     Spouse name: Not on file    Number of children: Not on file    Years of education: Not on file    Highest education level: Not on file   Occupational History    Not on file   Tobacco Use    Smoking status: Never    Smokeless tobacco: Current     Types: Snuff    Tobacco comments:     chews tobacco, dips   Substance and Sexual Activity    Alcohol use: Yes     Alcohol/week: 2.5 standard drinks     Types: 3 Cans of beer per week     Comment: 3-4 beers per night    Drug use: No    Sexual activity: Yes     Partners: Female     Birth control/protection: Pill   Other Topics Concern    Not on file   Social History Narrative    Works at [employer], does [employer]. 1 child from an earlier relationship, 6year old daughter. Social Determinants of Health     Financial Resource Strain: Unknown    Difficulty of Paying Living Expenses: Patient refused   Food Insecurity: Unknown    Worried About Running Out of Food in the Last Year: Patient refused    Ran Out of Food in the Last Year: Patient refused   Transportation Needs: Not on file   Physical Activity: Not on file   Stress: Not on file   Social Connections: Not on file   Intimate Partner Violence: Not on file   Housing Stability: Not on file       Past Surgical History:   Procedure Laterality Date    HX CHOLECYSTECTOMY  10/19/2013    [facility]-Dr. Itz Mckenna    HX COLONOSCOPY  2022    Dr. Tammy Murillo HEVASQUEZ      right eye surgery       Family History   Problem Relation Age of Onset    Cancer Father     Heart Disease Father                REVIEW OF SYSTEMS:  ROS    Positive for: Musculoskeletal  Last edited by Neftali Cannon on 10/28/2022  9:44 AM.        Patient denies any recent fever, chills, nausea, vomiting, chest pain, or shortness of breath. Vitals:  Ht 6' 1\" (1.854 m)   Wt 209 lb (94.8 kg)   BMI 27.57 kg/m²    Body mass index is 27.57 kg/m². PHYSICAL EXAM:  General exam: Patient is awake, alert, and oriented x3. Well-appearing. No acute distress. Ambulates with an antalgic gait    Right knee: Improved range of motion. Minimal effusion noted today. Mild pain with Alfredo's exam.  No obvious instability on valgus stress testing. There is some tenderness palpation at the proximal MCL. Right ankle: Mild swelling. No erythema or ecchymosis. Mild pain with range of motion of the ankle. Normal stability    IMAGING:    XR Results (most recent):  Results from Hospital Encounter encounter on 10/16/22    XR KNEE RT MAX 2 VWS    Narrative  INDICATION: Pain    FINDINGS: 3 views of the right knee demonstrate no evidence of acute fracture or  dislocation. There is a large knee joint effusion. No other soft tissue  abnormalities are seen. Impression  No evidence of fracture or dislocation. Large knee joint effusion. Results from Appointment encounter on 08/01/22    XR 5TH FINGER RT MIN 2 V    Narrative  EXAM: XR 5TH FINGER RT MIN 2 V    INDICATION: caught finger between 2 gears. .    COMPARISON: None. FINDINGS: Three views of the right fifth finger demonstrate no fracture or other  acute osseous or articular abnormality. The soft tissues are within normal  limits. Impression  No acute abnormality. Results from Appointment encounter on 03/01/22    XR STANDING FOOT RT MIN 3 V    Narrative  Right foot AP, lateral and oblique standing x-rays show no acute abnormalities, fractures or dislocations. There is satisfactory bone density. There is some midfoot arthritis and on lateral view with notable pes planus foot mild to moderate. No soft tissue swelling.          Orders Placed This Encounter    LYME DISEASE(B.BURGDORFERI)PCR     Standing Status:   Future     Number of Occurrences:   1     Standing Expiration Date:   10/28/2023     Order Specific Question:   Specimen source     Answer:   Blood [2]    triamcinolone acetonide (Padma Sicard) 40 mg/mL injection 40 mg    bupivacaine (PF) (MARCAINE) 0.75 % (7.5 mg/mL) injection 7.5 mg              An electronic signature was used to authenticate this note.   -- Viet Wheat, DO

## 2022-10-31 DIAGNOSIS — M25.461 EFFUSION OF RIGHT KNEE: Primary | ICD-10-CM

## 2022-11-22 DIAGNOSIS — F41.9 ANXIETY: ICD-10-CM

## 2022-11-22 RX ORDER — ALPRAZOLAM 1 MG/1
TABLET ORAL
Qty: 60 TABLET | Refills: 1 | Status: SHIPPED | OUTPATIENT
Start: 2022-11-22

## 2022-11-25 NOTE — TELEPHONE ENCOUNTER
Last Visit: 10/12/22 with MD Gerry Osorio  Next Appointment: Advised to follow-up 3/2023  Previous Refill Encounter(s): 10/18/22 #10    Requested Prescriptions     Pending Prescriptions Disp Refills    predniSONE (DELTASONE) 20 mg tablet 10 Tablet 0     Sig: Take 1 Tablet by mouth two (2) times a day. For [facility] in place:   Recommendation Provided To:    Intervention Detail: New Rx: 1, reason: Patient Preference  Gap Closed?:   Intervention Accepted By:   Time Spent (min): 5

## 2022-11-28 RX ORDER — PREDNISONE 20 MG/1
20 TABLET ORAL 2 TIMES DAILY
Qty: 10 TABLET | Refills: 0 | Status: SHIPPED | OUTPATIENT
Start: 2022-11-28

## 2023-01-30 ENCOUNTER — TELEPHONE (OUTPATIENT)
Dept: FAMILY MEDICINE CLINIC | Age: 53
End: 2023-01-30

## 2023-01-30 NOTE — TELEPHONE ENCOUNTER
Patient given number to Dr. Shipley Fullemma office to check on sooner appt. Has appt 5/13/23 with [facility]. Patient feels he is getting \"fuzzy\".

## 2023-01-30 NOTE — TELEPHONE ENCOUNTER
----- Message from Shala Morrow sent at 1/27/2023  4:19 PM EST -----  Subject: Message to Provider    QUESTIONS  Information for Provider? Patient was referred to a Neurologist but the   soonest he is able to get in is in May. Patients symptoms are getting   worse and would like to get in with someone sooner. Patient is currently   taking amitriptyline (ELAVIL) 50 mg. Please call patient.  ---------------------------------------------------------------------------  --------------  Ubaldo Counter [employer]  [phone]; OK to leave message on voicemail  ---------------------------------------------------------------------------  --------------  SCRIPT ANSWERS  Relationship to Patient?  Self

## 2023-04-25 DIAGNOSIS — F41.9 ANXIETY: ICD-10-CM

## 2023-04-25 RX ORDER — ALPRAZOLAM 1 MG/1
1 TABLET ORAL
Qty: 30 TABLET | Refills: 0 | Status: SHIPPED | OUTPATIENT
Start: 2023-04-25

## 2023-04-25 NOTE — TELEPHONE ENCOUNTER
Ja Todd has an appt on 6/26 with Dr. Jewel Brice. The date was the soonest available. He is currently out of his Xanax and needs it refilled ASAP. Please call if there are any issues refilling and when it has been sent to [place]. He takes it to sleep. Last Visit: 9/30/22 with MD Jewel Brice  Next Appointment: 6/26/23 with MD Jewel Brice  Previous Refill Encounter(s): 1/21/23 #60 with 2 refills    Requested Prescriptions     Pending Prescriptions Disp Refills    ALPRAZolam (XANAX) 1 mg tablet 60 Tablet 0     Sig: Take 1 Tablet by mouth two (2) times daily as needed (stress). Max Daily Amount: 2 mg. For Pharmacy Admin Tracking Only    Program: Medication Refill  CPA in place:   Recommendation Provided To:    Intervention Detail: New Rx: 1, reason: Patient Preference and Scheduled Appointment  Intervention Accepted By:   Chan Dougherty Closed?:   Time Spent (min): 5

## 2023-04-26 NOTE — TELEPHONE ENCOUNTER
Reviewed report generated by the [facility]. Does not demonstrate aberrancies or inconsistencies with regard to the prescribing of controlled medications to this patient by other providers. Orders Placed This Encounter    ALPRAZolam (XANAX) 1 mg tablet     Sig: Take 1 Tablet by mouth two (2) times daily as needed (stress). Max Daily Amount: 2 mg.      Dispense:  30 Tablet     Refill:  0

## 2023-05-16 ENCOUNTER — OFFICE VISIT (OUTPATIENT)
Age: 53
End: 2023-05-16
Payer: COMMERCIAL

## 2023-05-16 VITALS
OXYGEN SATURATION: 99 % | DIASTOLIC BLOOD PRESSURE: 88 MMHG | SYSTOLIC BLOOD PRESSURE: 158 MMHG | HEIGHT: 73 IN | RESPIRATION RATE: 16 BRPM | WEIGHT: 210.8 LBS | HEART RATE: 116 BPM | BODY MASS INDEX: 27.94 KG/M2 | TEMPERATURE: 98 F

## 2023-05-16 DIAGNOSIS — R29.90 MULTIPLE NEUROLOGICAL SYMPTOMS: ICD-10-CM

## 2023-05-16 DIAGNOSIS — R41.3 COMPLAINTS OF MEMORY DISTURBANCE: ICD-10-CM

## 2023-05-16 DIAGNOSIS — M25.50 POLYARTHRALGIA: ICD-10-CM

## 2023-05-16 DIAGNOSIS — R20.2 PARESTHESIA OF BILATERAL LEGS: ICD-10-CM

## 2023-05-16 DIAGNOSIS — R20.2 PARESTHESIA OF BILATERAL LEGS: Primary | ICD-10-CM

## 2023-05-16 DIAGNOSIS — Z87.820 HISTORY OF CLOSED HEAD INJURY: ICD-10-CM

## 2023-05-16 PROCEDURE — 99205 OFFICE O/P NEW HI 60 MIN: CPT | Performed by: PSYCHIATRY & NEUROLOGY

## 2023-05-16 PROCEDURE — 3079F DIAST BP 80-89 MM HG: CPT | Performed by: PSYCHIATRY & NEUROLOGY

## 2023-05-16 PROCEDURE — 3077F SYST BP >= 140 MM HG: CPT | Performed by: PSYCHIATRY & NEUROLOGY

## 2023-05-16 ASSESSMENT — PATIENT HEALTH QUESTIONNAIRE - PHQ9
SUM OF ALL RESPONSES TO PHQ QUESTIONS 1-9: 0
SUM OF ALL RESPONSES TO PHQ QUESTIONS 1-9: 0
SUM OF ALL RESPONSES TO PHQ9 QUESTIONS 1 & 2: 0
2. FEELING DOWN, DEPRESSED OR HOPELESS: 0
1. LITTLE INTEREST OR PLEASURE IN DOING THINGS: 0
SUM OF ALL RESPONSES TO PHQ QUESTIONS 1-9: 0
SUM OF ALL RESPONSES TO PHQ QUESTIONS 1-9: 0

## 2023-05-16 NOTE — ASSESSMENT & PLAN NOTE
We will check blood work and EMGs pending results we may consider skin biopsy to evaluate for small fiber neuropathy    Suspect probably diabetic in nature  Hemoglobin A1C   Date Value Ref Range Status   09/30/2022 6.4 (H) 4.0 - 5.6 % Final     Comment:     NEW METHOD  PLEASE NOTE NEW REFERENCE RANGE  (NOTE)  HbA1C Interpretive Ranges  <5.7              Normal  5.7 - 6.4         Consider Prediabetes  >6.5              Consider Diabetes

## 2023-05-16 NOTE — ASSESSMENT & PLAN NOTE
Patient has a history of closed head injury x2 reasonable to check MRI scan and neuropsych testing  Additionally some blood work has been ordered to evaluate if there is any additional components contributing to cognition issues

## 2023-05-16 NOTE — PROGRESS NOTES
well-nourished in no apparent distress and well groomed. Psych/mental health:  Affect: Extremely anxious but otherwise appropriate    PHQ  No flowsheet data found. HEENT:   Normocephalic  With evidence of trauma: No  Full range of motion head neck: Yes  Tenderness to palpation of the head neck region: No      Cardiovascular:     Extremities warm to touch:Yes  Extremity swelling: No  Discoloration: No  Evidence of PVD: No    Respiratory:   Dyspnea on exertion: No   Abnormal effort on casual observation: No   Use of portable oxygen: No   Evidence of cyanosis: No     Musculoskeletal:   Evidence of significant bone deformities: No   Spinal curvature: No     Integumentary:    Obvious bruising: No   Lacerations or discoloration on casual observation: No       Neurological Examination:   Mental Status:        MMSE  No flowsheet data found. Formal testing was not completed    there was nothing concerning on general observation and discussion.    Alert oriented and appropriate to general conversation  Normal processing on general observation  Followed conversation and responded seemingly appropriate throughout the office visit  No word finding difficulties noted on casual observation  Able to follow directions without difficulty     Cranial Nerves:    Mild convergence spasm right side greater than left  EOMs intact with conjugate movements  No nystagmus is appreciated  Facial motor intact bilaterally  Hearing intact to conversation  Voice with normal projection, no evidence of secretion pooling  Shoulder shrug intact bilaterally  No tongue deviation appreciated     Motor:   Normal bulk and tone  No tremor appreciated on today's exam  No abnormal movements appreciated on today's exam  Moves extremities spontaneously and with purpose  5/5 x 4      Sensation: Intact to light touch; vibration reduced left side to level of ankle    Coordination/Cerebellar:   FTN: Intact bilaterally    Gait: Ambulates independently with

## 2023-05-16 NOTE — PATIENT INSTRUCTIONS
As per discussion  We first need to get your work-up completed to include additional blood work EMG and MRI scan  If you could please get [employer]hart set up I will go over those results briefly with you via Panda[employer] and anything differently we need to do based on those results if you cannot get it set up then we will either reach out to you by phone or letter to review the results    I have also made a referral to Dr. Laura Banks to further evaluate the cognitive concerns  Unfortunately that appointment will probably not be until next year but will get that scheduled for you    It is fine if you want to stop the amitriptyline but if you find your symptoms get worse it was probably doing more for you than you thought it was and go back on it        Office Policies    Phone calls/patient messages:  Please allow up to 24 hours for someone in the office to contact you about your call or message. Be mindful your provider may be out of the office or your message may require further review. We encourage you to use Panda[employer] for your messages as this is a faster, more efficient way to communicate with our office    Medication Refills:  Prescription medications require up to 48 business hours to process. We encourage you to use Panda[employer] for your refills. For controlled medications: Please allow up to 72 business hours to process. Certain medications may require you to  a written prescription at our office. NO narcotic/controlled medications will be prescribed after 4pm Monday through Friday or on weekends    Form/Paperwork Completion:  We ask that you allow 7-14 business days. You may also download your forms to Panda[employer] to have your doctor print off.

## 2023-05-16 NOTE — ASSESSMENT & PLAN NOTE
Both without loss of consciousness but certainly significant may be playing into some of his neurologic symptoms we will check MRI scan neuropsych testing

## 2023-05-16 NOTE — ASSESSMENT & PLAN NOTE
To include intermittent blurred vision, cognitive and memory difficulties, facial numbness and tingling  Checking blood work, EEG, neuropsych testing and MRI of the brain pending results will determine our next course of action

## 2023-05-17 DIAGNOSIS — E55.9 VITAMIN D DEFICIENCY: Primary | ICD-10-CM

## 2023-05-17 LAB
25(OH)D3 SERPL-MCNC: <9 NG/ML (ref 30–100)
COMMENT:: NORMAL
ERYTHROCYTE [SEDIMENTATION RATE] IN BLOOD: 16 MM/HR (ref 0–20)
SPECIMEN HOLD: NORMAL

## 2023-05-17 RX ORDER — ERGOCALCIFEROL 1.25 MG/1
50000 CAPSULE ORAL WEEKLY
Qty: 12 CAPSULE | Refills: 3 | Status: SHIPPED | OUTPATIENT
Start: 2023-05-17

## 2023-05-18 LAB
B BURGDOR IGG PATRN SER IB-IMP: NEGATIVE
B BURGDOR IGM PATRN SER IB-IMP: NEGATIVE
B BURGDOR18KD IGG SER QL IB: ABNORMAL
B BURGDOR23KD IGG SER QL IB: ABNORMAL
B BURGDOR23KD IGM SER QL IB: ABNORMAL
B BURGDOR28KD IGG SER QL IB: ABNORMAL
B BURGDOR30KD IGG SER QL IB: ABNORMAL
B BURGDOR39KD IGG SER QL IB: ABNORMAL
B BURGDOR39KD IGM SER QL IB: ABNORMAL
B BURGDOR41KD IGG SER QL IB: ABNORMAL
B BURGDOR41KD IGM SER QL IB: PRESENT
B BURGDOR45KD IGG SER QL IB: ABNORMAL
B BURGDOR58KD IGG SER QL IB: ABNORMAL
B BURGDOR66KD IGG SER QL IB: ABNORMAL
B BURGDOR93KD IGG SER QL IB: ABNORMAL
CCP IGA+IGG SERPL IA-ACNC: 3 UNITS (ref 0–19)
CENTROMERE B AB SER-ACNC: <0.2 AI (ref 0–0.9)
CHROMATIN AB SERPL-ACNC: <0.2 AI (ref 0–0.9)
DSDNA AB SER-ACNC: <1 IU/ML (ref 0–9)
ENA JO1 AB SER-ACNC: <0.2 AI (ref 0–0.9)
ENA RNP AB SER-ACNC: <0.2 AI (ref 0–0.9)
ENA SCL70 AB SER-ACNC: <0.2 AI (ref 0–0.9)
ENA SM AB SER-ACNC: <0.2 AI (ref 0–0.9)
ENA SM+RNP AB SER-ACNC: <0.2 AI (ref 0–0.9)
ENA SS-A AB SER-ACNC: <0.2 AI (ref 0–0.9)
ENA SS-B AB SER-ACNC: <0.2 AI (ref 0–0.9)
RHEUMATOID FACT SERPL-ACNC: 12.1 IU/ML
RIBOSOMAL P AB SER-ACNC: <0.2 AI (ref 0–0.9)
SEE BELOW:, 164879: NORMAL

## 2023-05-19 DIAGNOSIS — M25.50 POLYARTHRALGIA: Primary | ICD-10-CM

## 2023-05-19 LAB
APTT SCREEN TO CONFIRM RATIO: 1.14 RATIO (ref 0–1.34)
CONFIRM APTT/NORMAL: 41.2 SEC (ref 0–47.6)
DRVVT RATIO: 1.3 RATIO (ref 0.8–1.2)
LA 2 SCREEN W REFLEX-IMP: ABNORMAL
MIXING DRVVT: 42.3 SEC (ref 0–40.4)
SCREEN APTT: 30.4 SEC (ref 0–43.5)
SCREEN DRVVT: 49.1 SEC (ref 0–47)
THROMBIN TIME: 17.4 SEC (ref 0–23)

## 2023-05-19 NOTE — RESULT ENCOUNTER NOTE
Results of the tests were reviewed in MyChart with the patient as follows:     Your rheumatoid arthritic panel is normal you do not have rheumatoid arthritis based on these results

## 2023-05-20 ENCOUNTER — HOSPITAL ENCOUNTER (OUTPATIENT)
Facility: HOSPITAL | Age: 53
Discharge: HOME | End: 2023-05-23

## 2023-05-20 DIAGNOSIS — Z87.820 HISTORY OF CLOSED HEAD INJURY: ICD-10-CM

## 2023-05-20 DIAGNOSIS — F41.9 ANXIETY: Primary | ICD-10-CM

## 2023-05-20 DIAGNOSIS — R29.90 MULTIPLE NEUROLOGICAL SYMPTOMS: ICD-10-CM

## 2023-05-20 LAB
VIT B1 BLD-SCNC: 123.8 NMOL/L (ref 66.5–200)
VIT B6 SERPL-MCNC: 1.4 UG/L (ref 3.4–65.2)

## 2023-05-22 LAB
ALBUMIN SERPL ELPH-MCNC: 4 G/DL (ref 2.9–4.4)
ALBUMIN/GLOB SERPL: 1.3 (ref 0.7–1.7)
ALPHA1 GLOB SERPL ELPH-MCNC: 0.2 G/DL (ref 0–0.4)
ALPHA2 GLOB SERPL ELPH-MCNC: 0.9 G/DL (ref 0.4–1)
B-GLOBULIN SERPL ELPH-MCNC: 1 G/DL (ref 0.7–1.3)
GAMMA GLOB SERPL ELPH-MCNC: 1.1 G/DL (ref 0.4–1.8)
GLOBULIN SER-MCNC: 3.1 G/DL (ref 2.2–3.9)
IGA SERPL-MCNC: 294 MG/DL (ref 90–386)
IGG SERPL-MCNC: 1004 MG/DL (ref 603–1613)
IGM SERPL-MCNC: 103 MG/DL (ref 20–172)
INTERPRETATION SERPL IEP-IMP: NORMAL
M PROTEIN SERPL ELPH-MCNC: NORMAL G/DL
PROT SERPL-MCNC: 7.1 G/DL (ref 6–8.5)

## 2023-05-22 RX ORDER — ALPRAZOLAM 1 MG/1
TABLET ORAL
Qty: 30 TABLET | Refills: 0 | Status: SHIPPED | OUTPATIENT
Start: 2023-05-22 | End: 2023-06-21

## 2023-05-22 NOTE — TELEPHONE ENCOUNTER
Last appointment: 10/12/22  Next appointment: 6/26/23  Previous refill encounter(s): 4/25/23 #30    Requested Prescriptions     Pending Prescriptions Disp Refills    ALPRAZolam (XANAX) 1 MG tablet [Pharmacy Med Name: ALPRAZOLAM 1MG TABLETS] 30 tablet 0     Sig: TAKE 1 TABLET BY MOUTH TWICE DAILY AS NEEDED FOR STRESS. MAX DAILY AMOUNT: 2 MG         For Pharmacy Admin Tracking Only    Program: Medication Refill  CPA in place:    Recommendation Provided To:    Intervention Detail: New Rx: 1, reason: Patient Preference  Intervention Accepted By:   Roselia Ornelas Closed?:    Time Spent (min): 5

## 2023-05-24 ENCOUNTER — TELEPHONE (OUTPATIENT)
Age: 53
End: 2023-05-24

## 2023-05-24 NOTE — TELEPHONE ENCOUNTER
Pt has appt for an emg tomorrow 5/25/23. Has some fluid buildup in his right knee. Wants to know if we can drain some of the fluid to advise what is going on. Advised that it sounded more orthopedic but that I would send a message back to see what we could do for pt.  Please call [phone]

## 2023-05-25 ENCOUNTER — PROCEDURE VISIT (OUTPATIENT)
Age: 53
End: 2023-05-25
Payer: COMMERCIAL

## 2023-05-25 DIAGNOSIS — R20.0 NUMBNESS AND TINGLING OF BOTH LEGS BELOW KNEES: Primary | ICD-10-CM

## 2023-05-25 DIAGNOSIS — M79.10 MYALGIA: ICD-10-CM

## 2023-05-25 DIAGNOSIS — R20.2 NUMBNESS AND TINGLING OF BOTH LEGS BELOW KNEES: Primary | ICD-10-CM

## 2023-05-25 DIAGNOSIS — M79.2 NEURALGIA: ICD-10-CM

## 2023-05-25 DIAGNOSIS — R20.2 PARESTHESIA OF BILATERAL LEGS: ICD-10-CM

## 2023-05-25 PROCEDURE — 95886 MUSC TEST DONE W/N TEST COMP: CPT | Performed by: PSYCHIATRY & NEUROLOGY

## 2023-05-25 PROCEDURE — 95910 NRV CNDJ TEST 7-8 STUDIES: CPT | Performed by: PSYCHIATRY & NEUROLOGY

## 2023-05-25 NOTE — PROGRESS NOTES
ELECTRODIAGNOSTIC REPORT      Test Date:  2023    Patient: Arabella Griffin : 1970 Physician: Dr. Carrero Pair   ID#: [id number] SEX: Male Ref. Phys: Frandy Gaviria     Patient History / Exam:  The patient is a 60-year-old male with intermittent numbness and tingling in his lower extremities. He does have mild swelling. He does have significant swelling in his right knee over the past 24 hours. Strength is 5 out of 5 however pain surrounding his knee limits his ability to sustain a contraction. Sensation was intact. He does have an antalgic gait due to his knee    EMG & NCV Findings:    Nerve conduction studies as listed below in the bilateral lower extremities were within reference of normal.    Disposable concentric needle examination of the muscles listed below in the bilateral lower extremities was normal.    Interpretation: This study was normal.  There was no electrodiagnostic evidence upon todays examination suggesting a focal or generalized large fiber neuropathy, myopathy, or lumbar radiculopathy. ___________________________  Josiah DILLON  FAAN      Nerve Conduction Studies  Anti Sensory Summary Table     Stim Site NR Onset (ms) Peak (ms) O-P Amp (µV) Norm Peak (ms) Norm O-P Amp Site1 Site2 Dist (cm) Norm Constantino (m/s)   Left Sup Fibular Anti Sensory (Lat ankle)  32.4°C   Lower leg    2.3 3.1 11.8 <4.4 >5.0 Lower leg Lat ankle 10.0 >32   Right Sup Fibular Anti Sensory (Lat ankle)  32.4°C   Lower leg    1.9 2.8 13.0 <4.4 >5.0 Lower leg Lat ankle 10.0 >32   Left Sural Anti Sensory (Lat Mall)  32.4°C   Calf    2.5 3.2 13.3 <4.5 >4.0 Calf Lat Mall 14.0    Right Sural Anti Sensory (Lat Mall)  32.4°C   Calf    2.1 3.3 13.7 <4.5 >4.0 Calf Lat Mall 14.0      Motor Summary Table     Stim Site NR Onset (ms) Norm Onset (ms) O-P Amp (mV) Norm O-P Amp P-T Amp (mV) Site1 Site2 Dist (cm) Constantino (m/s)   Left Fibular Motor (Ext Dig Brev)  32.4°C   Ankle    4.4 <6.5 3.1 >2.6  Ankle Ext Dig Brev

## 2023-05-25 NOTE — TELEPHONE ENCOUNTER
Per  verbally stated pt will ok for EMG but might be uncomfortable. Advised him pt stated he will here. I had advised pt I would call back if not able to do the EMG.

## 2023-05-25 NOTE — TELEPHONE ENCOUNTER
Returned call,verified pt with two pt identifiers, advised pt we would not drain any fluid from his knee. He should see Ortho or PCP. Gave pt Ortho on call # [phone]. Advised I can ask  if the EMG will be affected with his knee. If it does then I will call him back. If not he will not hear from me and should keep his appt. Pt advised he will be here. Pt verbalized understanding.

## 2023-05-31 DIAGNOSIS — E53.1 VITAMIN B6 DEFICIENCY: Primary | ICD-10-CM

## 2023-05-31 RX ORDER — PYRIDOXINE HCL (VITAMIN B6) 50 MG
50 TABLET ORAL DAILY
Qty: 90 TABLET | Refills: 0 | Status: SHIPPED | OUTPATIENT
Start: 2023-05-31 | End: 2023-08-29

## 2023-06-21 DIAGNOSIS — F41.9 ANXIETY: ICD-10-CM

## 2023-06-21 RX ORDER — ALPRAZOLAM 1 MG/1
TABLET ORAL
Qty: 30 TABLET | Refills: 0 | OUTPATIENT
Start: 2023-06-21 | End: 2023-07-21

## 2023-06-21 NOTE — TELEPHONE ENCOUNTER
Last appointment: 10/12/22  Next appointment: 6/26/23  Previous refill encounter(s): 5/22/23 #30    Requested Prescriptions     Pending Prescriptions Disp Refills    ALPRAZolam (XANAX) 1 MG tablet 30 tablet 0     Sig: TAKE 1 TABLET BY MOUTH TWICE DAILY AS NEEDED FOR STRESS. MAX DAILY AMOUNT: 2 MG         For Pharmacy Admin Tracking Only    Program: Medication Refill  CPA in place:    Recommendation Provided To:    Intervention Detail: New Rx: 1, reason: Patient Preference  Intervention Accepted By:   Shon Hancock Closed?:    Time Spent (min): 5

## 2023-06-23 DIAGNOSIS — F41.9 ANXIETY: ICD-10-CM

## 2023-06-23 RX ORDER — LOSARTAN POTASSIUM 100 MG/1
TABLET ORAL
Qty: 90 TABLET | Refills: 3 | Status: CANCELLED | OUTPATIENT
Start: 2023-06-23

## 2023-06-23 NOTE — TELEPHONE ENCOUNTER
Patient states that he need a RX refill ALPRAZolam (XANAX) 1 MG tablet losartan (COZAAR) 100 MG tablet   Please give him a call @ [phone]

## 2023-06-26 ENCOUNTER — OFFICE VISIT (OUTPATIENT)
Age: 53
End: 2023-06-26
Payer: COMMERCIAL

## 2023-06-26 VITALS
HEIGHT: 73 IN | SYSTOLIC BLOOD PRESSURE: 109 MMHG | BODY MASS INDEX: 27.54 KG/M2 | RESPIRATION RATE: 20 BRPM | DIASTOLIC BLOOD PRESSURE: 75 MMHG | HEART RATE: 83 BPM | TEMPERATURE: 98 F | OXYGEN SATURATION: 99 % | WEIGHT: 207.8 LBS

## 2023-06-26 DIAGNOSIS — E11.9 TYPE 2 DIABETES MELLITUS WITHOUT COMPLICATION, WITHOUT LONG-TERM CURRENT USE OF INSULIN (HCC): ICD-10-CM

## 2023-06-26 DIAGNOSIS — F43.9 STRESS: Primary | ICD-10-CM

## 2023-06-26 DIAGNOSIS — E78.00 PURE HYPERCHOLESTEROLEMIA, UNSPECIFIED: ICD-10-CM

## 2023-06-26 DIAGNOSIS — I10 ESSENTIAL (PRIMARY) HYPERTENSION: ICD-10-CM

## 2023-06-26 LAB
ALBUMIN SERPL-MCNC: 3.9 G/DL (ref 3.5–5)
ALBUMIN/GLOB SERPL: 1.3 (ref 1.1–2.2)
ALP SERPL-CCNC: 62 U/L (ref 45–117)
ALT SERPL-CCNC: 33 U/L (ref 12–78)
ANION GAP SERPL CALC-SCNC: 5 MMOL/L (ref 5–15)
AST SERPL-CCNC: 18 U/L (ref 15–37)
BASOPHILS # BLD: 0 K/UL (ref 0–0.1)
BASOPHILS NFR BLD: 0 % (ref 0–1)
BILIRUB SERPL-MCNC: 0.7 MG/DL (ref 0.2–1)
BUN SERPL-MCNC: 18 MG/DL (ref 6–20)
BUN/CREAT SERPL: 13 (ref 12–20)
CALCIUM SERPL-MCNC: 9.1 MG/DL (ref 8.5–10.1)
CHLORIDE SERPL-SCNC: 106 MMOL/L (ref 97–108)
CHOLEST SERPL-MCNC: 165 MG/DL
CO2 SERPL-SCNC: 28 MMOL/L (ref 21–32)
CREAT SERPL-MCNC: 1.36 MG/DL (ref 0.7–1.3)
DIFFERENTIAL METHOD BLD: NORMAL
EOSINOPHIL # BLD: 0 K/UL (ref 0–0.4)
EOSINOPHIL NFR BLD: 1 % (ref 0–7)
ERYTHROCYTE [DISTWIDTH] IN BLOOD BY AUTOMATED COUNT: 13.1 % (ref 11.5–14.5)
EST. AVERAGE GLUCOSE BLD GHB EST-MCNC: 154 MG/DL
GLOBULIN SER CALC-MCNC: 3 G/DL (ref 2–4)
GLUCOSE SERPL-MCNC: 166 MG/DL (ref 65–100)
HBA1C MFR BLD: 7 % (ref 4–5.6)
HCT VFR BLD AUTO: 42 % (ref 36.6–50.3)
HDLC SERPL-MCNC: 74 MG/DL
HDLC SERPL: 2.2 (ref 0–5)
HGB BLD-MCNC: 13.7 G/DL (ref 12.1–17)
IMM GRANULOCYTES # BLD AUTO: 0 K/UL (ref 0–0.04)
IMM GRANULOCYTES NFR BLD AUTO: 0 % (ref 0–0.5)
LDLC SERPL CALC-MCNC: 40.4 MG/DL (ref 0–100)
LYMPHOCYTES # BLD: 1.8 K/UL (ref 0.8–3.5)
LYMPHOCYTES NFR BLD: 38 % (ref 12–49)
MCH RBC QN AUTO: 31.6 PG (ref 26–34)
MCHC RBC AUTO-ENTMCNC: 32.6 G/DL (ref 30–36.5)
MCV RBC AUTO: 97 FL (ref 80–99)
MONOCYTES # BLD: 0.4 K/UL (ref 0–1)
MONOCYTES NFR BLD: 7 % (ref 5–13)
NEUTS SEG # BLD: 2.5 K/UL (ref 1.8–8)
NEUTS SEG NFR BLD: 54 % (ref 32–75)
NRBC # BLD: 0 K/UL (ref 0–0.01)
NRBC BLD-RTO: 0 PER 100 WBC
PLATELET # BLD AUTO: 160 K/UL (ref 150–400)
PMV BLD AUTO: 11.4 FL (ref 8.9–12.9)
POTASSIUM SERPL-SCNC: 4.2 MMOL/L (ref 3.5–5.1)
PROT SERPL-MCNC: 6.9 G/DL (ref 6.4–8.2)
RBC # BLD AUTO: 4.33 M/UL (ref 4.1–5.7)
SODIUM SERPL-SCNC: 139 MMOL/L (ref 136–145)
TRIGL SERPL-MCNC: 253 MG/DL
VLDLC SERPL CALC-MCNC: 50.6 MG/DL
WBC # BLD AUTO: 4.7 K/UL (ref 4.1–11.1)

## 2023-06-26 PROCEDURE — 3074F SYST BP LT 130 MM HG: CPT | Performed by: FAMILY MEDICINE

## 2023-06-26 PROCEDURE — 3078F DIAST BP <80 MM HG: CPT | Performed by: FAMILY MEDICINE

## 2023-06-26 PROCEDURE — 99214 OFFICE O/P EST MOD 30 MIN: CPT | Performed by: FAMILY MEDICINE

## 2023-06-26 RX ORDER — ALPRAZOLAM 1 MG/1
1 TABLET ORAL 3 TIMES DAILY PRN
Qty: 60 TABLET | Refills: 5 | Status: SHIPPED | OUTPATIENT
Start: 2023-06-26 | End: 2023-12-26

## 2023-06-26 RX ORDER — ALPRAZOLAM 1 MG/1
TABLET ORAL
Qty: 30 TABLET | Refills: 0 | OUTPATIENT
Start: 2023-06-26

## 2023-06-26 RX ORDER — LOSARTAN POTASSIUM 100 MG/1
TABLET ORAL
Qty: 90 TABLET | Refills: 3 | Status: SHIPPED | OUTPATIENT
Start: 2023-06-26

## 2023-06-26 RX ORDER — ALPRAZOLAM 1 MG/1
1 TABLET ORAL 3 TIMES DAILY PRN
COMMUNITY
End: 2023-06-26 | Stop reason: SDUPTHER

## 2023-06-26 SDOH — ECONOMIC STABILITY: INCOME INSECURITY: HOW HARD IS IT FOR YOU TO PAY FOR THE VERY BASICS LIKE FOOD, HOUSING, MEDICAL CARE, AND HEATING?: NOT HARD AT ALL

## 2023-06-26 SDOH — ECONOMIC STABILITY: FOOD INSECURITY: WITHIN THE PAST 12 MONTHS, THE FOOD YOU BOUGHT JUST DIDN'T LAST AND YOU DIDN'T HAVE MONEY TO GET MORE.: NEVER TRUE

## 2023-06-26 SDOH — ECONOMIC STABILITY: FOOD INSECURITY: WITHIN THE PAST 12 MONTHS, YOU WORRIED THAT YOUR FOOD WOULD RUN OUT BEFORE YOU GOT MONEY TO BUY MORE.: NEVER TRUE

## 2023-06-26 SDOH — ECONOMIC STABILITY: HOUSING INSECURITY
IN THE LAST 12 MONTHS, WAS THERE A TIME WHEN YOU DID NOT HAVE A STEADY PLACE TO SLEEP OR SLEPT IN A SHELTER (INCLUDING NOW)?: NO

## 2023-06-26 ASSESSMENT — PATIENT HEALTH QUESTIONNAIRE - PHQ9
2. FEELING DOWN, DEPRESSED OR HOPELESS: 0
SUM OF ALL RESPONSES TO PHQ9 QUESTIONS 1 & 2: 0
SUM OF ALL RESPONSES TO PHQ QUESTIONS 1-9: 0
1. LITTLE INTEREST OR PLEASURE IN DOING THINGS: 0
SUM OF ALL RESPONSES TO PHQ QUESTIONS 1-9: 0

## 2023-07-05 ENCOUNTER — TELEPHONE (OUTPATIENT)
Age: 53
End: 2023-07-05

## 2023-07-05 RX ORDER — GLIMEPIRIDE 1 MG/1
1 TABLET ORAL EVERY MORNING
Qty: 90 TABLET | Refills: 3 | Status: SHIPPED | OUTPATIENT
Start: 2023-07-05

## 2023-07-07 ENCOUNTER — HOSPITAL ENCOUNTER (OUTPATIENT)
Facility: HOSPITAL | Age: 53
Discharge: HOME | End: 2023-07-07
Payer: COMMERCIAL

## 2023-07-07 PROCEDURE — 6360000004 HC RX CONTRAST MEDICATION: Performed by: PSYCHIATRY & NEUROLOGY

## 2023-07-07 PROCEDURE — 70553 MRI BRAIN STEM W/O & W/DYE: CPT

## 2023-07-07 PROCEDURE — A9579 GAD-BASE MR CONTRAST NOS,1ML: HCPCS | Performed by: PSYCHIATRY & NEUROLOGY

## 2023-07-07 RX ADMIN — GADOTERIDOL 20 ML: 279.3 INJECTION, SOLUTION INTRAVENOUS at 11:50

## 2023-07-17 ENCOUNTER — TELEPHONE (OUTPATIENT)
Age: 53
End: 2023-07-17

## 2023-07-17 NOTE — TELEPHONE ENCOUNTER
New Rx for Xanax TID dose was sent on 6/26/23 for #60 with 5 refills      For Pharmacy Admin Tracking Only    Program: Medication Refill  CPA in place:    Recommendation Provided To:    Intervention Detail: New Rx: 1, reason: Patient Preference  Intervention Accepted By:   Boyd Love Closed?:    Time Spent (min): 5

## 2023-07-17 NOTE — TELEPHONE ENCOUNTER
----- Message from [place] Has sent at 7/17/2023  9:19 AM EDT -----  Subject: Refill Request    QUESTIONS  Name of Medication? ALPRAZolam (XANAX) 1 MG tablet  Patient-reported dosage and instructions? 1 MG three times per day  How many days do you have left? 4  Preferred Pharmacy? [facility] #[id number]  Pharmacy phone number (if available)? [phone]  Additional Information for Provider? Patient states when he last was in   his Alprazolam was increased from 2 times per day to 3 times per day. He   states the pharmacy did not receive that new prescription so he has been   taking the three times a day dose but now he is going to be running short. Needing the new prescription with the increase sent into the pharmacy.  ---------------------------------------------------------------------------  --------------  [address]  What is the best way for the office to contact you? OK to leave message on   voicemail  Preferred Call Back Phone Number? [phone]  ---------------------------------------------------------------------------  --------------  SCRIPT ANSWERS  Relationship to Patient?  Self

## 2023-07-24 DIAGNOSIS — F43.9 STRESS: ICD-10-CM

## 2023-07-24 RX ORDER — ALPRAZOLAM 1 MG/1
1 TABLET ORAL 3 TIMES DAILY PRN
Qty: 90 TABLET | Refills: 5 | Status: SHIPPED | OUTPATIENT
Start: 2023-07-24 | End: 2024-01-23

## 2023-07-24 NOTE — TELEPHONE ENCOUNTER
Qty on the oprolizam is 60 and it supposed to be 80  didn't put in right as he added him to take it 3x daily.       Wants the nurse to give him a call [phone]

## 2023-09-08 NOTE — TELEPHONE ENCOUNTER
Last office visit 6/26/23. Next office visit 12/19/23. Not sure if patient needs refill on glimepiride or not  patient notes having \"soft feet\" very sensitive. Does not feet like it's edema . Has been seen by neurology and EMG. Appt made for 10/23/23. PATIENT WOULD LIKE TO SPEAK WITH DR. Adria Mario.

## 2023-09-11 DIAGNOSIS — E53.1 VITAMIN B6 DEFICIENCY: ICD-10-CM

## 2023-09-11 RX ORDER — ATORVASTATIN CALCIUM 20 MG/1
20 TABLET, FILM COATED ORAL DAILY
Qty: 30 TABLET | Refills: 5 | Status: SHIPPED | OUTPATIENT
Start: 2023-09-11

## 2023-09-13 RX ORDER — LANOLIN ALCOHOL/MO/W.PET/CERES
50 CREAM (GRAM) TOPICAL DAILY
Qty: 90 TABLET | Refills: 0 | Status: SHIPPED | OUTPATIENT
Start: 2023-09-13

## 2023-10-10 NOTE — TELEPHONE ENCOUNTER
Patient would like to get the medication amitriptyline (ELAVIL) 50 MG tablet/  tamsulosin (FLOMAX) 0.4 MG capsule and Metformin.   Please give him a call @ [phone]

## 2023-10-12 RX ORDER — TAMSULOSIN HYDROCHLORIDE 0.4 MG/1
0.4 CAPSULE ORAL DAILY
Qty: 90 CAPSULE | Refills: 1 | Status: SHIPPED | OUTPATIENT
Start: 2023-10-12

## 2023-10-12 RX ORDER — GLIMEPIRIDE 1 MG/1
1 TABLET ORAL EVERY MORNING
Qty: 30 TABLET | Refills: 0 | Status: SHIPPED | OUTPATIENT
Start: 2023-10-12 | End: 2023-10-13

## 2023-10-12 RX ORDER — AMITRIPTYLINE HYDROCHLORIDE 50 MG/1
50 TABLET, FILM COATED ORAL NIGHTLY
Qty: 30 TABLET | Refills: 0 | Status: SHIPPED | OUTPATIENT
Start: 2023-10-12

## 2023-10-12 NOTE — TELEPHONE ENCOUNTER
Last office visit 6/26/23. Next office visit 10/23/23. Patient not on metformin. Patient called 9/8/23 regarding issue with glimepiride -message given to Dr Silverio Hendrix. Will send in thirty day supply only appt scheduled on 10/23/23.

## 2023-10-13 RX ORDER — GLIMEPIRIDE 1 MG/1
1 TABLET ORAL EVERY MORNING
Qty: 90 TABLET | Refills: 0 | Status: SHIPPED | OUTPATIENT
Start: 2023-10-13

## 2023-10-13 NOTE — TELEPHONE ENCOUNTER
Per pharmacy, patient is requesting a 90 d/s    Requested Prescriptions     Pending Prescriptions Disp Refills    glimepiride (AMARYL) 1 MG tablet [Pharmacy Med Name: GLIMEPIRIDE 1MG TABLETS] 90 tablet 0     Sig: TAKE 1 TABLET BY MOUTH EVERY MORNING FOR DIABETES         For Pharmacy Admin Tracking Only    Program: Medication Refill  CPA in place:    Recommendation Provided To:    Intervention Detail: New Rx: 1, reason: Patient Preference  Intervention Accepted By:   More Bailey Closed?:    Time Spent (min): 5

## 2023-10-23 ENCOUNTER — OFFICE VISIT (OUTPATIENT)
Age: 53
End: 2023-10-23
Payer: COMMERCIAL

## 2023-10-23 VITALS
WEIGHT: 209 LBS | BODY MASS INDEX: 27.7 KG/M2 | SYSTOLIC BLOOD PRESSURE: 116 MMHG | HEIGHT: 73 IN | HEART RATE: 99 BPM | TEMPERATURE: 97.8 F | RESPIRATION RATE: 16 BRPM | OXYGEN SATURATION: 99 % | DIASTOLIC BLOOD PRESSURE: 84 MMHG

## 2023-10-23 DIAGNOSIS — I10 ESSENTIAL (PRIMARY) HYPERTENSION: ICD-10-CM

## 2023-10-23 DIAGNOSIS — E78.00 PURE HYPERCHOLESTEROLEMIA, UNSPECIFIED: ICD-10-CM

## 2023-10-23 DIAGNOSIS — Z23 ENCOUNTER FOR IMMUNIZATION: ICD-10-CM

## 2023-10-23 DIAGNOSIS — Z12.5 PROSTATE CANCER SCREENING: ICD-10-CM

## 2023-10-23 DIAGNOSIS — E78.2 MIXED HYPERLIPIDEMIA: ICD-10-CM

## 2023-10-23 DIAGNOSIS — E11.9 TYPE 2 DIABETES MELLITUS WITHOUT COMPLICATION, WITHOUT LONG-TERM CURRENT USE OF INSULIN (HCC): Primary | ICD-10-CM

## 2023-10-23 LAB — HBA1C MFR BLD: 5.7 %

## 2023-10-23 PROCEDURE — 3074F SYST BP LT 130 MM HG: CPT | Performed by: FAMILY MEDICINE

## 2023-10-23 PROCEDURE — 83036 HEMOGLOBIN GLYCOSYLATED A1C: CPT | Performed by: FAMILY MEDICINE

## 2023-10-23 PROCEDURE — 90471 IMMUNIZATION ADMIN: CPT | Performed by: FAMILY MEDICINE

## 2023-10-23 PROCEDURE — 3051F HG A1C>EQUAL 7.0%<8.0%: CPT | Performed by: FAMILY MEDICINE

## 2023-10-23 PROCEDURE — 99213 OFFICE O/P EST LOW 20 MIN: CPT | Performed by: FAMILY MEDICINE

## 2023-10-23 PROCEDURE — 3079F DIAST BP 80-89 MM HG: CPT | Performed by: FAMILY MEDICINE

## 2023-10-23 PROCEDURE — 90674 CCIIV4 VAC NO PRSV 0.5 ML IM: CPT | Performed by: FAMILY MEDICINE

## 2023-10-23 RX ORDER — SILDENAFIL 100 MG/1
TABLET, FILM COATED ORAL
COMMUNITY
Start: 2023-08-24

## 2023-10-23 NOTE — PROGRESS NOTES
Rosario Barger (:  1970) is a 47 y.o. male,Established patient, here for evaluation of the following chief complaint(s):  Diabetes and Foot Swelling         ASSESSMENT/PLAN:  1. Encounter for immunization  -     Influenza, FLUCELVAX, (age 10 mo+), IM, Preservative Free, 0.5 mL  2. Pure hypercholesterolemia, unspecified  3. Essential (primary) hypertension  4. Mixed hyperlipidemia  -     Lipid Panel; Future  5. Type 2 diabetes mellitus without complication, without long-term current use of insulin (HCC)  -     AMB POC HEMOGLOBIN A1C; Future  6. Prostate cancer screening  -     PSA, Diagnostic; Future      Return in about 6 months (around 2024). Subjective   SUBJECTIVE/OBJECTIVE:  HPI in for diabetes followup. Had to stop glimepiride due to nausea. Started taking his girlfriends metformin 500 mg bid. Used to take this dose. Tolerating the metformin ok now. Has been to see arthritis doctor, but they didn't think he had ra. No complaints of chest pain, shortness of breath, TIAs, claudication or edema. Also needs screening for prostate cancer and cholesterol checked. Review of Systems       Objective   Physical Exam  Cardiovascular:      Rate and Rhythm: Normal rate and regular rhythm. Heart sounds: Normal heart sounds. No murmur heard. Pulmonary:      Effort: Pulmonary effort is normal.      Breath sounds: Normal breath sounds. Abdominal:      General: Abdomen is flat. Bowel sounds are normal.      Palpations: Abdomen is soft. Musculoskeletal:      Right lower leg: No edema. Left lower leg: No edema. An electronic signature was used to authenticate this note.     --Armani Ruiz MD

## 2023-10-23 NOTE — PROGRESS NOTES
Chief Complaint   Patient presents with    Diabetes    Foot Swelling         1. \"Have you been to the ER, urgent care clinic since your last visit? Hospitalized since your last visit? \" no    2. \"Have you seen or consulted any other health care providers outside of the [address] [place] [address] Avenue since your last visit? \" Dr. Velma Garcia      3. For patients aged 43-73: Has the patient had a colonoscopy / FIT/ Cologuard? NO      If the patient is female:    4. For patients aged 43-66: Has the patient had a mammogram within the past 2 years? N/a      5. For patients aged 21-65: Has the patient had a pap smear? N/a      Health Maintenance Due   Topic Date Due    Hepatitis B vaccine (1 of 3 - 3-dose series) Never done    COVID-19 Vaccine (1) Never done    Diabetic retinal exam  Never done    Diabetic foot exam  07/11/2020    Pneumococcal 0-64 years Vaccine (2 - PCV) 01/03/2021    Shingles vaccine (1 of 2) Never done    Diabetic Alb to Cr ratio (uACR) test  03/24/2023    Colorectal Cancer Screen  07/30/2023    Flu vaccine (1) 08/01/2023      Verbal order from Dr Sandy Garcia for flu vaccine in Left deltoid IM.

## 2023-10-24 LAB
CHOLEST SERPL-MCNC: 164 MG/DL
HDLC SERPL-MCNC: 84 MG/DL
HDLC SERPL: 2 (ref 0–5)
LDLC SERPL CALC-MCNC: 5.4 MG/DL (ref 0–100)
PSA SERPL-MCNC: 1.4 NG/ML (ref 0.01–4)
TRIGL SERPL-MCNC: 373 MG/DL
VLDLC SERPL CALC-MCNC: 74.6 MG/DL

## 2024-01-15 ENCOUNTER — OFFICE VISIT (OUTPATIENT)
Age: 54
End: 2024-01-15
Payer: COMMERCIAL

## 2024-01-15 VITALS
DIASTOLIC BLOOD PRESSURE: 87 MMHG | SYSTOLIC BLOOD PRESSURE: 147 MMHG | HEART RATE: 77 BPM | TEMPERATURE: 98.6 F | BODY MASS INDEX: 28.76 KG/M2 | OXYGEN SATURATION: 99 % | WEIGHT: 217 LBS | HEIGHT: 73 IN | RESPIRATION RATE: 16 BRPM

## 2024-01-15 DIAGNOSIS — I10 ESSENTIAL (PRIMARY) HYPERTENSION: ICD-10-CM

## 2024-01-15 DIAGNOSIS — R39.12 WEAK URINARY STREAM: ICD-10-CM

## 2024-01-15 DIAGNOSIS — E78.00 PURE HYPERCHOLESTEROLEMIA, UNSPECIFIED: ICD-10-CM

## 2024-01-15 DIAGNOSIS — K59.00 CONSTIPATION, UNSPECIFIED CONSTIPATION TYPE: ICD-10-CM

## 2024-01-15 DIAGNOSIS — K62.5 RECTAL BLEEDING: Primary | ICD-10-CM

## 2024-01-15 PROCEDURE — 3079F DIAST BP 80-89 MM HG: CPT | Performed by: FAMILY MEDICINE

## 2024-01-15 PROCEDURE — 99213 OFFICE O/P EST LOW 20 MIN: CPT | Performed by: FAMILY MEDICINE

## 2024-01-15 PROCEDURE — 3077F SYST BP >= 140 MM HG: CPT | Performed by: FAMILY MEDICINE

## 2024-01-15 ASSESSMENT — PATIENT HEALTH QUESTIONNAIRE - PHQ9
2. FEELING DOWN, DEPRESSED OR HOPELESS: 0
SUM OF ALL RESPONSES TO PHQ QUESTIONS 1-9: 0
1. LITTLE INTEREST OR PLEASURE IN DOING THINGS: 0
SUM OF ALL RESPONSES TO PHQ QUESTIONS 1-9: 0
SUM OF ALL RESPONSES TO PHQ9 QUESTIONS 1 & 2: 0

## 2024-01-15 NOTE — PROGRESS NOTES
Vinod Vu (:  1970) is a 53 y.o. male,Established patient, here for evaluation of the following chief complaint(s):  6 Month Follow-Up         ASSESSMENT/PLAN:  1. Rectal bleeding  -     AFL - Emmett Maria MD, Colorectal Surgery, [place]  -     CBC with Auto Differential; Future  2. Constipation, unspecified constipation type  -     AFL - Emmett Maria MD, Colorectal Surgery, [place]  3. Weak urinary stream  -     PSA, Diagnostic; Future  -     Urinalysis with Microscopic; Future  4. Pure hypercholesterolemia, unspecified  -     Lipid Panel; Future  5. Essential (primary) hypertension  -     Comprehensive Metabolic Panel; Future  Pt had stopped taking tamsulosin. To resume this medicine.     No follow-ups on file.         Subjective   SUBJECTIVE/OBJECTIVE:  HPI has been having some constipation, and also noted some blood on toilet paper this am. Also has been having some difficulty voiding for the last month. Has a colonoscopy in ? Past hx polyps. Also needs blood pressure and cholesterol checked. No complaints of chest pain, shortness of breath, TIAs, claudication or edema.      Review of Systems       Objective   Physical Exam  Cardiovascular:      Rate and Rhythm: Normal rate and regular rhythm.      Heart sounds: Normal heart sounds. No murmur heard.  Pulmonary:      Effort: Pulmonary effort is normal.      Breath sounds: Normal breath sounds.   Abdominal:      General: Abdomen is flat. Bowel sounds are normal.      Palpations: Abdomen is soft.   Genitourinary:     Prostate: Normal.      Rectum: Normal. Guaiac result negative.   Musculoskeletal:      Right lower leg: No edema.      Left lower leg: No edema.                  An electronic signature was used to authenticate this note.    --Rafy Haddad MD

## 2024-01-15 NOTE — PROGRESS NOTES
Chief Complaint   Patient presents with    6 Month Follow-Up     BP (!) 147/87   Pulse 77   Temp 98.6 °F (37 °C)   Resp 16   Ht 1.854 m (6' 1\")   Wt 98.4 kg (217 lb)   SpO2 99%   BMI 28.63 kg/m²   1. Have you been to the ER, urgent care clinic since your last visit?  Hospitalized since your last visit?No    2. Have you seen or consulted any other health care providers outside of the [facility] since your last visit?  Include any pap smears or colon screening. No

## 2024-01-16 LAB
ALBUMIN SERPL-MCNC: 3.6 G/DL (ref 3.5–5)
ALBUMIN/GLOB SERPL: 1.1 (ref 1.1–2.2)
ALP SERPL-CCNC: 62 U/L (ref 45–117)
ALT SERPL-CCNC: 29 U/L (ref 12–78)
ANION GAP SERPL CALC-SCNC: 6 MMOL/L (ref 5–15)
APPEARANCE UR: CLEAR
AST SERPL-CCNC: 27 U/L (ref 15–37)
BACTERIA URNS QL MICRO: NEGATIVE /HPF
BASOPHILS # BLD: 0 K/UL (ref 0–0.1)
BASOPHILS NFR BLD: 1 % (ref 0–1)
BILIRUB SERPL-MCNC: 0.8 MG/DL (ref 0.2–1)
BILIRUB UR QL: NEGATIVE
BUN SERPL-MCNC: 22 MG/DL (ref 6–20)
BUN/CREAT SERPL: 16 (ref 12–20)
CALCIUM SERPL-MCNC: 9.2 MG/DL (ref 8.5–10.1)
CHLORIDE SERPL-SCNC: 104 MMOL/L (ref 97–108)
CHOLEST SERPL-MCNC: 206 MG/DL
CO2 SERPL-SCNC: 27 MMOL/L (ref 21–32)
COLOR UR: ABNORMAL
CREAT SERPL-MCNC: 1.37 MG/DL (ref 0.7–1.3)
DIFFERENTIAL METHOD BLD: ABNORMAL
EOSINOPHIL # BLD: 0.1 K/UL (ref 0–0.4)
EOSINOPHIL NFR BLD: 3 % (ref 0–7)
EPITH CASTS URNS QL MICRO: ABNORMAL /LPF
ERYTHROCYTE [DISTWIDTH] IN BLOOD BY AUTOMATED COUNT: 13.3 % (ref 11.5–14.5)
GLOBULIN SER CALC-MCNC: 3.4 G/DL (ref 2–4)
GLUCOSE SERPL-MCNC: 116 MG/DL (ref 65–100)
GLUCOSE UR STRIP.AUTO-MCNC: NEGATIVE MG/DL
HCT VFR BLD AUTO: 41.4 % (ref 36.6–50.3)
HDLC SERPL-MCNC: 72 MG/DL
HDLC SERPL: 2.9 (ref 0–5)
HGB BLD-MCNC: 14.9 G/DL (ref 12.1–17)
HGB UR QL STRIP: NEGATIVE
HYALINE CASTS URNS QL MICRO: ABNORMAL /LPF (ref 0–5)
IMM GRANULOCYTES # BLD AUTO: 0 K/UL (ref 0–0.04)
IMM GRANULOCYTES NFR BLD AUTO: 0 % (ref 0–0.5)
KETONES UR QL STRIP.AUTO: NEGATIVE MG/DL
LDLC SERPL CALC-MCNC: 54.4 MG/DL (ref 0–100)
LEUKOCYTE ESTERASE UR QL STRIP.AUTO: ABNORMAL
LYMPHOCYTES # BLD: 2 K/UL (ref 0.8–3.5)
LYMPHOCYTES NFR BLD: 44 % (ref 12–49)
MCH RBC QN AUTO: 34.6 PG (ref 26–34)
MCHC RBC AUTO-ENTMCNC: 36 G/DL (ref 30–36.5)
MCV RBC AUTO: 96.1 FL (ref 80–99)
MONOCYTES # BLD: 0.5 K/UL (ref 0–1)
MONOCYTES NFR BLD: 10 % (ref 5–13)
NEUTS SEG # BLD: 1.9 K/UL (ref 1.8–8)
NEUTS SEG NFR BLD: 42 % (ref 32–75)
NITRITE UR QL STRIP.AUTO: NEGATIVE
NRBC # BLD: 0 K/UL (ref 0–0.01)
NRBC BLD-RTO: 0 PER 100 WBC
PH UR STRIP: 5.5 (ref 5–8)
PLATELET # BLD AUTO: 159 K/UL (ref 150–400)
PMV BLD AUTO: 10.4 FL (ref 8.9–12.9)
POTASSIUM SERPL-SCNC: 3.6 MMOL/L (ref 3.5–5.1)
PROT SERPL-MCNC: 7 G/DL (ref 6.4–8.2)
PROT UR STRIP-MCNC: NEGATIVE MG/DL
PSA SERPL-MCNC: 1.5 NG/ML (ref 0.01–4)
RBC # BLD AUTO: 4.31 M/UL (ref 4.1–5.7)
RBC #/AREA URNS HPF: ABNORMAL /HPF (ref 0–5)
SODIUM SERPL-SCNC: 137 MMOL/L (ref 136–145)
SP GR UR REFRACTOMETRY: 1.02 (ref 1–1.03)
TRIGL SERPL-MCNC: 398 MG/DL
UROBILINOGEN UR QL STRIP.AUTO: 0.2 EU/DL (ref 0.2–1)
VLDLC SERPL CALC-MCNC: 79.6 MG/DL
WBC # BLD AUTO: 4.6 K/UL (ref 4.1–11.1)
WBC URNS QL MICRO: ABNORMAL /HPF (ref 0–4)

## 2024-01-22 ENCOUNTER — CLINICAL DOCUMENTATION (OUTPATIENT)
Age: 54
End: 2024-01-22

## 2024-01-26 ENCOUNTER — OFFICE VISIT (OUTPATIENT)
Age: 54
End: 2024-01-26
Payer: COMMERCIAL

## 2024-01-26 VITALS
RESPIRATION RATE: 18 BRPM | HEART RATE: 92 BPM | BODY MASS INDEX: 28.28 KG/M2 | HEIGHT: 73 IN | SYSTOLIC BLOOD PRESSURE: 180 MMHG | DIASTOLIC BLOOD PRESSURE: 95 MMHG | WEIGHT: 213.4 LBS | TEMPERATURE: 97.6 F | OXYGEN SATURATION: 99 %

## 2024-01-26 DIAGNOSIS — E53.1 VITAMIN B6 DEFICIENCY: ICD-10-CM

## 2024-01-26 DIAGNOSIS — R41.3 COMPLAINTS OF MEMORY DISTURBANCE: ICD-10-CM

## 2024-01-26 DIAGNOSIS — E53.8 VITAMIN B12 DEFICIENCY: ICD-10-CM

## 2024-01-26 DIAGNOSIS — G25.2 ACTION TREMOR: ICD-10-CM

## 2024-01-26 DIAGNOSIS — R20.2 PARESTHESIA OF BILATERAL LEGS: Primary | ICD-10-CM

## 2024-01-26 DIAGNOSIS — I10 HYPERTENSION, UNSPECIFIED TYPE: ICD-10-CM

## 2024-01-26 PROCEDURE — 99215 OFFICE O/P EST HI 40 MIN: CPT

## 2024-01-26 PROCEDURE — 3080F DIAST BP >= 90 MM HG: CPT

## 2024-01-26 PROCEDURE — 3077F SYST BP >= 140 MM HG: CPT

## 2024-01-26 ASSESSMENT — PATIENT HEALTH QUESTIONNAIRE - PHQ9
1. LITTLE INTEREST OR PLEASURE IN DOING THINGS: 0
2. FEELING DOWN, DEPRESSED OR HOPELESS: 0
SUM OF ALL RESPONSES TO PHQ QUESTIONS 1-9: 0
SUM OF ALL RESPONSES TO PHQ QUESTIONS 1-9: 0
SUM OF ALL RESPONSES TO PHQ9 QUESTIONS 1 & 2: 0
SUM OF ALL RESPONSES TO PHQ QUESTIONS 1-9: 0
SUM OF ALL RESPONSES TO PHQ QUESTIONS 1-9: 0

## 2024-01-26 ASSESSMENT — ENCOUNTER SYMPTOMS
ALLERGIC/IMMUNOLOGIC NEGATIVE: 1
EYES NEGATIVE: 1
RESPIRATORY NEGATIVE: 1
GASTROINTESTINAL NEGATIVE: 1

## 2024-01-26 NOTE — PATIENT INSTRUCTIONS
As per our discussion,    Your symptoms may be related due to possible neuropathy given you have A1c of 6.4.  Although you have a EMG done which did not show any neuropathy but remember EMG only test for large fiber neuropathy.  To test for small fiber neuropathy, a skin biopsy will be required.  Research shows given people with prediabetes can experience neuropathic symptoms.  Since you do not want to take any medication at this time, I encourage you to continue to follow-up with your PCP and other specialist for other comorbid conditions as appropriate.  Continue to remain active, and adopt healthy lifestyles which includes nutrition and exercise to help manage her symptoms.    As for your memory, you have neuropsych testing scheduled Dr. Quinn on 2/20/2024 and I think it beneficial for you to come to that visit.    Given that you have been experience time, highly recommended that you obtain an EEG to rule out possible seizures.  Please let us know if you change your wishes.    As for your tremors, I did not see any today but since they are not bothersome to you, will not make any recommendation will continue to follow you for this.  I encouraged her to cut down on annual alcohol intake including the nicotine to see if that helps.  Research shows anxiety/stress, lack of sleep, alcohol, caffeine, can worsen tremors.    As for your blood pressure, I encourage you to take your medication when you get home today.  Recheck your blood pressure later today.  If your blood pressure remains elevated, I encourage you to reach out to your primary care provider and discuss BP readings.    It was a pleasure to meet you today    As per your request, we will see you on an as-needed basis    Please do not hesitate to reach out for any questions or concerns.

## 2024-01-26 NOTE — ASSESSMENT & PLAN NOTE
It was noted patient has a blood pressure of 180/95 this morning.  He is asymptomatic.    He has not taken his medications for blood pressure this morning.    Patient was advised to go and take his medications after the visit and recheck blood pressure later.  If blood pressure remains elevated, he is to reach out to his primary care provider for BP management.    If he starts to experience any strokelike symptoms, he is to go to the ED for evaluation.  He verbalized understanding and agreed with plan of care.

## 2024-01-26 NOTE — PROGRESS NOTES
is taking this medication    Glimepiride Nausea And Vomiting        Current Outpatient Medications   Medication Sig    sildenafil (VIAGRA) 100 MG tablet TAKE 1 TABLET BY MOUTH ONCE DAILY AS NEEDED PRIOR TO SEXUAL ACTIVITY    metFORMIN (GLUCOPHAGE) 500 MG tablet Take 0.5 tablets by mouth 2 times daily (with meals) For diabetes    tamsulosin (FLOMAX) 0.4 MG capsule Take 1 capsule by mouth daily    vitamin B-6 (PYRIDOXINE) 50 MG tablet TAKE 1 TABLET BY MOUTH EVERY DAY    atorvastatin (LIPITOR) 20 MG tablet Take 1 tablet by mouth daily (Patient taking differently: Take 1 tablet by mouth at bedtime)    losartan (COZAAR) 100 MG tablet TAKE 1 TABLET BY MOUTH ONCE DAILY FOR BLOOD PRESSURE    vitamin D (ERGOCALCIFEROL) 1.25 MG (25799 UT) CAPS capsule Take 1 capsule by mouth once a week     No current facility-administered medications for this visit.       Past medical history/surgical history, family history, and social history have been reviewed for today's visit      Review of Systems   Constitutional: Negative.    HENT: Negative.     Eyes: Negative.    Respiratory: Negative.     Cardiovascular: Negative.    Gastrointestinal: Negative.    Endocrine: Negative.    Musculoskeletal:  Positive for gait problem (balance issues).   Skin: Negative.    Allergic/Immunologic: Negative.    Neurological:  Positive for tremors.   Hematological: Negative.    Psychiatric/Behavioral:  Confusion: memory loss. The patient is nervous/anxious (around people).         A ten system review of constitutional, cardiovascular, respiratory, musculoskeletal, endocrine, skin, SHEENT, genitourinary, psychiatric and neurologic systems was obtained and is unremarkable except as mentioned under HPI          EXAMINATION:     Vitals:    01/26/24 0907   BP: (!) 180/95   Site: Right Upper Arm   Position: Sitting   Cuff Size: Medium Adult   Pulse: 92   Resp: 18   Temp: 97.6 °F (36.4 °C)   TempSrc: Temporal   SpO2: 99%   Weight: 96.8 kg (213 lb 6.4 oz)   Height:

## 2024-01-26 NOTE — ASSESSMENT & PLAN NOTE
Has a history of close head injury x 2.  He was referred to neuropsych for evaluation and is scheduled for 2/20/2024    His recent brain MRI with and without contrast completed on 7/7/2023 was normal.    At the time of my evaluation this morning, patient verbalized he feels like he is outside of himself at times but denied any seizure-like activity.  For workup completeness, he would be advisable for patient to obtain an EEG to rule out any possible seizures that may contribute to his memory loss but patient deferred at this time and verbalized he does not have a lot of time to take from work.    He started to questioning whether he needs neuropsych evaluation.  The indication for neuropsych evaluation was discussed to patient and encouraged him to keep that appointment.    He is to continue to take his vitamin B6 and B12 as prescribed.    Patient was encouraged to engage in approximately 2.5 hours of physician approved physical activity on a weekly basis.    Patient was encouraged to maintain a healthy diet. they will also be informed of the Mediterranean diet, which has been associated with reduced risk for neurodegenerative conditions as well as heart disease.    Patient was encouraged to maintain a cognitively stimulated lifestyle, also incorporating social interaction.

## 2024-01-26 NOTE — ASSESSMENT & PLAN NOTE
Patient continues to experience paresthesias in bilateral lower extremities.     His symptoms still could be related due to peripheral neuropathy given he has been experiencing gait difficulty and him being prediabetic.  His last A1c was 6.4.    EMG completed on 5/25/2023 was normal.    Skin biopsy was discussed with the patient today to rule out any small fiber neuropathy since EMG only test for large fiber neuropathy but he deferred at this time.    He does not want to be on any medications for his symptoms and he verbalized he would start working on his diet and exercise to help manage his symptoms.    He was encouraged to continue to follow-up with his PCP and other specialists for other comorbid conditions as appropriate.  Continue to remain active and adopt healthy lifestyles which includes nutrition and exercise to help alleviate the symptoms.

## 2024-01-26 NOTE — ASSESSMENT & PLAN NOTE
His current neurological exam did not reveal any tremors in bilateral hands.    Possible related due to his alcohol intake and stress/anxiety.    Factors that may exacerbate his tremors were discussed to patient today.    Since his symptoms are not bothersome to him at this time, will not make any recommendations and will continue to monitor him for this.

## 2024-01-27 DIAGNOSIS — F43.9 STRESS: ICD-10-CM

## 2024-01-27 DIAGNOSIS — E53.1 VITAMIN B6 DEFICIENCY: ICD-10-CM

## 2024-01-29 ENCOUNTER — TELEPHONE (OUTPATIENT)
Age: 54
End: 2024-01-29

## 2024-01-29 DIAGNOSIS — F43.9 STRESS: ICD-10-CM

## 2024-01-29 RX ORDER — LANOLIN ALCOHOL/MO/W.PET/CERES
50 CREAM (GRAM) TOPICAL DAILY
Qty: 90 TABLET | Refills: 0 | Status: SHIPPED | OUTPATIENT
Start: 2024-01-29

## 2024-01-29 RX ORDER — ALPRAZOLAM 1 MG/1
TABLET ORAL
Qty: 90 TABLET | OUTPATIENT
Start: 2024-01-29

## 2024-01-29 RX ORDER — ALPRAZOLAM 1 MG/1
TABLET ORAL
Qty: 90 TABLET | Refills: 2 | Status: SHIPPED | OUTPATIENT
Start: 2024-01-29 | End: 2024-01-30 | Stop reason: SDUPTHER

## 2024-01-29 NOTE — TELEPHONE ENCOUNTER
Patient states that he would like to get a RX refill ALPRAZolam (XANAX) 1 MG tablet please fill today he can be reached @ [phone]

## 2024-01-29 NOTE — TELEPHONE ENCOUNTER
Last office visit 1/26/2024 with Owen  Next office visit no future appointment scheduled  Last med refill 9/13/2023

## 2024-01-30 DIAGNOSIS — F43.9 STRESS: ICD-10-CM

## 2024-01-30 RX ORDER — ALPRAZOLAM 1 MG/1
TABLET ORAL
Qty: 90 TABLET | Refills: 1 | Status: SHIPPED | OUTPATIENT
Start: 2024-01-30 | End: 2024-03-30

## 2024-02-12 RX ORDER — TAMSULOSIN HYDROCHLORIDE 0.4 MG/1
0.4 CAPSULE ORAL DAILY
Qty: 90 CAPSULE | Refills: 3 | Status: SHIPPED | OUTPATIENT
Start: 2024-02-12

## 2024-02-12 RX ORDER — ATORVASTATIN CALCIUM 20 MG/1
20 TABLET, FILM COATED ORAL DAILY
Qty: 90 TABLET | Refills: 3 | Status: SHIPPED | OUTPATIENT
Start: 2024-02-12

## 2024-02-12 NOTE — TELEPHONE ENCOUNTER
Last appointment: 1/15/24  Next appointment: 4/24/24  Previous refill encounter(s): 10/12/23 Flomax #90 with 1 refill, 9/11/23 Lipitor #30 with 5 refills    Requested Prescriptions     Pending Prescriptions Disp Refills    tamsulosin (FLOMAX) 0.4 MG capsule [Pharmacy Med Name: TAMSULOSIN 0.4MG CAPSULES] 90 capsule 3     Sig: TAKE 1 CAPSULE BY MOUTH DAILY    atorvastatin (LIPITOR) 20 MG tablet [Pharmacy Med Name: ATORVASTATIN 20MG TABLETS] 90 tablet 3     Sig: TAKE 1 TABLET BY MOUTH DAILY         For Pharmacy Admin Tracking Only    Program: Medication Refill  CPA in place:    Recommendation Provided To:   Intervention Detail: New Rx: 2, reason: Patient Preference  Intervention Accepted By:   Gap Closed?:    Time Spent (min): 5

## 2024-02-22 DIAGNOSIS — E55.9 VITAMIN D DEFICIENCY: ICD-10-CM

## 2024-02-22 RX ORDER — ERGOCALCIFEROL 1.25 MG/1
50000 CAPSULE ORAL WEEKLY
Qty: 12 CAPSULE | Refills: 3 | OUTPATIENT
Start: 2024-02-22

## 2024-02-22 NOTE — TELEPHONE ENCOUNTER
Last office visit 1/26/2024 with Owen  Next office visit none scheduled as the patient was told to return if symptoms worsen or fail to improve.   Last med refill 5/17/2023

## 2024-05-07 DIAGNOSIS — E53.1 VITAMIN B6 DEFICIENCY: ICD-10-CM

## 2024-05-07 RX ORDER — TAMSULOSIN HYDROCHLORIDE 0.4 MG/1
0.4 CAPSULE ORAL DAILY
Qty: 90 CAPSULE | Refills: 3 | OUTPATIENT
Start: 2024-05-07

## 2024-05-07 RX ORDER — ATORVASTATIN CALCIUM 20 MG/1
20 TABLET, FILM COATED ORAL DAILY
Qty: 90 TABLET | Refills: 3 | OUTPATIENT
Start: 2024-05-07

## 2024-05-07 RX ORDER — LANOLIN ALCOHOL/MO/W.PET/CERES
50 CREAM (GRAM) TOPICAL DAILY
Qty: 90 TABLET | Refills: 3 | Status: SHIPPED | OUTPATIENT
Start: 2024-05-07

## 2024-05-07 RX ORDER — LOSARTAN POTASSIUM 100 MG/1
TABLET ORAL
Qty: 90 TABLET | Refills: 3 | Status: SHIPPED | OUTPATIENT
Start: 2024-05-07

## 2024-05-07 NOTE — TELEPHONE ENCOUNTER
Flomax was sent on 2/12/24 for #90 with 3 refills.  Lipitor was sent on 2/12/24 for #90 with 3 refills.  Glucophage was sent on 10/23/23 for a 90 d/s with 3 refills.    Last appointment: 1/15/24  Next appointment: 6/5/24  Previous refill encounter(s): 6/26/23 Cozaar #90 with 3 refills, 1/29/24 B-6 #90    Requested Prescriptions     Pending Prescriptions Disp Refills    losartan (COZAAR) 100 MG tablet 90 tablet 3     Sig: TAKE 1 TABLET BY MOUTH ONCE DAILY FOR BLOOD PRESSURE    vitamin B-6 (PYRIDOXINE) 50 MG tablet 90 tablet 3     Sig: Take 1 tablet by mouth daily     Refused Prescriptions Disp Refills    metFORMIN (GLUCOPHAGE) 500 MG tablet 90 tablet 3     Sig: Take 0.5 tablets by mouth 2 times daily (with meals) For diabetes     Refused By: SELFCHANTELL     Reason for Refusal: Request already responded to by other means (e.g. phone or fax)    tamsulosin (FLOMAX) 0.4 MG capsule 90 capsule 3     Sig: Take 1 capsule by mouth daily     Refused By: SELF, CHANTLEL     Reason for Refusal: Request already responded to by other means (e.g. phone or fax)    atorvastatin (LIPITOR) 20 MG tablet 90 tablet 3     Sig: Take 1 tablet by mouth daily     Refused By: SELFCHANTELL     Reason for Refusal: Request already responded to by other means (e.g. phone or fax)         For Pharmacy Admin Tracking Only    Program: Medication Refill  CPA in place:    Recommendation Provided To:   Intervention Detail: Discontinued Rx: 3, reason: Duplicate Therapy and New Rx: 2, reason: Patient Preference  Intervention Accepted By:   Gap Closed?:    Time Spent (min): 5  
refills to be sent to pharmacy.  ---------------------------------------------------------------------------  --------------,  Name of Medication? atorvastatin (LIPITOR) 20 MG tablet  Patient-reported dosage and instructions? 1 tablet daily  How many days do you have left? 0  Preferred Pharmacy? [id number]  Pharmacy phone number (if available)? [phone]  Additional Information for Provider? pt will run out of meds before 6/5   appt and is in need of refills to be sent to pharmacy.  ---------------------------------------------------------------------------  --------------  CALL BACK INFO  What is the best way for the office to contact you? OK to leave message on   voicemail  Preferred Call Back Phone Number? [phone]  ---------------------------------------------------------------------------  --------------  SCRIPT ANSWERS  Relationship to Patient? Self

## 2024-05-19 DIAGNOSIS — E55.9 VITAMIN D DEFICIENCY: ICD-10-CM

## 2024-05-20 RX ORDER — ERGOCALCIFEROL 1.25 MG/1
50000 CAPSULE ORAL WEEKLY
Qty: 12 CAPSULE | Refills: 3 | Status: SHIPPED | OUTPATIENT
Start: 2024-05-20

## 2024-05-20 NOTE — TELEPHONE ENCOUNTER
Last office visit 1/26/2024 with Errol Weaver  Next office visit return if symptoms dont improve from last AVS with Errol  Last med refill vitamin D 5/17/2023

## 2024-05-31 RX ORDER — PREDNISONE 20 MG/1
20 TABLET ORAL 2 TIMES DAILY
Qty: 10 TABLET | Refills: 0 | Status: SHIPPED | OUTPATIENT
Start: 2024-05-31

## 2024-05-31 NOTE — TELEPHONE ENCOUNTER
Last appointment: 1/15/24  Next appointment: 6/5/24  Previous refill encounter(s): 11/28/22    Requested Prescriptions     Pending Prescriptions Disp Refills    predniSONE (DELTASONE) 20 MG tablet [Pharmacy Med Name: PREDNISONE 20MG TABLETS] 10 tablet 0     Sig: TAKE 1 TABLET BY MOUTH TWICE DAILY         For Pharmacy Admin Tracking Only    Program: Medication Refill  CPA in place:    Recommendation Provided To:   Intervention Detail: New Rx: 1, reason: Patient Preference  Intervention Accepted By:   Gap Closed?:    Time Spent (min): 5

## 2024-06-05 ENCOUNTER — OFFICE VISIT (OUTPATIENT)
Age: 54
End: 2024-06-05
Payer: COMMERCIAL

## 2024-06-05 DIAGNOSIS — I10 ESSENTIAL (PRIMARY) HYPERTENSION: ICD-10-CM

## 2024-06-05 DIAGNOSIS — L03.116 CELLULITIS OF LEFT LOWER EXTREMITY: ICD-10-CM

## 2024-06-05 DIAGNOSIS — E78.00 PURE HYPERCHOLESTEROLEMIA, UNSPECIFIED: Primary | ICD-10-CM

## 2024-06-05 DIAGNOSIS — E11.9 TYPE 2 DIABETES MELLITUS WITHOUT COMPLICATION, WITHOUT LONG-TERM CURRENT USE OF INSULIN (HCC): ICD-10-CM

## 2024-06-05 DIAGNOSIS — E78.2 MIXED HYPERLIPIDEMIA: ICD-10-CM

## 2024-06-05 PROCEDURE — 99214 OFFICE O/P EST MOD 30 MIN: CPT | Performed by: FAMILY MEDICINE

## 2024-06-05 RX ORDER — AMOXICILLIN AND CLAVULANATE POTASSIUM 875; 125 MG/1; MG/1
1 TABLET, FILM COATED ORAL 2 TIMES DAILY
Qty: 14 TABLET | Refills: 0 | Status: SHIPPED | OUTPATIENT
Start: 2024-06-05 | End: 2024-06-12

## 2024-06-05 RX ORDER — PREDNISONE 10 MG/1
10 TABLET ORAL 2 TIMES DAILY
Qty: 30 TABLET | Refills: 1 | Status: SHIPPED | OUTPATIENT
Start: 2024-06-05 | End: 2024-07-05

## 2024-06-05 NOTE — PROGRESS NOTES
Vinod Vu (:  1970) is a 53 y.o. male,Established patient, here for evaluation of the following chief complaint(s):  Follow-up      Assessment & Plan   1. Pure hypercholesterolemia, unspecified  -     Lipid Panel; Future  2. Essential (primary) hypertension  -     CBC with Auto Differential; Future  -     Comprehensive Metabolic Panel; Future  3. Mixed hyperlipidemia  4. Type 2 diabetes mellitus without complication, without long-term current use of insulin (HCC)  -     Hemoglobin A1C; Future  5. Cellulitis of left lower extremity      No follow-ups on file.       Subjective   HPI Had blood in toilet after a bm 2 weeks ago. Happened 2 days in a row. Had a colonoscopyh 3-2024 ([facility]. [facility]) showing one large serrated polyp. Repeat in 3 years. Fell on a truss and thinks pulled a nail out of his L leg. Had a tetanus shot 2 years ago. Also needs diabetes, blood pressure and cholesterol checked. No complaints of chest pain, shortness of breath, TIAs, claudication or edema.      Review of Systems       Objective   Physical Exam  Cardiovascular:      Rate and Rhythm: Normal rate and regular rhythm.      Heart sounds: Normal heart sounds. No murmur heard.  Pulmonary:      Effort: Pulmonary effort is normal.      Breath sounds: Normal breath sounds.   Abdominal:      General: Abdomen is flat. Bowel sounds are normal.      Palpations: Abdomen is soft.   Musculoskeletal:      Right lower leg: No edema.      Left lower leg: No edema.      Comments: L leg- 2 puncture wounds on L thigh. Mild tenderness and swelling between them.                   An electronic signature was used to authenticate this note.    --Rafy Haddad MD

## 2024-06-06 LAB
ALBUMIN SERPL-MCNC: 3.7 G/DL (ref 3.5–5)
ALBUMIN/GLOB SERPL: 1 (ref 1.1–2.2)
ALP SERPL-CCNC: 66 U/L (ref 45–117)
ALT SERPL-CCNC: 31 U/L (ref 12–78)
ANION GAP SERPL CALC-SCNC: 7 MMOL/L (ref 5–15)
AST SERPL-CCNC: 26 U/L (ref 15–37)
BASOPHILS # BLD: 0 K/UL (ref 0–0.1)
BASOPHILS NFR BLD: 1 % (ref 0–1)
BILIRUB SERPL-MCNC: 0.6 MG/DL (ref 0.2–1)
BUN SERPL-MCNC: 18 MG/DL (ref 6–20)
BUN/CREAT SERPL: 14 (ref 12–20)
CALCIUM SERPL-MCNC: 9.1 MG/DL (ref 8.5–10.1)
CHLORIDE SERPL-SCNC: 105 MMOL/L (ref 97–108)
CHOLEST SERPL-MCNC: 146 MG/DL
CO2 SERPL-SCNC: 27 MMOL/L (ref 21–32)
CREAT SERPL-MCNC: 1.32 MG/DL (ref 0.7–1.3)
DIFFERENTIAL METHOD BLD: NORMAL
EOSINOPHIL # BLD: 0.1 K/UL (ref 0–0.4)
EOSINOPHIL NFR BLD: 1 % (ref 0–7)
ERYTHROCYTE [DISTWIDTH] IN BLOOD BY AUTOMATED COUNT: 12.8 % (ref 11.5–14.5)
EST. AVERAGE GLUCOSE BLD GHB EST-MCNC: 128 MG/DL
GLOBULIN SER CALC-MCNC: 3.6 G/DL (ref 2–4)
GLUCOSE SERPL-MCNC: 128 MG/DL (ref 65–100)
HBA1C MFR BLD: 6.1 % (ref 4–5.6)
HCT VFR BLD AUTO: 43.7 % (ref 36.6–50.3)
HDLC SERPL-MCNC: 77 MG/DL
HDLC SERPL: 1.9 (ref 0–5)
HGB BLD-MCNC: 15 G/DL (ref 12.1–17)
IMM GRANULOCYTES # BLD AUTO: 0 K/UL (ref 0–0.04)
IMM GRANULOCYTES NFR BLD AUTO: 0 % (ref 0–0.5)
LDLC SERPL CALC-MCNC: 29.6 MG/DL (ref 0–100)
LYMPHOCYTES # BLD: 2 K/UL (ref 0.8–3.5)
LYMPHOCYTES NFR BLD: 44 % (ref 12–49)
MCH RBC QN AUTO: 33.1 PG (ref 26–34)
MCHC RBC AUTO-ENTMCNC: 34.3 G/DL (ref 30–36.5)
MCV RBC AUTO: 96.5 FL (ref 80–99)
MONOCYTES # BLD: 0.5 K/UL (ref 0–1)
MONOCYTES NFR BLD: 10 % (ref 5–13)
NEUTS SEG # BLD: 2 K/UL (ref 1.8–8)
NEUTS SEG NFR BLD: 44 % (ref 32–75)
NRBC # BLD: 0 K/UL (ref 0–0.01)
NRBC BLD-RTO: 0 PER 100 WBC
PLATELET # BLD AUTO: 178 K/UL (ref 150–400)
PMV BLD AUTO: 10.9 FL (ref 8.9–12.9)
POTASSIUM SERPL-SCNC: 3.7 MMOL/L (ref 3.5–5.1)
PROT SERPL-MCNC: 7.3 G/DL (ref 6.4–8.2)
RBC # BLD AUTO: 4.53 M/UL (ref 4.1–5.7)
SODIUM SERPL-SCNC: 139 MMOL/L (ref 136–145)
TRIGL SERPL-MCNC: 197 MG/DL
VLDLC SERPL CALC-MCNC: 39.4 MG/DL
WBC # BLD AUTO: 4.6 K/UL (ref 4.1–11.1)

## 2024-06-21 RX ORDER — AMITRIPTYLINE HYDROCHLORIDE 50 MG/1
50 TABLET, FILM COATED ORAL NIGHTLY
Qty: 30 TABLET | Refills: 0 | OUTPATIENT
Start: 2024-06-21

## 2024-06-24 RX ORDER — AMITRIPTYLINE HYDROCHLORIDE 50 MG/1
50 TABLET, FILM COATED ORAL NIGHTLY
Qty: 30 TABLET | Refills: 0 | Status: SHIPPED | OUTPATIENT
Start: 2024-06-24

## 2024-06-24 NOTE — TELEPHONE ENCOUNTER
Patient states he has started taking this medication again and it is working for him. He is requesting a refill.    Last appointment: 6/5/24  Next appointment: 12/5/24  Previous refill encounter(s): 10/12/23 #30    Requested Prescriptions     Pending Prescriptions Disp Refills    amitriptyline (ELAVIL) 50 MG tablet 30 tablet 0     Sig: Take 1 tablet by mouth nightly         For Pharmacy Admin Tracking Only    Program: Medication Refill  CPA in place:    Recommendation Provided To:   Intervention Detail: New Rx: 1, reason: Patient Preference  Intervention Accepted By:   Gap Closed?:    Time Spent (min): 5

## 2024-06-24 NOTE — TELEPHONE ENCOUNTER
Patient would like to continue taking   amitriptyline (ELAVIL) 50 MG tablet  please fill RX he can be reached @ [phone]

## 2024-06-28 DIAGNOSIS — F43.9 STRESS: ICD-10-CM

## 2024-06-28 RX ORDER — ALPRAZOLAM 1 MG/1
TABLET ORAL
Qty: 90 TABLET | Refills: 2 | Status: SHIPPED | OUTPATIENT
Start: 2024-06-28 | End: 2024-09-13

## 2024-07-22 RX ORDER — AMITRIPTYLINE HYDROCHLORIDE 50 MG/1
50 TABLET, FILM COATED ORAL NIGHTLY
Qty: 90 TABLET | Refills: 1 | Status: SHIPPED | OUTPATIENT
Start: 2024-07-22

## 2024-09-05 ENCOUNTER — OFFICE VISIT (OUTPATIENT)
Age: 54
End: 2024-09-05
Payer: COMMERCIAL

## 2024-09-05 DIAGNOSIS — L03.113 CELLULITIS OF RIGHT UPPER EXTREMITY: Primary | ICD-10-CM

## 2024-09-05 PROCEDURE — 99213 OFFICE O/P EST LOW 20 MIN: CPT | Performed by: FAMILY MEDICINE

## 2024-09-05 RX ORDER — IBUPROFEN 200 MG
TABLET ORAL
Qty: 40 TABLET | Refills: 3
Start: 2024-09-05

## 2024-09-05 RX ORDER — CEPHALEXIN 500 MG/1
500 CAPSULE ORAL 3 TIMES DAILY
Qty: 30 CAPSULE | Refills: 0 | Status: SHIPPED | OUTPATIENT
Start: 2024-09-05 | End: 2024-09-15

## 2024-09-05 NOTE — PROGRESS NOTES
Vinod Vu (:  1970) is a 53 y.o. male,Established patient, here for evaluation of the following chief complaint(s):  Wound Check (Possible bug bit posterior right arm)         Assessment & Plan  Cellulitis of right upper extremity       Orders:    cephALEXin (KEFLEX) 500 MG capsule; Take 1 capsule by mouth 3 times daily for 10 days    ibuprofen (ADVIL) 200 MG tablet; 2 tabs po every 6 hours as needed for pain      No follow-ups on file.       Subjective   HPI Has a red area in R medial upper arm. Lesion is very painful. Some chills, no fever. Not sure if was bitten by anything. No real itching,    Review of Systems       Objective   Physical Exam  Cardiovascular:      Rate and Rhythm: Normal rate and regular rhythm.      Heart sounds: Normal heart sounds. No murmur heard.  Pulmonary:      Effort: Pulmonary effort is normal.      Breath sounds: Normal breath sounds.   Abdominal:      General: Abdomen is flat. Bowel sounds are normal.      Palpations: Abdomen is soft.   Musculoskeletal:      Right lower leg: No edema.      Left lower leg: No edema.      Comments: R mid medial humerus area- 2 x 1 cm area of erythema and tenderness                    An electronic signature was used to authenticate this note.    --Rafy Haddad MD   
Chief Complaint   Patient presents with    Wound Check     Possible bug bit posterior right arm       \"Have you been to the ER, urgent care clinic since your last visit?  Hospitalized since your last visit?\"    NO    “Have you seen or consulted any other health care providers outside of [facility] since your last visit?”    NO        “Have you had a colorectal cancer screening such as a colonoscopy/FIT/Cologuard?        Date of last Colonoscopy: 2013  No cologuard on file  No FIT/FOBT on file   No flexible sigmoidoscopy on file         Click Here for Release of Records Request       There were no vitals filed for this visit.   Health Maintenance Due   Topic Date Due    Diabetic retinal exam  Never done    Hepatitis B vaccine (1 of 3 - 19+ 3-dose series) Never done    Diabetic foot exam  2020    Pneumococcal 0-64 years Vaccine (2 of 2 - PCV) 2021    Shingles vaccine (1 of 2) Never done    Diabetic Alb to Cr ratio (uACR) test  2023    Colorectal Cancer Screen  2023    Flu vaccine (1) 2024    COVID-19 Vaccine (1 - - season) Never done        The patient, Vinod Johnsoner, identity was verified by name and .   
2

## 2024-09-30 DIAGNOSIS — F43.9 STRESS: ICD-10-CM

## 2024-10-03 RX ORDER — ALPRAZOLAM 1 MG
1 TABLET ORAL 3 TIMES DAILY PRN
Qty: 90 TABLET | Refills: 1 | Status: SHIPPED | OUTPATIENT
Start: 2024-10-03 | End: 2025-01-01

## 2024-10-16 ENCOUNTER — OFFICE VISIT (OUTPATIENT)
Age: 54
End: 2024-10-16

## 2024-10-16 VITALS
RESPIRATION RATE: 16 BRPM | WEIGHT: 197 LBS | HEART RATE: 136 BPM | BODY MASS INDEX: 25.99 KG/M2 | DIASTOLIC BLOOD PRESSURE: 89 MMHG | TEMPERATURE: 97.7 F | SYSTOLIC BLOOD PRESSURE: 143 MMHG | OXYGEN SATURATION: 98 %

## 2024-10-16 DIAGNOSIS — R00.0 TACHYCARDIA: ICD-10-CM

## 2024-10-16 DIAGNOSIS — I95.0 IDIOPATHIC HYPOTENSION: ICD-10-CM

## 2024-10-16 DIAGNOSIS — R00.0 TACHYCARDIA: Primary | ICD-10-CM

## 2024-10-16 RX ORDER — METOPROLOL SUCCINATE 50 MG/1
50 TABLET, EXTENDED RELEASE ORAL DAILY
Qty: 30 TABLET | Refills: 0 | Status: SHIPPED | OUTPATIENT
Start: 2024-10-16

## 2024-10-16 SDOH — ECONOMIC STABILITY: FOOD INSECURITY: WITHIN THE PAST 12 MONTHS, YOU WORRIED THAT YOUR FOOD WOULD RUN OUT BEFORE YOU GOT MONEY TO BUY MORE.: NEVER TRUE

## 2024-10-16 SDOH — ECONOMIC STABILITY: INCOME INSECURITY: HOW HARD IS IT FOR YOU TO PAY FOR THE VERY BASICS LIKE FOOD, HOUSING, MEDICAL CARE, AND HEATING?: NOT HARD AT ALL

## 2024-10-16 SDOH — ECONOMIC STABILITY: FOOD INSECURITY: WITHIN THE PAST 12 MONTHS, THE FOOD YOU BOUGHT JUST DIDN'T LAST AND YOU DIDN'T HAVE MONEY TO GET MORE.: NEVER TRUE

## 2024-10-16 NOTE — PROGRESS NOTES
Vinod Vu (:  1970) is a 54 y.o. male,Established patient, here for evaluation of the following chief complaint(s):  No chief complaint on file.         Assessment & Plan  Tachycardia       Orders:    AMB POC EKG ROUTINE W/ 12 LEADS, SCREEN ()    CBC with Auto Differential; Future    Comprehensive Metabolic Panel; Future    T4, Free; Future    TSH; Future    Idiopathic hypotension    Pt to dc losartan. To start metoprolol xl 50 mg tonight and 50 mg tomorrow am. We discussed going to ER, however, pt says that he can't afford to do this and refuses this option. Will recheck at 1230 pm tomorrow.   EKG shos old Q waves in inferior leads that were present several years ago.   Orders:    XR CHEST (2 VIEWS); Future      No follow-ups on file.       Subjective   HPI 2 weeks ago, had come back off of a cruise, was standing on a chair trying to get something on a shelf, not sure if passed out or fell. [place] bad when got off the cruise ship. ship- felt like things were super bright, felt nauseated. Injured R medial proximal tibial area. Has noted that pulse has been around 120 during the day. No prior hx passing out. Had rectal incontinence during the episode. Has also noted to have orthostatic dizziness whenever bent over at work. Has had this orthostatic diszzines for 6 months. Gets confused and has memory loss at times also. Also has had some cramping in hands and feet at times. NO real chest pains. Has had some fatigue and feels like muscles get tired quickly. Has lost 10-15 lbs since . Says that heart has been racing for the last 2 weeks.     Review of Systems       Objective   Physical Exam  Cardiovascular:      Rate and Rhythm: Normal rate and regular rhythm.      Heart sounds: Normal heart sounds. No murmur heard.     Comments: Bp 90/60 L arm, 110/50 in R arm. Pulse 136.  Pulmonary:      Effort: Pulmonary effort is normal.      Breath sounds: Normal breath sounds.   Abdominal:      General:

## 2024-10-17 ENCOUNTER — OFFICE VISIT (OUTPATIENT)
Age: 54
End: 2024-10-17
Payer: COMMERCIAL

## 2024-10-17 VITALS — DIASTOLIC BLOOD PRESSURE: 80 MMHG | HEART RATE: 100 BPM | SYSTOLIC BLOOD PRESSURE: 120 MMHG

## 2024-10-17 DIAGNOSIS — R00.0 SINUS TACHYCARDIA: Primary | ICD-10-CM

## 2024-10-17 DIAGNOSIS — R79.89 ELEVATED SERUM CREATININE: ICD-10-CM

## 2024-10-17 LAB
ALBUMIN SERPL-MCNC: 3.7 G/DL (ref 3.5–5)
ALBUMIN/GLOB SERPL: 0.9 (ref 1.1–2.2)
ALP SERPL-CCNC: 95 U/L (ref 45–117)
ALT SERPL-CCNC: 82 U/L (ref 12–78)
ANION GAP SERPL CALC-SCNC: 8 MMOL/L (ref 2–12)
AST SERPL-CCNC: 108 U/L (ref 15–37)
BASOPHILS # BLD: 0 K/UL (ref 0–0.1)
BASOPHILS NFR BLD: 1 % (ref 0–1)
BILIRUB SERPL-MCNC: 1 MG/DL (ref 0.2–1)
BUN SERPL-MCNC: 13 MG/DL (ref 6–20)
BUN/CREAT SERPL: 7 (ref 12–20)
CALCIUM SERPL-MCNC: 9 MG/DL (ref 8.5–10.1)
CHLORIDE SERPL-SCNC: 102 MMOL/L (ref 97–108)
CO2 SERPL-SCNC: 26 MMOL/L (ref 21–32)
CREAT SERPL-MCNC: 1.9 MG/DL (ref 0.7–1.3)
DIFFERENTIAL METHOD BLD: ABNORMAL
EOSINOPHIL # BLD: 0 K/UL (ref 0–0.4)
EOSINOPHIL NFR BLD: 0 % (ref 0–7)
ERYTHROCYTE [DISTWIDTH] IN BLOOD BY AUTOMATED COUNT: 13.1 % (ref 11.5–14.5)
GLOBULIN SER CALC-MCNC: 3.9 G/DL (ref 2–4)
GLUCOSE SERPL-MCNC: 229 MG/DL (ref 65–100)
HCT VFR BLD AUTO: 42.1 % (ref 36.6–50.3)
HGB BLD-MCNC: 14.3 G/DL (ref 12.1–17)
IMM GRANULOCYTES # BLD AUTO: 0 K/UL (ref 0–0.04)
IMM GRANULOCYTES NFR BLD AUTO: 1 % (ref 0–0.5)
LYMPHOCYTES # BLD: 0.9 K/UL (ref 0.8–3.5)
LYMPHOCYTES NFR BLD: 22 % (ref 12–49)
MCH RBC QN AUTO: 34.8 PG (ref 26–34)
MCHC RBC AUTO-ENTMCNC: 34 G/DL (ref 30–36.5)
MCV RBC AUTO: 102.4 FL (ref 80–99)
MONOCYTES # BLD: 0.4 K/UL (ref 0–1)
MONOCYTES NFR BLD: 9 % (ref 5–13)
NEUTS SEG # BLD: 2.8 K/UL (ref 1.8–8)
NEUTS SEG NFR BLD: 67 % (ref 32–75)
NRBC # BLD: 0 K/UL (ref 0–0.01)
NRBC BLD-RTO: 0 PER 100 WBC
PLATELET # BLD AUTO: 198 K/UL (ref 150–400)
PMV BLD AUTO: 10.8 FL (ref 8.9–12.9)
POTASSIUM SERPL-SCNC: 3.9 MMOL/L (ref 3.5–5.1)
PROT SERPL-MCNC: 7.6 G/DL (ref 6.4–8.2)
RBC # BLD AUTO: 4.11 M/UL (ref 4.1–5.7)
SODIUM SERPL-SCNC: 136 MMOL/L (ref 136–145)
T4 FREE SERPL-MCNC: 0.8 NG/DL (ref 0.8–1.5)
TSH SERPL DL<=0.05 MIU/L-ACNC: 2.1 UIU/ML (ref 0.36–3.74)
WBC # BLD AUTO: 4.1 K/UL (ref 4.1–11.1)

## 2024-10-17 PROCEDURE — 3074F SYST BP LT 130 MM HG: CPT | Performed by: FAMILY MEDICINE

## 2024-10-17 PROCEDURE — 99213 OFFICE O/P EST LOW 20 MIN: CPT | Performed by: FAMILY MEDICINE

## 2024-10-17 PROCEDURE — 3079F DIAST BP 80-89 MM HG: CPT | Performed by: FAMILY MEDICINE

## 2024-10-17 NOTE — PROGRESS NOTES
Vinod Vu (:  1970) is a 54 y.o. male,Established patient, here for evaluation of the following chief complaint(s):  Medication Check         Assessment & Plan  Sinus tachycardia    Still not sure if pt has svt or if symtoms were related to dehyration and /or ARF. Either way, he feels much better on Toprol and isnt interested at this time in following up with cardiology. Will continue toprol xl 50 mg daily, hold losartan. Recheck in 2 weeks and will repeat bmp at that time.          Elevated serum creatinine              No follow-ups on file.       Subjective   HPI  Took toprol xl at 5 :15 pm yesterday. At 9:20 pm pulse was 114.was starting to feel a little better. Was unable to sleep last night , bp was up to 140/94 and pulse was 88. Thie am, bp was 120/78 with pulse of 96. Eating much better.   Review of Systems       Objective   Physical Exam             An electronic signature was used to authenticate this note.    --Rafy Haddad MD

## 2024-10-17 NOTE — PROGRESS NOTES
Chief Complaint   Patient presents with    Medication Check       \"Have you been to the ER, urgent care clinic since your last visit?  Hospitalized since your last visit?\"    NO    “Have you seen or consulted any other health care providers outside of [facility] since your last visit?”    NO        “Have you had a colorectal cancer screening such as a colonoscopy/FIT/Cologuard?    NO    Date of last Colonoscopy: 2013  No cologuard on file  No FIT/FOBT on file   No flexible sigmoidoscopy on file         Click Here for Release of Records Request       There were no vitals filed for this visit.   Health Maintenance Due   Topic Date Due    Diabetic retinal exam  Never done    Hepatitis B vaccine (1 of 3 - 19+ 3-dose series) Never done    Diabetic foot exam  2020    Pneumococcal 0-64 years Vaccine (2 of 2 - PCV) 2021    Shingles vaccine (1 of 2) Never done    Diabetic Alb to Cr ratio (uACR) test  2023    Colorectal Cancer Screen  2023    Flu vaccine (1) 2024    COVID-19 Vaccine (1 - - season) Never done        The patient, Vinod Vu, identity was verified by name and .

## 2024-10-21 ENCOUNTER — TELEPHONE (OUTPATIENT)
Age: 54
End: 2024-10-21

## 2024-10-21 DIAGNOSIS — R00.0 TACHYCARDIA: Primary | ICD-10-CM

## 2024-10-21 NOTE — TELEPHONE ENCOUNTER
Pc with pt. No further tachycardia. Pt would like to see cardiology for his tachycardia. I think this is reasonable. Will refer Dr. Padilla. To still come by offic e in 2 weeks for repeat LFTs and Cr (had been drinking alcohol on cruise)

## 2024-10-25 ENCOUNTER — OFFICE VISIT (OUTPATIENT)
Age: 54
End: 2024-10-25
Payer: COMMERCIAL

## 2024-10-25 ENCOUNTER — TELEPHONE (OUTPATIENT)
Age: 54
End: 2024-10-25

## 2024-10-25 VITALS
DIASTOLIC BLOOD PRESSURE: 80 MMHG | BODY MASS INDEX: 27.3 KG/M2 | HEART RATE: 72 BPM | WEIGHT: 206 LBS | HEIGHT: 73 IN | OXYGEN SATURATION: 99 % | SYSTOLIC BLOOD PRESSURE: 132 MMHG

## 2024-10-25 DIAGNOSIS — R55 SYNCOPE, UNSPECIFIED SYNCOPE TYPE: Primary | ICD-10-CM

## 2024-10-25 DIAGNOSIS — I20.0 UNSTABLE ANGINA (HCC): ICD-10-CM

## 2024-10-25 DIAGNOSIS — E78.2 MIXED HYPERLIPIDEMIA: ICD-10-CM

## 2024-10-25 DIAGNOSIS — I10 BENIGN ESSENTIAL HTN: ICD-10-CM

## 2024-10-25 DIAGNOSIS — Z82.49 FAMILY HISTORY OF EARLY CAD: ICD-10-CM

## 2024-10-25 PROCEDURE — 3075F SYST BP GE 130 - 139MM HG: CPT | Performed by: INTERNAL MEDICINE

## 2024-10-25 PROCEDURE — 3079F DIAST BP 80-89 MM HG: CPT | Performed by: INTERNAL MEDICINE

## 2024-10-25 PROCEDURE — 99204 OFFICE O/P NEW MOD 45 MIN: CPT | Performed by: INTERNAL MEDICINE

## 2024-10-25 ASSESSMENT — PATIENT HEALTH QUESTIONNAIRE - PHQ9
SUM OF ALL RESPONSES TO PHQ QUESTIONS 1-9: 2
SUM OF ALL RESPONSES TO PHQ QUESTIONS 1-9: 2
SUM OF ALL RESPONSES TO PHQ9 QUESTIONS 1 & 2: 2
2. FEELING DOWN, DEPRESSED OR HOPELESS: NOT AT ALL
SUM OF ALL RESPONSES TO PHQ QUESTIONS 1-9: 0
SUM OF ALL RESPONSES TO PHQ QUESTIONS 1-9: 0
SUM OF ALL RESPONSES TO PHQ QUESTIONS 1-9: 2
DEPRESSION UNABLE TO ASSESS: URGENT/EMERGENT SITUATION
SUM OF ALL RESPONSES TO PHQ QUESTIONS 1-9: 0
2. FEELING DOWN, DEPRESSED OR HOPELESS: SEVERAL DAYS
SUM OF ALL RESPONSES TO PHQ QUESTIONS 1-9: 2
SUM OF ALL RESPONSES TO PHQ QUESTIONS 1-9: 0
1. LITTLE INTEREST OR PLEASURE IN DOING THINGS: NOT AT ALL
SUM OF ALL RESPONSES TO PHQ9 QUESTIONS 1 & 2: 0
1. LITTLE INTEREST OR PLEASURE IN DOING THINGS: SEVERAL DAYS

## 2024-10-25 NOTE — TELEPHONE ENCOUNTER
Enrolled with [employer] - Ordered and being shipped to patient's home address on file.  ETA within 5-7 business days.         Loop  Received: Today  Gabi Wheeler, RN  Alicia Meyers  Please order a 2 week loop for syncope per Dr. Ashley.    Thanks

## 2024-10-25 NOTE — PROGRESS NOTES
Component Value Date/Time    K 3.9 10/16/2024 03:07 PM     No results found for: \"HBA1C\"  Lab Results   Component Value Date/Time    HGB 14.3 10/16/2024 03:07 PM     Lab Results   Component Value Date/Time     10/16/2024 03:07 PM       Reviewed:  Past Medical History:   Diagnosis Date    Abnormal liver enzymes     neg hep b , neg hepc, iron, yuliet    Anxiety     Diabetes mellitus (HCC)     GERD (gastroesophageal reflux disease)     Hypertension     Kidney stone     Psychiatric disorder     anxiety    Vitamin B12 deficiency 2/8/2016     Social History     Tobacco Use   Smoking Status Never   Smokeless Tobacco Current    Types: Chew     Social History     Substance and Sexual Activity   Alcohol Use Yes    Alcohol/week: 2.5 standard drinks of alcohol     Allergies   Allergen Reactions    Hydrochlorothiazide Other (See Comments)     Hyperuricemia/gout  Other reaction(s): Other (see comments)  Hyperuricemia/gout    Hydromorphone Hives    Lisinopril Diarrhea     Patient is taking this medication   Patient is taking this medication     Patient is taking this medication    Glimepiride Nausea And Vomiting       Current Outpatient Medications   Medication Sig    metoprolol succinate (TOPROL XL) 50 MG extended release tablet Take 1 tablet by mouth daily For tachycardia    ALPRAZolam (XANAX) 1 MG tablet Take 1 tablet by mouth 3 times daily as needed for Sleep for up to 90 days. TAKE 1 TABLET BY MOUTH THREE TIMES DAILY AS NEEDED FOR SLEEP OR ANXIETY. MAX DAILY AMOUNT: 3 MG Max Daily Amount: 3 mg    ibuprofen (ADVIL) 200 MG tablet 2 tabs po every 6 hours as needed for pain    amitriptyline (ELAVIL) 50 MG tablet TAKE 1 TABLET BY MOUTH EVERY NIGHT    vitamin D (ERGOCALCIFEROL) 1.25 MG (58482 UT) CAPS capsule TAKE 1 CAPSULE BY MOUTH 1 TIME A WEEK    vitamin B-6 (PYRIDOXINE) 50 MG tablet Take 1 tablet by mouth daily    tamsulosin (FLOMAX) 0.4 MG capsule TAKE 1 CAPSULE BY MOUTH DAILY    atorvastatin (LIPITOR) 20 MG tablet TAKE

## 2024-10-25 NOTE — PATIENT INSTRUCTIONS
Dr. Duglas Chicas    [facility]   [facility]  [address] [place] [address],  [place] [address] [phone]  Fax: [phone]     You will be scheduled for a Nuclear Stress Test after your appointment today.    Nuclear stress testing evaluates blood flow to your heart muscle and assesses cardiac function. There are 2 parts (Rest/Stress) to this procedure and will include either an exercise on a treadmill or an IV administration of a stressing medication called Lexiscan. Your cardiologist will determine which type of testing is best for you. This test can be performed in one day unless it is determined that better quality images will be obtained by performing the test over two days.     *Please arrive 15 minutes prior to your appointment time    Test Duration:    -One day testing will take 4 hours   -Two day testing will take 2 hours each day. Your second day(resting images) will be scheduled after the first day is completed    Day of testing instructions:    NO CAFFEINE (not even decaffeinated products) 24 HOURS PRIOR TO TESTING. This includes coffee, soda, tea, chocolate, multivitamins, and migraine medication, like Excedrin or Fioricet that contains caffeine.  Nothing to eat or drink 4 HOURS prior to testing. Sips of water okay with meds.   NO NICOTINE 12 hours prior to testing   It is recommended you hold Sildenifil 48 prior to your test.   Wear comfortable clothes and shoes (Shirts with no metal, shorts or pants, tennis shoes, no heels or flip flops)    IMPORTANT: This testing involves a cardiac tracer ordered specifically for you. If you are unable to make your appointment, please call to cancel/reschedule AT LEAST 24 hours prior to your appointment so your tracer can be cancelled. [phone].

## 2024-11-01 ENCOUNTER — OFFICE VISIT (OUTPATIENT)
Age: 54
End: 2024-11-01

## 2024-11-01 VITALS
OXYGEN SATURATION: 98 % | RESPIRATION RATE: 16 BRPM | TEMPERATURE: 98 F | HEART RATE: 80 BPM | DIASTOLIC BLOOD PRESSURE: 74 MMHG | SYSTOLIC BLOOD PRESSURE: 119 MMHG

## 2024-11-01 DIAGNOSIS — N18.32 CHRONIC RENAL FAILURE, STAGE 3B (HCC): ICD-10-CM

## 2024-11-01 DIAGNOSIS — Z23 ENCOUNTER FOR IMMUNIZATION: Primary | ICD-10-CM

## 2024-11-01 DIAGNOSIS — Z23 NEEDS FLU SHOT: ICD-10-CM

## 2024-11-01 LAB
ANION GAP SERPL CALC-SCNC: 7 MMOL/L (ref 2–12)
APPEARANCE UR: CLEAR
BACTERIA URNS QL MICRO: ABNORMAL /HPF
BILIRUB UR QL: NEGATIVE
BUN SERPL-MCNC: 18 MG/DL (ref 6–20)
BUN/CREAT SERPL: 12 (ref 12–20)
CALCIUM SERPL-MCNC: 9.6 MG/DL (ref 8.5–10.1)
CHLORIDE SERPL-SCNC: 106 MMOL/L (ref 97–108)
CO2 SERPL-SCNC: 26 MMOL/L (ref 21–32)
COLOR UR: ABNORMAL
CREAT SERPL-MCNC: 1.49 MG/DL (ref 0.7–1.3)
CREAT UR-MCNC: 36 MG/DL
EPITH CASTS URNS QL MICRO: ABNORMAL /LPF
GLUCOSE SERPL-MCNC: 114 MG/DL (ref 65–100)
GLUCOSE UR STRIP.AUTO-MCNC: NEGATIVE MG/DL
HGB UR QL STRIP: NEGATIVE
HYALINE CASTS URNS QL MICRO: ABNORMAL /LPF (ref 0–5)
KETONES UR QL STRIP.AUTO: NEGATIVE MG/DL
LEUKOCYTE ESTERASE UR QL STRIP.AUTO: ABNORMAL
MICROALBUMIN UR-MCNC: <0.5 MG/DL
MICROALBUMIN/CREAT UR-RTO: <14 MG/G (ref 0–30)
NITRITE UR QL STRIP.AUTO: NEGATIVE
PH UR STRIP: 5 (ref 5–8)
POTASSIUM SERPL-SCNC: 5.1 MMOL/L (ref 3.5–5.1)
PROT UR STRIP-MCNC: NEGATIVE MG/DL
RBC #/AREA URNS HPF: ABNORMAL /HPF (ref 0–5)
SODIUM SERPL-SCNC: 139 MMOL/L (ref 136–145)
SP GR UR REFRACTOMETRY: 1.01 (ref 1–1.03)
SPECIMEN HOLD: NORMAL
UROBILINOGEN UR QL STRIP.AUTO: 0.2 EU/DL (ref 0.2–1)
WBC URNS QL MICRO: ABNORMAL /HPF (ref 0–4)

## 2024-11-01 NOTE — PROGRESS NOTES
Chief Complaint   Patient presents with    Follow-up       \"Have you been to the ER, urgent care clinic since your last visit?  Hospitalized since your last visit?\"    NO    “Have you seen or consulted any other health care providers outside of [facility] since your last visit?”    NO        “Have you had a colorectal cancer screening such as a colonoscopy/FIT/Cologuard?    NO    Date of last Colonoscopy: 2013  No cologuard on file  No FIT/FOBT on file   No flexible sigmoidoscopy on file         Click Here for Release of Records Request       Vitals:    24 1128   BP: 119/74   Pulse: 80   Resp: 16   Temp: 98 °F (36.7 °C)   SpO2: 98%      Health Maintenance Due   Topic Date Due    Diabetic retinal exam  Never done    Hepatitis B vaccine (1 of 3 - 19+ 3-dose series) Never done    Diabetic foot exam  2020    Pneumococcal 0-64 years Vaccine (2 of 2 - PCV) 2021    Diabetic Alb to Cr ratio (uACR) test  2023    Colorectal Cancer Screen  2023      VERBAL ORDER FROM DR. SRIVASTAVA FOR FLU VACCINE LEFT DELTIOID AND SHINGRIX #1 RIGHT DELTOID IM.    The patient, Vinod Vu, identity was verified by name and .

## 2024-11-01 NOTE — PROGRESS NOTES
Vinod Vu (:  1970) is a 54 y.o. male,Established patient, here for evaluation of the following chief complaint(s):  Follow-up         Assessment & Plan  Encounter for immunization       Orders:    Zoster, SHINGRIX, (18 yrs +), IM    Needs flu shot       Orders:    Influenza, FLUCELVAX Trivalent, (age 6 mo+) IM, Preservative Free, 0.5mL    Chronic renal failure, stage 3b (HCC)       Orders:    Basic Metabolic Panel; Future    Urinalysis with Microscopic; Future    Microalbumin / Creatinine Urine Ratio; Future      No follow-ups on file.       Subjective   HPI Pt in for followup of tachycardia and renal function. Saw cardiology Dr. Ashley, and is going for a [employer], event monitor and an echol. Father had an MI and several of his brothers  of MI in their 60s. NO further episodes of palpitations. No lightheadedness, weakness, dizziness, chest tightness, dyspnea. Has been going to work. Had been drinking heavily on his cruise. Went with daughter, who had just graduated from [employer].     Review of Systems       Objective   Physical Exam  Cardiovascular:      Rate and Rhythm: Normal rate and regular rhythm.      Heart sounds: Normal heart sounds. No murmur heard.  Pulmonary:      Effort: Pulmonary effort is normal.      Breath sounds: Normal breath sounds.   Abdominal:      General: Abdomen is flat. Bowel sounds are normal.      Palpations: Abdomen is soft.   Musculoskeletal:      Right lower leg: No edema.      Left lower leg: No edema.                  An electronic signature was used to authenticate this note.    --Rafy Haddad MD

## 2024-11-05 ENCOUNTER — TELEPHONE (OUTPATIENT)
Age: 54
End: 2024-11-05

## 2024-11-12 RX ORDER — METOPROLOL SUCCINATE 50 MG/1
TABLET, EXTENDED RELEASE ORAL
Qty: 90 TABLET | Refills: 1 | Status: SHIPPED | OUTPATIENT
Start: 2024-11-12

## 2024-11-19 ENCOUNTER — TELEPHONE (OUTPATIENT)
Age: 54
End: 2024-11-19

## 2024-11-19 ENCOUNTER — OFFICE VISIT (OUTPATIENT)
Age: 54
End: 2024-11-19
Payer: COMMERCIAL

## 2024-11-19 VITALS
SYSTOLIC BLOOD PRESSURE: 138 MMHG | BODY MASS INDEX: 27.3 KG/M2 | HEIGHT: 73 IN | OXYGEN SATURATION: 99 % | DIASTOLIC BLOOD PRESSURE: 74 MMHG | HEART RATE: 91 BPM | WEIGHT: 206 LBS

## 2024-11-19 DIAGNOSIS — E11.9 CONTROLLED TYPE 2 DIABETES MELLITUS WITHOUT COMPLICATION, WITHOUT LONG-TERM CURRENT USE OF INSULIN (HCC): ICD-10-CM

## 2024-11-19 DIAGNOSIS — I10 BENIGN ESSENTIAL HTN: ICD-10-CM

## 2024-11-19 DIAGNOSIS — I20.0 UNSTABLE ANGINA (HCC): Primary | ICD-10-CM

## 2024-11-19 DIAGNOSIS — R94.39 ABNORMAL STRESS TEST: ICD-10-CM

## 2024-11-19 DIAGNOSIS — E78.2 MIXED HYPERLIPIDEMIA: ICD-10-CM

## 2024-11-19 DIAGNOSIS — Z82.49 FAMILY HISTORY OF EARLY CAD: ICD-10-CM

## 2024-11-19 LAB
ANION GAP SERPL CALC-SCNC: 9 MMOL/L (ref 2–12)
BUN SERPL-MCNC: 20 MG/DL (ref 6–20)
BUN/CREAT SERPL: 12 (ref 12–20)
CALCIUM SERPL-MCNC: 9.1 MG/DL (ref 8.5–10.1)
CHLORIDE SERPL-SCNC: 105 MMOL/L (ref 97–108)
CO2 SERPL-SCNC: 24 MMOL/L (ref 21–32)
CREAT SERPL-MCNC: 1.64 MG/DL (ref 0.7–1.3)
ERYTHROCYTE [DISTWIDTH] IN BLOOD BY AUTOMATED COUNT: 13.3 % (ref 11.5–14.5)
GLUCOSE SERPL-MCNC: 140 MG/DL (ref 65–100)
HCT VFR BLD AUTO: 41.5 % (ref 36.6–50.3)
HGB BLD-MCNC: 14.3 G/DL (ref 12.1–17)
MCH RBC QN AUTO: 34.5 PG (ref 26–34)
MCHC RBC AUTO-ENTMCNC: 34.5 G/DL (ref 30–36.5)
MCV RBC AUTO: 100.2 FL (ref 80–99)
NRBC # BLD: 0 K/UL (ref 0–0.01)
NRBC BLD-RTO: 0 PER 100 WBC
PLATELET # BLD AUTO: 160 K/UL (ref 150–400)
PMV BLD AUTO: 11 FL (ref 8.9–12.9)
POTASSIUM SERPL-SCNC: 4.2 MMOL/L (ref 3.5–5.1)
RBC # BLD AUTO: 4.14 M/UL (ref 4.1–5.7)
SODIUM SERPL-SCNC: 138 MMOL/L (ref 136–145)
WBC # BLD AUTO: 3.9 K/UL (ref 4.1–11.1)

## 2024-11-19 PROCEDURE — 3078F DIAST BP <80 MM HG: CPT | Performed by: INTERNAL MEDICINE

## 2024-11-19 PROCEDURE — 3075F SYST BP GE 130 - 139MM HG: CPT | Performed by: INTERNAL MEDICINE

## 2024-11-19 PROCEDURE — 99214 OFFICE O/P EST MOD 30 MIN: CPT | Performed by: INTERNAL MEDICINE

## 2024-11-19 PROCEDURE — 3044F HG A1C LEVEL LT 7.0%: CPT | Performed by: INTERNAL MEDICINE

## 2024-11-19 RX ORDER — ASPIRIN 81 MG/1
81 TABLET ORAL DAILY
Qty: 90 TABLET | Refills: 3 | Status: SHIPPED | OUTPATIENT
Start: 2024-11-19

## 2024-11-19 ASSESSMENT — PATIENT HEALTH QUESTIONNAIRE - PHQ9
SUM OF ALL RESPONSES TO PHQ QUESTIONS 1-9: 0
SUM OF ALL RESPONSES TO PHQ9 QUESTIONS 1 & 2: 0
SUM OF ALL RESPONSES TO PHQ QUESTIONS 1-9: 0
SUM OF ALL RESPONSES TO PHQ QUESTIONS 1-9: 0
2. FEELING DOWN, DEPRESSED OR HOPELESS: NOT AT ALL
1. LITTLE INTEREST OR PLEASURE IN DOING THINGS: NOT AT ALL
SUM OF ALL RESPONSES TO PHQ QUESTIONS 1-9: 0

## 2024-11-19 NOTE — TELEPHONE ENCOUNTER
Attached event to the monitor order under cardiology tab. Please review. Thank you!    Recorded 11/5/24 7:16pm  Received: 11/7/24 11:48am  Report Analysis: Ventricular Tachycardia (10 sec), Sinus Tachycardia  Rate (bpm) 128.0  Patient Symptom: Auto Trigger

## 2024-11-19 NOTE — TELEPHONE ENCOUNTER
----- Message from Dr. Yousuf Ashley MD sent at 11/18/2024  5:32 PM EST -----  See if pt can come in within next week to discuss results of abnormal stress test. thx

## 2024-11-19 NOTE — PROGRESS NOTES
[facility] [place]: Ashley  [phone]    HPI:  Mr. Vu is a 53 yo M with essential hypertension, type 2 diabetes, mixed hyperlipidemia    On last visit, due to unstable angina, syncope, proceeded with a stress test that was abnormal with a moderate to large reversible inferior/inferolateral defect, consistent with ischemia.  He also wore a heart monitor that demonstrated a 10 second run of non-sustained ventricular tachycardia.  From a symptom standpoint he continues to have shortness of breath with exertion.  He is also feeling more easily fatigued and drowsy. No lucero chest pain.  He is compensated on exam with clear lungs and trace lower extremity edema.  Soc hx. No tobacco. 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 Unstable angina.  Stress test was abnormal, suggestive of underlying CAD.  Discussed benefits and risks of cardiac cath and he is agreeable to proceed and will set this up.  He is already on a beta blocker.  Will have him take an aspirin.  2. Syncope.  He did have a brief run of non sustained ventricular tachycardia.  He is already on a betablocker.  Depending on the results of his cardiac cath, will see if we need to involve EP.  3. Essential hypertension.  Blood pressure has been better and no changes made.  4. Mixed hyperlipidemia.  Tolerating statin.  5. Type 2 diabetes.  Adjustments per his primary care.  6. Renal insufficiency.  Did give him contact information for nephrologist.  Will need to minimize contrast and encouraged hydration periprocedural of his cardiac cath.        He  has a past medical history of Abnormal liver enzymes, Anxiety, Diabetes mellitus (HCC), GERD (gastroesophageal reflux disease), Hypertension, Kidney stone, Psychiatric disorder, and Vitamin B12 deficiency.    All other systems negative except as above.     PE  Vitals:    11/19/24 1504   BP: 138/74   Site: Left Upper Arm   Position: Sitting   Cuff Size: Large

## 2024-11-19 NOTE — TELEPHONE ENCOUNTER
Telephone call made to patient. Two patient identifiers verified.   Went over results with patient. Verified understanding. All questions answered.     The patient will come in today.     Future Appointments   Date Time Provider Department Center   11/19/2024  3:40 PM Yousuf Ashley MD [facility] AMB   12/5/2024  9:20 AM Rafy Haddad MD [facility] DEP   12/20/2024  3:45 PM [facility] ECHO 3 [facility] AMB

## 2024-11-20 ENCOUNTER — TELEPHONE (OUTPATIENT)
Age: 54
End: 2024-11-20

## 2024-11-20 ENCOUNTER — PREP FOR PROCEDURE (OUTPATIENT)
Age: 54
End: 2024-11-20

## 2024-11-20 DIAGNOSIS — R94.39 ABNORMAL STRESS TEST: Primary | ICD-10-CM

## 2024-11-20 DIAGNOSIS — I20.0 UNSTABLE ANGINA (HCC): ICD-10-CM

## 2024-11-20 NOTE — TELEPHONE ENCOUNTER
----- Message from Dr. Yousuf Ashley MD sent at 11/20/2024  8:02 AM EST -----  Please let pt know labs were overall unchanged. We know the Cr is a little higher so hydrate well before and after. thx

## 2024-11-20 NOTE — TELEPHONE ENCOUNTER
Spoke to patient and scheduled him for [facility] with Dr. Padilla on 12/4 @ 9:30 am with 7:30 am arrival. Instructions sent to patient via my chart and advised labs already done, advised to hold Metformin the day before the day of and the day after procedure. Patient verbalized understanding and had no questions at the time of call.    Patient identification verified x2.         Patient Instructions    Catheterization   Pre-procedure instructions  Lab work: Already done.  [facility] Draw Sites:  [facility] [place]: [address] [place] [address] [address] [facility] [address]  [place]: [id number] [facility] [address]  [place]: [address] [facility] [address]  [facility]: [address] [facility] [address]  [place]: [address] [facility] [address]  [facility]: [address] [facility] [address]  [place]: [address] [facility] [address]  [facility]: [address] [place] [address]  You may have clear liquids up to 2 hours prior to your procedure and a light meal up to 6 hours prior to your procedure.   Drink ten 8oz. glasses of water for 3-days prior-to and 3-days post cardiac cath.  Stop blood thinners 2 days prior to procedure EXCEPT: Brilinta, Plavix or Aspirin  Medications restrictions:  Hold Metformin the day before the day of and the day after procedure.    Procedure day  Have a  that will bring you and take you home (6-8 hours)  Have a responsible adult that can stay with you for 24 hours  Bring ID and insurance card  Wear comfortable clothing  Leave valuables at home,   Bring: dentures, hearing aids, or glasses  Bring overnight bag (just in case of an overnight stay)  Where to report  [facility]: go through main entrance and to the left is outpatient registration    Date of procedure: 12/4 w/ Dr. Padilla  Arrival Time: 7:30 am    Post procedure instructions  No driving

## 2024-11-26 RX ORDER — SODIUM CHLORIDE 0.9 % (FLUSH) 0.9 %
5-40 SYRINGE (ML) INJECTION PRN
Status: CANCELLED | OUTPATIENT
Start: 2024-11-26

## 2024-11-30 DIAGNOSIS — F43.9 STRESS: ICD-10-CM

## 2024-12-02 RX ORDER — ALPRAZOLAM 1 MG/1
TABLET ORAL
Qty: 90 TABLET | Refills: 1 | Status: SHIPPED | OUTPATIENT
Start: 2024-12-02 | End: 2025-03-02

## 2024-12-03 NOTE — DISCHARGE INSTRUCTIONS
times you go up and down the stairs  Take rests and pace yourself with activity.  Be careful and do not strain with bowel movements.    MEDICATIONS:  Take all medications as prescribed.  Call your physician if you have any questions.  Keep an updated list of your medications with you at all times and give a list to your physician and pharmacist.    SIGNS AND SYMPTOMS:  Be cautions of symptoms of angina or recurrent symptoms such as chest discomfort, unusual shortness of breath or fatigue.  These could be symptoms of restenosis, a new blockage or a heart attack.  If your symptoms are relieved with rest it is still recommended that you notify your physician of recurrent chest pain or discomfort.    For CHEST PAIN or symptoms of angina not relieved with rest:  If the discomfort is not relieved with rest and you have been prescribed Nitroglycerin, take as directed (taken under the tongue, one at a time 5 minutes apart for a total of 3 doses).  If the discomfort is not relieved after the 3rd nitroglycerin, call 911.      AFTER CARE:  Follow up with you physician as instructed.  Follow a heart healthy diet with proper portion control, daily stress management, daily      exercise, blood pressure and cholesterol control, and smoking cessation.      Follow up with your PCP in regards to presumed Gout of your hand.  Colchicine x 1 week.  You may also take NSAID/Ibuprofen 600 mg q6h as needed for pain control.  Encourage elevation above heart and ice for comfort.

## 2024-12-04 ENCOUNTER — HOSPITAL ENCOUNTER (OUTPATIENT)
Facility: HOSPITAL | Age: 54
Discharge: HOME | End: 2024-12-04
Attending: INTERNAL MEDICINE | Admitting: INTERNAL MEDICINE
Payer: COMMERCIAL

## 2024-12-04 VITALS
RESPIRATION RATE: 14 BRPM | HEART RATE: 77 BPM | SYSTOLIC BLOOD PRESSURE: 158 MMHG | TEMPERATURE: 97.9 F | DIASTOLIC BLOOD PRESSURE: 103 MMHG | HEIGHT: 73 IN | OXYGEN SATURATION: 100 % | BODY MASS INDEX: 27.3 KG/M2 | WEIGHT: 206 LBS

## 2024-12-04 DIAGNOSIS — I20.0 UNSTABLE ANGINA (HCC): ICD-10-CM

## 2024-12-04 DIAGNOSIS — R94.39 ABNORMAL STRESS TEST: ICD-10-CM

## 2024-12-04 LAB
ECHO BSA: 2.19 M2
GLUCOSE BLD STRIP.AUTO-MCNC: 121 MG/DL (ref 65–117)
SERVICE CMNT-IMP: ABNORMAL

## 2024-12-04 PROCEDURE — 76937 US GUIDE VASCULAR ACCESS: CPT | Performed by: INTERNAL MEDICINE

## 2024-12-04 PROCEDURE — 2709999900 HC NON-CHARGEABLE SUPPLY: Performed by: INTERNAL MEDICINE

## 2024-12-04 PROCEDURE — 6360000002 HC RX W HCPCS: Performed by: INTERNAL MEDICINE

## 2024-12-04 PROCEDURE — 99153 MOD SED SAME PHYS/QHP EA: CPT | Performed by: INTERNAL MEDICINE

## 2024-12-04 PROCEDURE — 82962 GLUCOSE BLOOD TEST: CPT

## 2024-12-04 PROCEDURE — C1769 GUIDE WIRE: HCPCS | Performed by: INTERNAL MEDICINE

## 2024-12-04 PROCEDURE — C1894 INTRO/SHEATH, NON-LASER: HCPCS | Performed by: INTERNAL MEDICINE

## 2024-12-04 PROCEDURE — 6370000000 HC RX 637 (ALT 250 FOR IP): Performed by: NURSE PRACTITIONER

## 2024-12-04 PROCEDURE — 99152 MOD SED SAME PHYS/QHP 5/>YRS: CPT | Performed by: INTERNAL MEDICINE

## 2024-12-04 PROCEDURE — 93571 IV DOP VEL&/PRESS C FLO 1ST: CPT | Performed by: INTERNAL MEDICINE

## 2024-12-04 PROCEDURE — 2500000003 HC RX 250 WO HCPCS: Performed by: INTERNAL MEDICINE

## 2024-12-04 PROCEDURE — 6360000004 HC RX CONTRAST MEDICATION: Performed by: INTERNAL MEDICINE

## 2024-12-04 PROCEDURE — 93452 LEFT HRT CATH W/VENTRCLGRPHY: CPT | Performed by: INTERNAL MEDICINE

## 2024-12-04 PROCEDURE — C1713 ANCHOR/SCREW BN/BN,TIS/BN: HCPCS | Performed by: INTERNAL MEDICINE

## 2024-12-04 PROCEDURE — C1887 CATHETER, GUIDING: HCPCS | Performed by: INTERNAL MEDICINE

## 2024-12-04 RX ORDER — SODIUM CHLORIDE 9 MG/ML
INJECTION, SOLUTION INTRAVENOUS PRN
Status: DISCONTINUED | OUTPATIENT
Start: 2024-12-04 | End: 2024-12-04 | Stop reason: HOSPADM

## 2024-12-04 RX ORDER — FENTANYL CITRATE 50 UG/ML
INJECTION, SOLUTION INTRAMUSCULAR; INTRAVENOUS PRN
Status: DISCONTINUED | OUTPATIENT
Start: 2024-12-04 | End: 2024-12-04 | Stop reason: HOSPADM

## 2024-12-04 RX ORDER — MIDAZOLAM HYDROCHLORIDE 1 MG/ML
INJECTION, SOLUTION INTRAMUSCULAR; INTRAVENOUS PRN
Status: DISCONTINUED | OUTPATIENT
Start: 2024-12-04 | End: 2024-12-04 | Stop reason: HOSPADM

## 2024-12-04 RX ORDER — SODIUM CHLORIDE 9 MG/ML
INJECTION, SOLUTION INTRAVENOUS CONTINUOUS
Status: DISCONTINUED | OUTPATIENT
Start: 2024-12-04 | End: 2024-12-04 | Stop reason: HOSPADM

## 2024-12-04 RX ORDER — VERAPAMIL HYDROCHLORIDE 2.5 MG/ML
INJECTION, SOLUTION INTRAVENOUS PRN
Status: DISCONTINUED | OUTPATIENT
Start: 2024-12-04 | End: 2024-12-04 | Stop reason: HOSPADM

## 2024-12-04 RX ORDER — SODIUM CHLORIDE 0.9 % (FLUSH) 0.9 %
5-40 SYRINGE (ML) INJECTION EVERY 12 HOURS SCHEDULED
Status: DISCONTINUED | OUTPATIENT
Start: 2024-12-04 | End: 2024-12-04 | Stop reason: HOSPADM

## 2024-12-04 RX ORDER — SODIUM CHLORIDE 0.9 % (FLUSH) 0.9 %
5-40 SYRINGE (ML) INJECTION PRN
Status: DISCONTINUED | OUTPATIENT
Start: 2024-12-04 | End: 2024-12-04 | Stop reason: HOSPADM

## 2024-12-04 RX ORDER — ACETAMINOPHEN 325 MG/1
650 TABLET ORAL EVERY 4 HOURS PRN
Status: DISCONTINUED | OUTPATIENT
Start: 2024-12-04 | End: 2024-12-04 | Stop reason: HOSPADM

## 2024-12-04 RX ORDER — HEPARIN SODIUM 1000 [USP'U]/ML
INJECTION, SOLUTION INTRAVENOUS; SUBCUTANEOUS PRN
Status: DISCONTINUED | OUTPATIENT
Start: 2024-12-04 | End: 2024-12-04 | Stop reason: HOSPADM

## 2024-12-04 RX ORDER — COLCHICINE 0.6 MG/1
1.2 TABLET ORAL ONCE
Status: COMPLETED | OUTPATIENT
Start: 2024-12-04 | End: 2024-12-04

## 2024-12-04 RX ORDER — IOPAMIDOL 755 MG/ML
INJECTION, SOLUTION INTRAVASCULAR PRN
Status: DISCONTINUED | OUTPATIENT
Start: 2024-12-04 | End: 2024-12-04 | Stop reason: HOSPADM

## 2024-12-04 RX ORDER — LIDOCAINE HYDROCHLORIDE 10 MG/ML
INJECTION, SOLUTION INFILTRATION; PERINEURAL PRN
Status: DISCONTINUED | OUTPATIENT
Start: 2024-12-04 | End: 2024-12-04 | Stop reason: HOSPADM

## 2024-12-04 RX ORDER — COLCHICINE 0.6 MG/1
0.6 TABLET ORAL DAILY
Qty: 7 TABLET | Refills: 0 | Status: SHIPPED | OUTPATIENT
Start: 2024-12-04 | End: 2024-12-11

## 2024-12-04 RX ADMIN — COLCHICINE 1.2 MG: 0.6 TABLET, FILM COATED ORAL at 14:27

## 2024-12-04 ASSESSMENT — PAIN - FUNCTIONAL ASSESSMENT: PAIN_FUNCTIONAL_ASSESSMENT: NONE - DENIES PAIN

## 2024-12-04 NOTE — PROGRESS NOTES
CATH LAB to RECOVERY ROOM REPORT    Procedure: [facility] with IFR of RCA    MD: POLLY Padilla MD    The procedure was diagnostic only.    Verbal Report given to Recovery Nurse on patient being transferred to Cardiac Cath Lab RR for routine post-op. Patient stable upon transfer to .  Vitals, mental status, MAR, procedural summary discussed with recovery RN.    Medication given during procedure:    Contrast:64mL                          Heparin:9500units     Versed:5mg                                                               Fentanyl:100mcg                                                             Sheaths:    Right radial sheath pulled at 1129 , band at 11mL of air

## 2024-12-04 NOTE — PROGRESS NOTES
Pt with chronic gout and requesting medication for swollen left hand . Dr Padilla aware pre procedure. Marie NP in to see Pt. Pt to be prescribed colchicine per YESICA Morales NP.

## 2024-12-04 NOTE — PROGRESS NOTES
Ambulated patient to the bathroom with a steady gait, voided without difficulty. Patient denies chest pain, shortness of breath, or dizziness. Returned to stretcher. Vital signs stable.     Procedural site is clean, dry, and intact. No bleeding, no hematoma.     Assisted patient in dressing/Patient dressed self.     Educated patient about their sedation precautions such as not driving, operating any machinery, or signing legal documents 24 hours post procedure.     Reviewed discharge instructions, including medications and site care using the teach back method.  Answered all questions. Verbalized understanding.     Removed peripheral IV.    Escorted to discharge area in a wheelchair with all of their belongings including Cell phone and discharge instructions.    Patient's daughter present to take patient home. Reviewed discharge instructions with patients' daughter, they verbalized understanding.

## 2024-12-04 NOTE — PROGRESS NOTES
Cardiac Cath Lab Recovery Arrival Note:      Vinod Vu arrived to Cardiac Cath Lab, Recovery Area. Staff introduced to patient. Patient identifiers verified with NAME and DATE OF BIRTH. Procedure verified with patient. Consent forms reviewed and signed by patient or authorized representative and verified. Allergies verified.     Patient and family oriented to department. Patient and family informed of procedure and plan of care.     Questions answered with review. Patient prepped for procedure, per orders from physician, prior to arrival.    Patient on cardiac monitor, non-invasive blood pressure, SPO2 monitor. On RA. Patient is A&Ox 4. Patient reports no complaints.     Patient in stretcher, in low position, with side rails up, call bell within reach, patient instructed to call if assistance as needed.    Patient prep in: CCL , [place] FT 4.     Family in: waiting area.

## 2024-12-04 NOTE — PROGRESS NOTES
TRANSFER - IN REPORT:    Verbal report received from Albino CASSIDY on Vinod Vu  being received from procedure area for routine progression of patient care. Report consisted of patient’s Situation, Background, Assessment and Recommendations(SBAR). Information from the following report(s)SBAR, Procedure Summary, MAR, Recent Results, and Cardiac Rhythm NSR  was reviewed with the receiving clinician. Opportunity for questions and clarification was provided. Assessment completed upon patient’s arrival to Cardiac Cath Lab RECOVERY AREA and care assumed.       Cardiac Cath Lab Recovery Arrival Note:    Vinod Vu arrived to CCL recovery area.  Patient procedure= LHC. Patient on cardiac monitor, non-invasive blood pressure, SPO2 monitor. On Room Air .  IV  of NS on pump at 50 ml/hr. Patient status doing well without problems. Patient is A&Ox 4. Patient reports No Pain.    PROCEDURE SITE CHECK:    Procedure site:without any bleeding and No Hematoma, No pain/discomfort reported at procedure site.     No change in patient status. Continue to monitor patient and status.

## 2024-12-04 NOTE — PROGRESS NOTES
Pt sitting up tolerating food tray well; no complaints.    Pt's daughter /Rosette updated on Pt's post procedure and discharge status.

## 2024-12-05 ENCOUNTER — TELEPHONE (OUTPATIENT)
Age: 54
End: 2024-12-05

## 2024-12-05 NOTE — TELEPHONE ENCOUNTER
Telephone call made to patient. Two patient identifiers verified.   Patient asked about meds. I let him know he should take everything except restart metformin tomorrow. Verified understanding. Scheduled follow ups.     Future Appointments   Date Time Provider Department Center   12/20/2024  3:45 PM [facility] BLOCK ECHO 3 JUNITO SERRANO AMB   1/21/2025  3:40 PM Yousuf Ashley MD [facility] AM[facility]   1/28/2025  1:00 PM Mayra Mckay MD [facility]

## 2024-12-05 NOTE — TELEPHONE ENCOUNTER
----- Message from Dr. Yousuf Ashley MD sent at 12/4/2024 11:40 PM EST -----  Thx Jose Ashley, can you please set up pt follow up with me in 1 month? Also given he didn't have significant CAD but VT on his monitor, would also have him see EP; please set up that appt as well. We did talk about possible recommendation to see EP at our office visit. thx  ----- Message -----  From: Rafy Padilla MD  Sent: 12/4/2024   8:10 PM EST  To: Yousuf Ashley MD

## 2024-12-05 NOTE — TELEPHONE ENCOUNTER
Identified patient 2 identifiers verified.  Patient  is needing  a follow up appointment from Cardiac catheterization   patient was taken off all of  medications. Dr. Arriaga  instructed patient to be seen within  3 days. Procedure was done 12/4/24. Patient can be reached at  [phone].

## 2024-12-06 ENCOUNTER — OFFICE VISIT (OUTPATIENT)
Age: 54
End: 2024-12-06

## 2024-12-06 VITALS
RESPIRATION RATE: 16 BRPM | BODY MASS INDEX: 27.73 KG/M2 | HEART RATE: 79 BPM | DIASTOLIC BLOOD PRESSURE: 92 MMHG | WEIGHT: 209.2 LBS | TEMPERATURE: 95.9 F | HEIGHT: 73 IN | SYSTOLIC BLOOD PRESSURE: 115 MMHG | OXYGEN SATURATION: 98 %

## 2024-12-06 DIAGNOSIS — I25.10 CORONARY ARTERY DISEASE INVOLVING NATIVE CORONARY ARTERY OF NATIVE HEART WITHOUT ANGINA PECTORIS: ICD-10-CM

## 2024-12-06 DIAGNOSIS — E11.9 TYPE 2 DIABETES MELLITUS WITHOUT COMPLICATION, WITHOUT LONG-TERM CURRENT USE OF INSULIN (HCC): ICD-10-CM

## 2024-12-06 DIAGNOSIS — M10.031 ACUTE IDIOPATHIC GOUT OF RIGHT WRIST: Primary | ICD-10-CM

## 2024-12-06 PROBLEM — E11.22 TYPE 2 DIABETES MELLITUS WITH CHRONIC KIDNEY DISEASE (HCC): Status: ACTIVE | Noted: 2024-12-06

## 2024-12-06 RX ORDER — ATORVASTATIN CALCIUM 80 MG/1
TABLET, FILM COATED ORAL
Qty: 90 TABLET | Refills: 3 | Status: SHIPPED | OUTPATIENT
Start: 2024-12-06

## 2024-12-06 RX ORDER — PREDNISONE 20 MG/1
20 TABLET ORAL 2 TIMES DAILY
Qty: 10 TABLET | Refills: 0 | Status: SHIPPED | OUTPATIENT
Start: 2024-12-06 | End: 2024-12-11

## 2024-12-06 RX ORDER — AMITRIPTYLINE HYDROCHLORIDE 50 MG/1
50 TABLET ORAL NIGHTLY
Qty: 90 TABLET | Refills: 3 | Status: SHIPPED | OUTPATIENT
Start: 2024-12-06

## 2024-12-06 RX ORDER — TAMSULOSIN HYDROCHLORIDE 0.4 MG/1
0.4 CAPSULE ORAL DAILY
Qty: 90 CAPSULE | Refills: 3 | Status: SHIPPED | OUTPATIENT
Start: 2024-12-06

## 2024-12-06 NOTE — ASSESSMENT & PLAN NOTE
Orders:    Hemoglobin A1C; Future    Hemoglobin A1C    Basic Metabolic Panel; Future    Basic Metabolic Panel

## 2024-12-06 NOTE — PROGRESS NOTES
Vinod Vu (:  1970) is a 54 y.o. male,Established patient, here for evaluation of the following chief complaint(s):  Follow-up and Arm Pain (LEFT ARM WITH SWELLING)         Assessment & Plan  Acute idiopathic gout of right wrist       Orders:    predniSONE (DELTASONE) 20 MG tablet; Take 1 tablet by mouth 2 times daily for 5 days For gout    Uric Acid; Future    Uric Acid    Type 2 diabetes mellitus without complication, without long-term current use of insulin ([facility])       Orders:    Hemoglobin A1C; Future    Hemoglobin A1C    Basic Metabolic Panel; Future    Basic Metabolic Panel    Coronary artery disease involving native coronary artery of native heart without angina pectoris     Will increase atorvastatin from 20 mg daily to 80 mg daily. Will also change metformin to ozempic.   Orders:    atorvastatin (LIPITOR) 80 MG tablet; 1/2 tab po daily for 2 weeks, then one tab po daily. For cholesterol.    Semaglutide,0.25 or 0.5MG/DOS, 2 MG/3ML SOPN; Inject 0.25 mg into the skin every 7 days    Meds reconciled. Recheck in 2 months.   No follow-ups on file.       Subjective   HPIin for hospital followup.(JOSE ENRIQUE visit).Was discharged from [place] 2 days ago after having a cardiac cath showing a 50% stenosis in proximal RCA. Had been having problems with L wrist pain and swelling in this L wrist prior to cath. Was thought to have the gout, and started on colcicine while in the hospital. Had this wrist operated on in . Pain in this wrist is at site of piror surgery. Has appt with a hand surgery next Thursday.  Has a questionable hx of gout. Pt also has a hx of diabetes, on metformion 500 mg 1/2 tab po bid.     Review of Systems       Objective   Physical Exam  Cardiovascular:      Rate and Rhythm: Normal rate and regular rhythm.      Heart sounds: Normal heart sounds. No murmur heard.  Pulmonary:      Effort: Pulmonary effort is normal.      Breath sounds: Normal breath sounds.   Abdominal:      General:

## 2024-12-06 NOTE — PROGRESS NOTES
Chief Complaint   Patient presents with    Follow-up    Arm Pain     LEFT ARM WITH SWELLING       \"Have you been to the ER, urgent care clinic since your last visit?  Hospitalized since your last visit?\"      24- [facility]    “Have you seen or consulted any other health care providers outside of [facility] since your last visit?”      no        “Have you had a colorectal cancer screening such as a colonoscopy/FIT/Cologuard?    NO    Date of last Colonoscopy: 2013  No cologuard on file  No FIT/FOBT on file   No flexible sigmoidoscopy on file         Click Here for Release of Records Request       Vitals:    24 1228   BP: (!) 115/92   Pulse: 79   Resp: 16   Temp: (!) 95.9 °F (35.5 °C)   SpO2: 98%      Health Maintenance Due   Topic Date Due    Diabetic retinal exam  Never done    Hepatitis B vaccine (1 of 3 - 19+ 3-dose series) Never done    Diabetic foot exam  2020    Pneumococcal 0-64 years Vaccine (2 of 2 - PCV) 2021    Colorectal Cancer Screen  2023        The patient, Vinod Vu, identity was verified by name and .

## 2024-12-07 LAB
ANION GAP SERPL CALC-SCNC: 4 MMOL/L (ref 2–12)
BUN SERPL-MCNC: 19 MG/DL (ref 6–20)
BUN/CREAT SERPL: 15 (ref 12–20)
CALCIUM SERPL-MCNC: 9.7 MG/DL (ref 8.5–10.1)
CHLORIDE SERPL-SCNC: 104 MMOL/L (ref 97–108)
CO2 SERPL-SCNC: 28 MMOL/L (ref 21–32)
CREAT SERPL-MCNC: 1.24 MG/DL (ref 0.7–1.3)
EST. AVERAGE GLUCOSE BLD GHB EST-MCNC: 134 MG/DL
GLUCOSE SERPL-MCNC: 137 MG/DL (ref 65–100)
HBA1C MFR BLD: 6.3 % (ref 4–5.6)
POTASSIUM SERPL-SCNC: 4.6 MMOL/L (ref 3.5–5.1)
SODIUM SERPL-SCNC: 136 MMOL/L (ref 136–145)
URATE SERPL-MCNC: 5.3 MG/DL (ref 3.5–7.2)

## 2024-12-09 ENCOUNTER — TELEPHONE (OUTPATIENT)
Age: 54
End: 2024-12-09

## 2024-12-09 NOTE — TELEPHONE ENCOUNTER
"Name: Alyssa John  [facility] Number: [id number]  Date of Treatment: 08/24/2018   Diagnosis:   Encounter Diagnoses   Name Primary?    Balance problem     Multiple sclerosis        Time in: 1112  Time Out: 1155  Total Treatment Time: 43      Subjective:    Alyssa reports "i'm doing better"  Pt arrived in w/c.  Patient reports their pain to be 0/10 on a 0-10 scale with 0 being no pain and 10 being the worst pain imaginable.    Objective    Patient received individual therapy to increase strength and flexibility with activities as follows:     Alyssa received therapeutic exercises to develop strength and flexibility for 43 minutes including:     Patient participated in dynamic functional therapeutic activities to improve functional performance for 43 minutes. Including:     Bike x 15 minutes  Shuttle 37# x 5 minutes, one rest break  LAQ x 30 B  Seated marches x 20 B  Ball squeeze x 30  Adjusted sitting posture with mirror for biofeedback.  Pt sitting on L butt cheek,       Written Home Exercises Provided: cont HEP  Pt demo good understanding of the education provided. Alyssa demonstrated fair return demonstration of activities.     Assessment:     Edema remains B LE's.  Pt able to ambulate with SC and cga around therapy gym.  Pt will continue to benefit from skilled PT intervention. Medical Necessity is demonstrated by:  Fall Risk, Unable to participate fully in daily activities, Requires skilled supervision to complete and progress HEP and Weakness.    Patient is making fair progress towards established goals.      Plan:  Continue with established Plan of Care towards PT goals.   " Patient requested a call back because he wanted to know was the echo necessary because he doesn't want to pay another $700.00 bill       Contact Information  [phone] (Home Phone)  [phone] (Mobile

## 2024-12-09 NOTE — TELEPHONE ENCOUNTER
Continued Stay Review    Date:  2-10-18    Vital Signs:    Temp (24hrs), Av 2 °F (37 3 °C), Min:98 6 °F (37 °C), Max:99 9 °F (37 7 °C)     HR:  [] 85  Resp:  [20] 20  BP: (158-186)/(81-96) 158/88  SpO2:  [92 %-94 %] 92 %  Body mass index is 30 14 kg/m²  Medications:     Current Facility-Administered Medications:  acetaminophen 975 mg Oral Q8H   albuterol 2 puff Inhalation Q4H PRN   ALPRAZolam 0 25 mg Oral TID PRN   bisacodyl 10 mg Rectal Once   cholecalciferol 2,000 Units Oral Daily   dextrose 5 % and sodium chloride 0 45 % with KCl 20 mEq/L 50 mL/hr Intravenous Continuous   divalproex sodium 500 mg Oral Q8H ALEX   enoxaparin 40 mg Subcutaneous Q24H ALEX   fluticasone-salmeterol 1 puff Inhalation Q12H ALEX   HYDROmorphone 1 mg Intravenous Q2H PRN   lamoTRIgine 200 mg Oral BID   oxyCODONE 10 mg Oral Q4H PRN   oxyCODONE 5 mg Oral Q4H PRN         Abnormal Labs/Diagnostic Results:      He exhibits distension  There is tenderness  Lab Units 02/10/18  0542   18  0542   WBC Thousand/uL 14 20*  < > 11 38*         Age/Sex: 44 y o  male     Assessment/Plan:     Constipation   Assessment & Plan     · According to my discussion with the patient and patient's family, no bowel movements yet for about 3 to 4 days now  · Neurosurgery ordered for a suppository laxative  · For further workup and management if no improve improvement  · General surgery on board  Status post open laparotomy with lysis of adhesions           Leukocytosis   Assessment & Plan     · Worsening today  · Unclear etiology  · May be reactive in nature with patient's surgery and  shunt malfunction  · CT with no pneumonia  UA negative  CSF preliminarily with no growth  · He has been afebrile  · CBC in the morning          Left-sided back pain   Assessment & Plan     · Currently resolved  Appears has been chronic in nature  Unclear etiology  ? Consider related to one of his left-sided shunts?  Patient underwent open laparotomy with Telephone call made to patient. Two patient identifiers verified.   Let the patient know that per Dr. Ashley it is important that he gets an echo done. Verified understanding. He will call his insurance to see pricing and if it would matter to push it back or keep it where it is. He said his insurance renews in November.    lysis of adhesions and reposition of  shunt tubing yesterday  · CT chest with no pneumonia/effusion  No acute osseous abnormality  The patient denies any dysuria           Shunt malfunction   Assessment & Plan     · Please see management under  shunt          Acute headache   Assessment & Plan     · Resolved  · Please see management under  shunt           S/P  shunt   Assessment & Plan     · Suspect malfunctioning was causing HA  · History of congenital hydrocephalus, s/p  shunt with multiple revisions and replacements; neurosurgery performed shunt tap 2 /7/18- CSF with no bacteria/polys/growth  · Neurosurgery and neurology following  · S/p open laparotomy with lysis of adhesions and reposition of CP shunt tubing on 2/8/18  · Shunt series XR with intact  shunt catheter  · Non obstructive bowel gas pattern on yesterday's abdominal x-ray  · For CT scan of the head today as per neurosurgeon  · Neurosurgery on board          Seizure disorder [facility])   Assessment & Plan     · No seizure episodes  · Repeat valproic acid level on 2/15/18  · Valproic acid level was 92  Neurology decreased dose to 500mg Q8hr  · Neurology on board          Tremor   Assessment & Plan     · No tremors when I saw the patient today  · Valproic level 92 - neurology decreased dose to 500mg Q8hr  · Lamictal level 16 0 - WNL             Cerebral palsy (HCC)   Assessment & Plan     · With a history of hydrocephalus, s/p  shunt with multiple revisions and replacements   · Continue supportive care  · Neurosurgery on board           * Headache   Assessment & Plan     · Currently resolved  · Cause likely secondary to shunt malfunction  · Patient underwent open laparotomy with lysis of adhesions and repositioning of  shunt tubing yesterday with general surgery and neurosurgery          NEURO SURGERY   Assessment:  1  POD#2  s/p laparoscopy, lysis of adhesions, open laparotomy, repositioning of distal  shunt t ubing  2   Suspected distal shunt dysfunction s/p left sided  shunt insertion  3  Coiled upper epigastric intraperitoneal distal shunt tubing  4  History of 7 shunt revisions last revision 10/23/17  5  Stable ventricular size  6  Seizure disorder  7  Cerebral palsy  8   S/p  shunt tap left reservoir of codman programmable valve system with no proximal obstruction and clear CSF        Plan:     -Moderate pain  - a bit distended  - dulcolax suppository   - imaging complete- shunt intact        Discharge Plan:  TO BE DETERMINED

## 2024-12-10 ENCOUNTER — TELEPHONE (OUTPATIENT)
Age: 54
End: 2024-12-10

## 2024-12-10 NOTE — TELEPHONE ENCOUNTER
Patient is calling because he is schedule to have an Ablation on 1/28/25 and he would like to see if he can have this procedure done this year instead of next year because of his insurance.Please give a call back if he can     Patient ask that we leave a message on his phone just in case he is not available.    [phone]    [phone]

## 2024-12-11 NOTE — TELEPHONE ENCOUNTER
Called and spoke to Pt he wanted to move his appt with Dr. Mckay up sooner then 1/28/25 let pt know that we do not have anything sooner at this time.

## 2024-12-17 ENCOUNTER — CLINICAL DOCUMENTATION (OUTPATIENT)
Age: 54
End: 2024-12-17

## 2024-12-20 NOTE — TELEPHONE ENCOUNTER
Last appointment: 12/6/24  Next appointment: 3/6/25  Previous refill encounter(s): 10/23/23    Requested Prescriptions     Pending Prescriptions Disp Refills    metFORMIN (GLUCOPHAGE) 500 MG tablet [Pharmacy Med Name: METFORMIN 500MG TABLETS] 90 tablet 3     Sig: TAKE 1/2 TABLET BY MOUTH TWICE DAILY WITH MEALS FOR DIABETES         For Pharmacy Admin Tracking Only    Program: Medication Refill  CPA in place:    Recommendation Provided To:   Intervention Detail: New Rx: 1, reason: Patient Preference  Intervention Accepted By:   Gap Closed?:    Time Spent (min): 5

## 2024-12-23 ENCOUNTER — TELEPHONE (OUTPATIENT)
Age: 54
End: 2024-12-23

## 2024-12-23 NOTE — TELEPHONE ENCOUNTER
----- Message from Dr. Yousuf Ashley MD sent at 12/20/2024  5:02 PM EST -----  Please let pt know echo was overall normal. thx

## 2024-12-23 NOTE — TELEPHONE ENCOUNTER
Patient states that he need a prior auth on   Semaglutide,0.25 or 0.5MG/DOS, 2 MG/3ML SOPN he can be reached @ [phone]

## 2025-01-21 ENCOUNTER — OFFICE VISIT (OUTPATIENT)
Age: 55
End: 2025-01-21
Payer: COMMERCIAL

## 2025-01-21 VITALS
HEIGHT: 73 IN | WEIGHT: 214 LBS | OXYGEN SATURATION: 98 % | DIASTOLIC BLOOD PRESSURE: 82 MMHG | HEART RATE: 71 BPM | BODY MASS INDEX: 28.36 KG/M2 | SYSTOLIC BLOOD PRESSURE: 142 MMHG

## 2025-01-21 DIAGNOSIS — E78.2 MIXED HYPERLIPIDEMIA: ICD-10-CM

## 2025-01-21 DIAGNOSIS — I10 BENIGN ESSENTIAL HTN: ICD-10-CM

## 2025-01-21 DIAGNOSIS — R55 SYNCOPE, UNSPECIFIED SYNCOPE TYPE: Primary | ICD-10-CM

## 2025-01-21 DIAGNOSIS — I47.29 NSVT (NONSUSTAINED VENTRICULAR TACHYCARDIA) (HCC): ICD-10-CM

## 2025-01-21 DIAGNOSIS — E11.9 CONTROLLED TYPE 2 DIABETES MELLITUS WITHOUT COMPLICATION, WITHOUT LONG-TERM CURRENT USE OF INSULIN (HCC): ICD-10-CM

## 2025-01-21 PROCEDURE — 99214 OFFICE O/P EST MOD 30 MIN: CPT | Performed by: INTERNAL MEDICINE

## 2025-01-21 PROCEDURE — 3079F DIAST BP 80-89 MM HG: CPT | Performed by: INTERNAL MEDICINE

## 2025-01-21 PROCEDURE — 3077F SYST BP >= 140 MM HG: CPT | Performed by: INTERNAL MEDICINE

## 2025-01-21 ASSESSMENT — PATIENT HEALTH QUESTIONNAIRE - PHQ9
SUM OF ALL RESPONSES TO PHQ9 QUESTIONS 1 & 2: 0
1. LITTLE INTEREST OR PLEASURE IN DOING THINGS: NOT AT ALL
SUM OF ALL RESPONSES TO PHQ QUESTIONS 1-9: 0
2. FEELING DOWN, DEPRESSED OR HOPELESS: NOT AT ALL
SUM OF ALL RESPONSES TO PHQ QUESTIONS 1-9: 0

## 2025-01-21 NOTE — PROGRESS NOTES
[place]: Ashley  [phone]    HPI:  Mr. Vu is a 55 yo M with essential hypertension, type 2 diabetes, mixed hyperlipidemia    On his last visit, due to shortness of breath and abnormal stress test, proceeded with cardiac cath in December.  This demonstrated no significant obstructive coronary artery disease.  There was some plaquing of the RCA, but this was not significant by pressure wire.  Since then, he has not had any chest pain.  He does have some shortness of breath with exertion, but this has been stable.  No significant palpitations.  No recurrent syncope.  He is compensated on exam with clear lungs and no lower extremity edema.  Soc hx. No tobacco. 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 Shortness of breath.  Subsequent cardiac cath demonstrated no significant obstructive coronary artery disease.  Reassured him.  2. Syncope.  He did have nonsustained ventricular tachycardia noted.  He is already on a beta-blocker.  As noted above, his cardiac cath demonstrated no significant obstructive CAD.  He is going to see Dr. Mckay this coming month to discuss whether or not an ablation would be beneficial.  3. Essential hypertension.  Blood pressure controlled.  4. Mixed hyperlipidemia.  Tolerating statin.  5. Type 2 diabetes.  6. Renal insufficiency.  Creatinine has been elevated and gave him contact information for Nephrology.        He  has a past medical history of Abnormal liver enzymes, Anxiety, Diabetes mellitus (HCC), GERD (gastroesophageal reflux disease), Hypertension, Kidney stone, Psychiatric disorder, and Vitamin B12 deficiency.    All other systems negative except as above.     PE  Vitals:    01/21/25 1516   BP: (!) 142/82   Site: Right Upper Arm   Position: Sitting   Cuff Size: Medium Adult   Pulse: 71   SpO2: 98%   Weight: 97.1 kg (214 lb)   Height: 1.854 m (6' 1\")      Body mass index is 28.23 kg/m².  General appearance - alert,

## 2025-01-21 NOTE — PATIENT INSTRUCTIONS
Dr. Duglas Chicas    [facility]   [address] [address] [phone]  Fax: [phone]

## 2025-01-28 ENCOUNTER — OFFICE VISIT (OUTPATIENT)
Age: 55
End: 2025-01-28
Payer: COMMERCIAL

## 2025-01-28 VITALS
HEIGHT: 73 IN | BODY MASS INDEX: 28.1 KG/M2 | DIASTOLIC BLOOD PRESSURE: 82 MMHG | HEART RATE: 93 BPM | WEIGHT: 212 LBS | SYSTOLIC BLOOD PRESSURE: 134 MMHG | OXYGEN SATURATION: 99 %

## 2025-01-28 DIAGNOSIS — I47.29 NSVT (NONSUSTAINED VENTRICULAR TACHYCARDIA) (HCC): ICD-10-CM

## 2025-01-28 DIAGNOSIS — E78.2 MIXED HYPERLIPIDEMIA: ICD-10-CM

## 2025-01-28 DIAGNOSIS — R55 SYNCOPE, UNSPECIFIED SYNCOPE TYPE: Primary | ICD-10-CM

## 2025-01-28 DIAGNOSIS — I10 BENIGN ESSENTIAL HTN: ICD-10-CM

## 2025-01-28 PROCEDURE — 3079F DIAST BP 80-89 MM HG: CPT | Performed by: INTERNAL MEDICINE

## 2025-01-28 PROCEDURE — 93000 ELECTROCARDIOGRAM COMPLETE: CPT | Performed by: INTERNAL MEDICINE

## 2025-01-28 PROCEDURE — 99204 OFFICE O/P NEW MOD 45 MIN: CPT | Performed by: INTERNAL MEDICINE

## 2025-01-28 PROCEDURE — G2211 COMPLEX E/M VISIT ADD ON: HCPCS | Performed by: INTERNAL MEDICINE

## 2025-01-28 PROCEDURE — 3075F SYST BP GE 130 - 139MM HG: CPT | Performed by: INTERNAL MEDICINE

## 2025-01-28 ASSESSMENT — PATIENT HEALTH QUESTIONNAIRE - PHQ9
SUM OF ALL RESPONSES TO PHQ QUESTIONS 1-9: 0
1. LITTLE INTEREST OR PLEASURE IN DOING THINGS: NOT AT ALL
SUM OF ALL RESPONSES TO PHQ9 QUESTIONS 1 & 2: 0
SUM OF ALL RESPONSES TO PHQ QUESTIONS 1-9: 0
2. FEELING DOWN, DEPRESSED OR HOPELESS: NOT AT ALL

## 2025-01-28 NOTE — PATIENT INSTRUCTIONS
Please let us know if you want to proceed with the implantable loop recorder  Follow-up with EP as needed      Learning About Implantable Heart Monitors  What is an implantable heart monitor?     An implantable heart monitor is a small device placed under the skin of your chest. It records the electrical signals from your heart. A monitor is used to look for irregular heartbeats. It can help your doctor find out what is causing your fainting, lightheadedness, or other symptoms. It also can help your doctor check to see if treatment for an irregular heartbeat is working.  The monitor may be placed near the middle of your chest. The monitor may be about the size of a paper clip.  These monitors are used if an irregular heart rhythm or your symptoms don't happen very often. They also help your doctor monitor your heart for a long time.  How is the monitor put in place?  The monitor is put in during a short surgery. You will get medicine to numb the area of your chest where the monitor will be put in. You will be awake during the surgery, but you shouldn't feel any pain.  Your doctor will make a small cut and place the monitor under your skin. Then he or she will close the cut with special tape or glue or with stitches that will dissolve. Then the doctor will place a bandage over the cut.  The procedure will take about half an hour. You probably will be able to go home soon after it's done.  You may have some minor pain where the cut was made. You will get instructions from your doctor on how to care for it at home.  When your doctor says it's safe, you should be able to get back to your normal activities.  How is the monitor used?  Your monitor may start recording on its own when it detects an abnormal heartbeat. Or you might use a handheld device to start the monitor when you have symptoms. Your doctor will explain which type of monitor you have and what you need to do.  Your doctor will tell you how often he or she

## 2025-01-28 NOTE — PROGRESS NOTES
1. Have you been to the ER, urgent care clinic since your last visit?  Hospitalized since your last visit?No    2. Have you seen or consulted any other health care providers outside of the [facility] since your last visit?  Include any pap smears or colon screening. No

## 2025-01-28 NOTE — PROGRESS NOTES
Cardiac Electrophysiology OFFICE Consultation Note       Assessment/Plan:   1. Syncope, unspecified syncope type  -     EKG 12 Lead  2. NSVT (nonsustained ventricular tachycardia) (HCC)  -     EKG 12 Lead  3. Benign essential HTN  -     EKG 12 Lead  4. Mixed hyperlipidemia  -     EKG 12 Lead       Primary Cardiologist: Dion     Nonsustained ventricular tachycardia:  -He reports a syncopal episode in 10/2024.  He denies any warning prior to event, was unwitnessed.  He believes he may have had LOC x >20 minutes, reports loss of bowel/bladder control with event.   -Monitor in 10/2024 showed sinus tach with NSVT x 10 seconds; unable to rule out AF/AFL with aberrancy due to artifact.  -Nuclear stress test in 11/2024 showed LVEF 60% with moderate severity medium to large size reversible perfusion defect in inferior & inferolat segment(s).  -LHC in 12/2024 showed 50% prox RCA lesion.  -Echo in 12/2024 showed LVEF 55-60% with trace MR/TR.  -Continue Toprol XL.  - no indication for EPS/ablation at this time  - given unexplained syncope without prodrome, I explained the role of ILR implantation  - benefits, implications of loop implant was explained to patient, he can call us if he wants to proceed   - FU with EP as needed    CAD:  -See above; LHC in 12/2024 showed moderate RCA lesion.  -No angina.  -Continue statin & ASA.    HTN:  -BP controlled.  -Continue Toprol XL.  -Recommend reduced sodium diet & routine exercise.        Subjective:       Vinod Vu is a 54 y.o. patient who is seen for evaluation/management of NSVT and syncope.    Currently on Toprol XL.  He reports a syncopal episode in 10/2024.  He denies any warning prior to event, was unwitnessed.  He believes he may have had LOC x >20 minutes, reports loss of bowel/bladder control with event.  No chest pain, palpitations, SOB, or edema.    ECG today shows NSR 87 bpm.    Echo in 12/2024 showed LVEF 55-60% with trace MR/TR.    LHC in 12/2024 showed 50% prox

## 2025-01-30 DIAGNOSIS — F43.9 STRESS: ICD-10-CM

## 2025-01-31 RX ORDER — ALPRAZOLAM 1 MG/1
TABLET ORAL
Qty: 90 TABLET | Refills: 0 | Status: SHIPPED | OUTPATIENT
Start: 2025-01-31 | End: 2025-05-01

## 2025-02-05 ENCOUNTER — TELEPHONE (OUTPATIENT)
Age: 55
End: 2025-02-05

## 2025-02-05 DIAGNOSIS — R79.89 ELEVATED SERUM CREATININE: Primary | ICD-10-CM

## 2025-02-05 NOTE — TELEPHONE ENCOUNTER
Telephone call made to patient. Two patient identifiers verified. Gave information for kidney doctor.   Dr. Duglas Chicas    [facility]   [address],  [facility] [place] [address] [phone]  Fax: [phone]

## 2025-02-05 NOTE — TELEPHONE ENCOUNTER
Patient requested the kidney specialist Dr. Ashley recommended him to     Requested a call back with that information    If regarding creatine levels     Contact Information  [phone] (Mobile)   [phone] (Home Phone)

## 2025-02-05 NOTE — TELEPHONE ENCOUNTER
Telephone call made to patient. Two patient identifiers verified.   Let the patient know the referral was sent along with all requested information. Confirmation fax receipt received.

## 2025-02-05 NOTE — TELEPHONE ENCOUNTER
Patient is calling because the Nephrologist that  has referred him to needs the patient's demographic sheet, 3 most recent labs, 3 most recent office notes.    [facility]  [phone] fax     Att:   [address] [place], [place]      [phone] patient

## 2025-02-20 ENCOUNTER — TELEPHONE (OUTPATIENT)
Age: 55
End: 2025-02-20

## 2025-02-20 ENCOUNTER — OFFICE VISIT (OUTPATIENT)
Age: 55
End: 2025-02-20
Payer: COMMERCIAL

## 2025-02-20 VITALS
RESPIRATION RATE: 16 BRPM | WEIGHT: 211.4 LBS | OXYGEN SATURATION: 99 % | HEART RATE: 73 BPM | TEMPERATURE: 97.9 F | SYSTOLIC BLOOD PRESSURE: 135 MMHG | HEIGHT: 73 IN | DIASTOLIC BLOOD PRESSURE: 87 MMHG | BODY MASS INDEX: 28.02 KG/M2

## 2025-02-20 DIAGNOSIS — M65.332 TRIGGER MIDDLE FINGER OF LEFT HAND: ICD-10-CM

## 2025-02-20 DIAGNOSIS — R60.0 LOCALIZED EDEMA: Primary | ICD-10-CM

## 2025-02-20 DIAGNOSIS — Z12.5 PROSTATE CANCER SCREENING: ICD-10-CM

## 2025-02-20 LAB
ALBUMIN SERPL-MCNC: 3.7 G/DL (ref 3.5–5)
ALBUMIN/GLOB SERPL: 1 (ref 1.1–2.2)
ALP SERPL-CCNC: 67 U/L (ref 45–117)
ALT SERPL-CCNC: 41 U/L (ref 12–78)
ANION GAP SERPL CALC-SCNC: 6 MMOL/L (ref 2–12)
APPEARANCE UR: CLEAR
AST SERPL-CCNC: 38 U/L (ref 15–37)
BACTERIA URNS QL MICRO: ABNORMAL /HPF
BILIRUB SERPL-MCNC: 0.9 MG/DL (ref 0.2–1)
BILIRUB UR QL: NEGATIVE
BUN SERPL-MCNC: 16 MG/DL (ref 6–20)
BUN/CREAT SERPL: 12 (ref 12–20)
CALCIUM SERPL-MCNC: 9.3 MG/DL (ref 8.5–10.1)
CHLORIDE SERPL-SCNC: 103 MMOL/L (ref 97–108)
CO2 SERPL-SCNC: 29 MMOL/L (ref 21–32)
COLOR UR: ABNORMAL
CREAT SERPL-MCNC: 1.34 MG/DL (ref 0.7–1.3)
EPITH CASTS URNS QL MICRO: ABNORMAL /LPF
GLOBULIN SER CALC-MCNC: 3.6 G/DL (ref 2–4)
GLUCOSE SERPL-MCNC: 135 MG/DL (ref 65–100)
GLUCOSE UR STRIP.AUTO-MCNC: NEGATIVE MG/DL
HGB UR QL STRIP: NEGATIVE
HYALINE CASTS URNS QL MICRO: ABNORMAL /LPF (ref 0–5)
KETONES UR QL STRIP.AUTO: NEGATIVE MG/DL
LEUKOCYTE ESTERASE UR QL STRIP.AUTO: ABNORMAL
NITRITE UR QL STRIP.AUTO: NEGATIVE
PH UR STRIP: 6 (ref 5–8)
POTASSIUM SERPL-SCNC: 4 MMOL/L (ref 3.5–5.1)
PROT SERPL-MCNC: 7.3 G/DL (ref 6.4–8.2)
PROT UR STRIP-MCNC: NEGATIVE MG/DL
PSA SERPL-MCNC: 2.5 NG/ML (ref 0.01–4)
RBC #/AREA URNS HPF: ABNORMAL /HPF (ref 0–5)
SODIUM SERPL-SCNC: 138 MMOL/L (ref 136–145)
SP GR UR REFRACTOMETRY: 1.02 (ref 1–1.03)
SPECIMEN HOLD: NORMAL
UROBILINOGEN UR QL STRIP.AUTO: 0.2 EU/DL (ref 0.2–1)
WBC URNS QL MICRO: ABNORMAL /HPF (ref 0–4)

## 2025-02-20 PROCEDURE — 3079F DIAST BP 80-89 MM HG: CPT | Performed by: FAMILY MEDICINE

## 2025-02-20 PROCEDURE — 99213 OFFICE O/P EST LOW 20 MIN: CPT | Performed by: FAMILY MEDICINE

## 2025-02-20 PROCEDURE — 3075F SYST BP GE 130 - 139MM HG: CPT | Performed by: FAMILY MEDICINE

## 2025-02-20 RX ORDER — FUROSEMIDE 40 MG/1
40 TABLET ORAL DAILY
Qty: 30 TABLET | Refills: 0 | Status: SHIPPED | OUTPATIENT
Start: 2025-02-20

## 2025-02-20 SDOH — ECONOMIC STABILITY: FOOD INSECURITY: WITHIN THE PAST 12 MONTHS, THE FOOD YOU BOUGHT JUST DIDN'T LAST AND YOU DIDN'T HAVE MONEY TO GET MORE.: NEVER TRUE

## 2025-02-20 SDOH — ECONOMIC STABILITY: FOOD INSECURITY: WITHIN THE PAST 12 MONTHS, YOU WORRIED THAT YOUR FOOD WOULD RUN OUT BEFORE YOU GOT MONEY TO BUY MORE.: NEVER TRUE

## 2025-02-20 NOTE — TELEPHONE ENCOUNTER
Pt p#[phone], pt states his feet and lower legs are swollen and numb, pt states since he's been on Metoporol its increased in pain as well,, he is requesting to speak to the nurse or dr Haddad for advice (he has appt on 3-6 )

## 2025-02-20 NOTE — TELEPHONE ENCOUNTER
Telephone call made to patient. Two patient identifiers verified.   The patient is struggling with swollen ankles and feet. He wears compression socks. I told him to limit sodium intake and elevate his feet at rest. He said it got worse when he started taking metoprolol, but that's been very effective for his heart rate. He's waiting on a call to see the kidney doctor (they told him he is 60th in line) and will reach out to them again. Patient wasn't sure if Dr. Ashley would prescribe him a diuretic.

## 2025-02-20 NOTE — TELEPHONE ENCOUNTER
Pts lower legs and feet are gong numb and staying numb. Can you pls call pt on [phone]. thanks

## 2025-02-20 NOTE — PROGRESS NOTES
Chief Complaint   Patient presents with    Follow-up       \"Have you been to the ER, urgent care clinic since your last visit?  Hospitalized since your last visit?\"    NO    “Have you seen or consulted any other health care providers outside of [facility] since your last visit?”      25 -CARDIOLOGY - DR. FELECIA MAYORGA    UPCOMING APPT WITH NEPHROLOGY             Click Here for Release of Records Request       There were no vitals filed for this visit.   Health Maintenance Due   Topic Date Due    Diabetic retinal exam  Never done    Hepatitis B vaccine (1 of 3 - 19+ 3-dose series) Never done    Diabetic foot exam  2020    Pneumococcal 50+ years Vaccine (2 of 2 - PCV) 2021    Shingles vaccine (2 of 2) 2024        The patient, Vinod Vu, identity was verified by name and .

## 2025-02-20 NOTE — PROGRESS NOTES
Vinod Vu (:  1970) is a 54 y.o. male,Established patient, here for evaluation of the following chief complaint(s):  Follow-up         Assessment & Plan  Localized edema       Orders:    Comprehensive Metabolic Panel; Future    Urinalysis with Microscopic; Future  agree with Dr. Ashley that can trylasix prn edema. Edema is trace on exam.   Prostate cancer screening       Orders:    PSA, Diagnostic; Future      No follow-ups on file.       Subjective   HPI has been having swelling in both legs for the last few months, slowly getting worse. No co dyspnea, chest pains, dark urine. Some weak stream. No further palpitations since starting on metoprolol. Has some stopping and starting when voids. Dr. Ashley called him in lasix 40 mg daily prn earlier today and advised salt restriction. Pt kvng had some problems with voiding, intermittent weak stream. Is on flomax.     Review of Systems       Objective   Physical Exam  Cardiovascular:      Rate and Rhythm: Normal rate and regular rhythm.      Heart sounds: Normal heart sounds. No murmur heard.  Pulmonary:      Effort: Pulmonary effort is normal.      Breath sounds: Normal breath sounds.   Abdominal:      General: Abdomen is flat. Bowel sounds are normal.      Palpations: Abdomen is soft.   Musculoskeletal:      Right lower leg: No edema.      Left lower leg: No edema.      Comments: Tr edema on exam.                   An electronic signature was used to authenticate this note.    --Rafy Haddad MD

## 2025-02-20 NOTE — TELEPHONE ENCOUNTER
Gracie Ashley MD   to Me       2/20/25 11:05 AM  His echo had normal LV function last yr and his cardiac cath without significant CAD. Consistent with his swelling being non cardiac. We can give script for lasix 40 mg qd to try for 1 month, but given his h/o kidney dysfunction, further adjustments should be made by his nephrologist. Agree minimizing salt and elevating legs. Thx    Telephone call made to patient. Two patient identifiers verified.   Went over furosemide. Verified understanding.     Requested Prescriptions     Signed Prescriptions Disp Refills    furosemide (LASIX) 40 MG tablet 30 tablet 0     Sig: Take 1 tablet by mouth daily     Authorizing Provider: GRACIE ASHLEY     Ordering User: KATHRYN HERNANDEZ MD    Future Appointments   Date Time Provider Department Center   3/6/2025  3:00 PM Rafy Haddad MD [facility] DEP   8/4/2025  3:20 PM Gracie Ashley MD [facility]

## 2025-02-25 ENCOUNTER — TELEPHONE (OUTPATIENT)
Age: 55
End: 2025-02-25

## 2025-02-25 RX ORDER — CIPROFLOXACIN 500 MG/1
500 TABLET, FILM COATED ORAL 2 TIMES DAILY
Qty: 20 TABLET | Refills: 0 | Status: SHIPPED | OUTPATIENT
Start: 2025-02-25 | End: 2025-03-07

## 2025-02-26 DIAGNOSIS — F43.9 STRESS: ICD-10-CM

## 2025-02-26 RX ORDER — ALPRAZOLAM 1 MG/1
TABLET ORAL
Qty: 90 TABLET | Refills: 2 | Status: SHIPPED | OUTPATIENT
Start: 2025-02-26 | End: 2025-05-27

## 2025-02-26 NOTE — TELEPHONE ENCOUNTER
Last appointment: 2/20/25  Next appointment: 3/6/25  Previous refill encounter(s): 1/31/25 #90    Requested Prescriptions     Pending Prescriptions Disp Refills    ALPRAZolam (XANAX) 1 MG tablet [Pharmacy Med Name: ALPRAZOLAM 1MG TABLETS] 90 tablet 2     Sig: TAKE 1 TABLET BY MOUTH THREE TIMES DAILY AS NEEDED FOR SLEEP OR ANXIETY. MAX DAILY AMOUNT: 3 MG         For Pharmacy Admin Tracking Only    Program: Medication Refill  CPA in place:    Recommendation Provided To:   Intervention Detail: New Rx: 1, reason: Patient Preference  Intervention Accepted By:   Gap Closed?:    Time Spent (min): 5

## 2025-04-16 DIAGNOSIS — G62.9 NEUROPATHY: Primary | ICD-10-CM

## 2025-04-16 RX ORDER — GABAPENTIN 300 MG/1
300 CAPSULE ORAL 2 TIMES DAILY
Qty: 60 CAPSULE | Refills: 0 | Status: SHIPPED | OUTPATIENT
Start: 2025-04-16 | End: 2025-05-16

## 2025-04-16 NOTE — TELEPHONE ENCOUNTER
Dr. Haddad made aware and gave verbal order for Gabapentin 300mg 1 tab po BID. VORB. Patient contacted and made aware.

## 2025-04-16 NOTE — TELEPHONE ENCOUNTER
Pt is wanting to know if he could get back on Gabapentin for his nerve pain. He has been taking an old prescription from 2021 and it has been helping. He can be reached at [phone].

## 2025-05-28 DIAGNOSIS — F43.9 STRESS: Primary | ICD-10-CM

## 2025-05-28 DIAGNOSIS — E53.1 VITAMIN B6 DEFICIENCY: ICD-10-CM

## 2025-05-28 NOTE — TELEPHONE ENCOUNTER
Last appointment: 2/20/25  Next appointment: none  Previous refill encounter(s): 11/12/24 Toprol #90 with 1 refill, 5/7/24 Vitamin B-6, 2/26/25 Xanax #90 with 2 refills    Requested Prescriptions     Pending Prescriptions Disp Refills    metoprolol succinate (TOPROL XL) 50 MG extended release tablet 90 tablet 3     Sig: Take 1 tablet by mouth daily    vitamin B-6 (PYRIDOXINE) 50 MG tablet 90 tablet 3     Sig: Take 1 tablet by mouth daily    ALPRAZolam (XANAX) 1 MG tablet 90 tablet 2     Sig: Take 1 tablet by mouth 3 times daily as needed for Sleep or Anxiety for up to 90 days. Max Daily Amount: 3 mg         For Pharmacy Admin Tracking Only    Program: Medication Refill  CPA in place:    Recommendation Provided To:   Intervention Detail: New Rx: 3, reason: Patient Preference  Intervention Accepted By:   Gap Closed?:    Time Spent (min): 5

## 2025-05-29 RX ORDER — LANOLIN ALCOHOL/MO/W.PET/CERES
50 CREAM (GRAM) TOPICAL DAILY
Qty: 90 TABLET | Refills: 3 | Status: SHIPPED | OUTPATIENT
Start: 2025-05-29

## 2025-05-29 RX ORDER — ALPRAZOLAM 1 MG/1
1 TABLET ORAL 3 TIMES DAILY PRN
Qty: 90 TABLET | Refills: 2 | Status: SHIPPED | OUTPATIENT
Start: 2025-05-29 | End: 2025-08-27

## 2025-05-29 RX ORDER — METOPROLOL SUCCINATE 50 MG/1
50 TABLET, EXTENDED RELEASE ORAL DAILY
Qty: 90 TABLET | Refills: 3 | Status: SHIPPED | OUTPATIENT
Start: 2025-05-29

## 2025-07-01 NOTE — TELEPHONE ENCOUNTER
Last appointment: 2/20/25  Next appointment: none  Previous refill encounter(s): 12/6/24 #10    Requested Prescriptions     Pending Prescriptions Disp Refills    predniSONE (DELTASONE) 20 MG tablet 10 tablet 0     Sig: Take 1 tablet by mouth 2 times daily for 5 days         For Pharmacy Admin Tracking Only    Program: Medication Refill  CPA in place:    Recommendation Provided To:   Intervention Detail: New Rx: 1, reason: Patient Preference  Intervention Accepted By:   Gap Closed?:    Time Spent (min): 5

## 2025-07-07 RX ORDER — PREDNISONE 20 MG/1
20 TABLET ORAL 2 TIMES DAILY
Qty: 10 TABLET | Refills: 0 | Status: SHIPPED | OUTPATIENT
Start: 2025-07-07 | End: 2025-07-12

## 2025-08-27 DIAGNOSIS — F43.9 STRESS: ICD-10-CM

## 2025-08-28 RX ORDER — ALPRAZOLAM 1 MG/1
TABLET ORAL
Qty: 90 TABLET | Refills: 0 | Status: SHIPPED | OUTPATIENT
Start: 2025-08-28 | End: 2025-11-26

## 2025-09-02 DIAGNOSIS — E55.9 VITAMIN D DEFICIENCY: ICD-10-CM

## 2025-09-02 RX ORDER — ERGOCALCIFEROL 1.25 MG/1
50000 CAPSULE, LIQUID FILLED ORAL WEEKLY
Qty: 12 CAPSULE | Refills: 3 | OUTPATIENT
Start: 2025-09-02

## (undated) DEVICE — CATHETER DIAG 5FR L100CM LUMN ID0.047IN JR4 CRV 0 SIDE H

## (undated) DEVICE — KIT MFLD ISOLATN NACL CNTRST PRT TBNG SPIK W/ PRSS TRNSDUC

## (undated) DEVICE — TR BAND RADIAL ARTERY COMPRESSION DEVICE: Brand: TR BAND

## (undated) DEVICE — SPLINT WR VELC FOAM NEUT POS DISP FOR RAD ART ACC SFT STRP

## (undated) DEVICE — Device

## (undated) DEVICE — CATHETER GUID 6FR DIA0.071IN SHFT NYL STD L JR 4 CRV ENH

## (undated) DEVICE — PADPRO DEFIBRILLATION/PACING/CARDIOVERSION/MONITORING ELECTRODES, ADULT/CHILD GREATER THAN 10 KG RADIOTRANSPARENT ELECTRODE, PHYSIO-CONTROL QUIK-COMBO (M) 60" (152 CM): Brand: PADPRO

## (undated) DEVICE — COPILOT BLEEDBACK CONTROL VALVE: Brand: COPILOT

## (undated) DEVICE — TUBING PRSS MON L6IN PVC M FEM CONN

## (undated) DEVICE — KIT MED IMAG CNTRST AGNT W/ IOPAMIDOL REUSE

## (undated) DEVICE — CATHETER DIAG 5FR L100CM LUMN ID0.047IN JL3.5 CRV 0 SIDE H

## (undated) DEVICE — WASTE KIT - ST MARY: Brand: MEDLINE INDUSTRIES, INC.

## (undated) DEVICE — PROVE COVER: Brand: UNBRANDED

## (undated) DEVICE — Device: Brand: OMNIWIRE PRESSURE GUIDE WIRE

## (undated) DEVICE — GUIDEWIRE VASC J 3 MM 0.035 INX210 CM FIX COR INQWIRE

## (undated) DEVICE — SPECIAL PROCEDURE DRAPE 32" X 34": Brand: SPECIAL PROCEDURE DRAPE

## (undated) DEVICE — KIT AT-X65 ANGIOTOUCH HAND CONTROLLER

## (undated) DEVICE — ANGIOGRAPHY KIT

## (undated) DEVICE — KIT HND CTRL 3 W STPCOCK ROT END 54IN PREM HI PRSS TBNG AT

## (undated) DEVICE — GLIDESHEATH SLENDER ACCESS KIT: Brand: GLIDESHEATH SLENDER

## (undated) DEVICE — VALVE ANGIO ID0.11IN HEMSTAT MTL GUID WIRE INSRT TOOL AND